# Patient Record
Sex: FEMALE | Race: WHITE | NOT HISPANIC OR LATINO | Employment: FULL TIME | URBAN - METROPOLITAN AREA
[De-identification: names, ages, dates, MRNs, and addresses within clinical notes are randomized per-mention and may not be internally consistent; named-entity substitution may affect disease eponyms.]

---

## 2017-02-27 ENCOUNTER — ALLSCRIPTS OFFICE VISIT (OUTPATIENT)
Dept: OTHER | Facility: OTHER | Age: 47
End: 2017-02-27

## 2017-06-05 ENCOUNTER — ALLSCRIPTS OFFICE VISIT (OUTPATIENT)
Dept: OTHER | Facility: OTHER | Age: 47
End: 2017-06-05

## 2017-06-05 ENCOUNTER — LAB REQUISITION (OUTPATIENT)
Dept: LAB | Facility: HOSPITAL | Age: 47
End: 2017-06-05
Payer: COMMERCIAL

## 2017-06-05 ENCOUNTER — TRANSCRIBE ORDERS (OUTPATIENT)
Dept: ADMINISTRATIVE | Facility: HOSPITAL | Age: 47
End: 2017-06-05

## 2017-06-05 ENCOUNTER — HOSPITAL ENCOUNTER (OUTPATIENT)
Dept: RADIOLOGY | Facility: MEDICAL CENTER | Age: 47
Discharge: HOME | End: 2017-06-05
Payer: COMMERCIAL

## 2017-06-05 DIAGNOSIS — M54.6 PAIN IN THORACIC SPINE: ICD-10-CM

## 2017-06-05 DIAGNOSIS — R42 DIZZINESS AND GIDDINESS: ICD-10-CM

## 2017-06-05 DIAGNOSIS — M54.50 LOW BACK PAIN: ICD-10-CM

## 2017-06-05 DIAGNOSIS — N92.0 EXCESSIVE AND FREQUENT MENSTRUATION WITH REGULAR CYCLE: ICD-10-CM

## 2017-06-05 DIAGNOSIS — R23.2 FLUSHING: ICD-10-CM

## 2017-06-05 DIAGNOSIS — R10.9 ABDOMINAL PAIN: ICD-10-CM

## 2017-06-05 LAB
ALBUMIN SERPL BCP-MCNC: 3.6 G/DL (ref 3.5–5)
ALP SERPL-CCNC: 62 U/L (ref 46–116)
ALT SERPL W P-5'-P-CCNC: 24 U/L (ref 12–78)
ANION GAP SERPL CALCULATED.3IONS-SCNC: 8 MMOL/L (ref 4–13)
AST SERPL W P-5'-P-CCNC: 18 U/L (ref 5–45)
BASOPHILS # BLD AUTO: 0.03 THOUSANDS/ΜL (ref 0–0.1)
BASOPHILS NFR BLD AUTO: 0 % (ref 0–1)
BILIRUB SERPL-MCNC: 0.51 MG/DL (ref 0.2–1)
BILIRUB UR QL STRIP: NEGATIVE
BUN SERPL-MCNC: 15 MG/DL (ref 5–25)
CALCIUM SERPL-MCNC: 10.1 MG/DL (ref 8.3–10.1)
CHLORIDE SERPL-SCNC: 103 MMOL/L (ref 100–108)
CLARITY UR: CLEAR
CO2 SERPL-SCNC: 29 MMOL/L (ref 21–32)
COLOR UR: YELLOW
CREAT SERPL-MCNC: 0.97 MG/DL (ref 0.6–1.3)
EOSINOPHIL # BLD AUTO: 0.2 THOUSAND/ΜL (ref 0–0.61)
EOSINOPHIL NFR BLD AUTO: 2 % (ref 0–6)
ERYTHROCYTE [DISTWIDTH] IN BLOOD BY AUTOMATED COUNT: 14 % (ref 11.6–15.1)
GFR SERPL CREATININE-BSD FRML MDRD: >60 ML/MIN/1.73SQ M
GLUCOSE P FAST SERPL-MCNC: 90 MG/DL (ref 65–99)
GLUCOSE UR STRIP-MCNC: NEGATIVE MG/DL
HCT VFR BLD AUTO: 42 % (ref 34.8–46.1)
HGB BLD-MCNC: 13.5 G/DL (ref 11.5–15.4)
HGB UR QL STRIP.AUTO: NEGATIVE
KETONES UR STRIP-MCNC: NEGATIVE MG/DL
LEUKOCYTE ESTERASE UR QL STRIP: NEGATIVE
LYMPHOCYTES # BLD AUTO: 2.64 THOUSANDS/ΜL (ref 0.6–4.47)
LYMPHOCYTES NFR BLD AUTO: 29 % (ref 14–44)
MCH RBC QN AUTO: 30.7 PG (ref 26.8–34.3)
MCHC RBC AUTO-ENTMCNC: 32.1 G/DL (ref 31.4–37.4)
MCV RBC AUTO: 96 FL (ref 82–98)
MONOCYTES # BLD AUTO: 0.83 THOUSAND/ΜL (ref 0.17–1.22)
MONOCYTES NFR BLD AUTO: 9 % (ref 4–12)
NEUTROPHILS # BLD AUTO: 5.39 THOUSANDS/ΜL (ref 1.85–7.62)
NEUTS SEG NFR BLD AUTO: 60 % (ref 43–75)
NITRITE UR QL STRIP: NEGATIVE
NRBC BLD AUTO-RTO: 0 /100 WBCS
PH UR STRIP.AUTO: 6.5 [PH] (ref 4.5–8)
PLATELET # BLD AUTO: 359 THOUSANDS/UL (ref 149–390)
PMV BLD AUTO: 10 FL (ref 8.9–12.7)
POTASSIUM SERPL-SCNC: 4.1 MMOL/L (ref 3.5–5.3)
PROT SERPL-MCNC: 7.4 G/DL (ref 6.4–8.2)
PROT UR STRIP-MCNC: NEGATIVE MG/DL
RBC # BLD AUTO: 4.4 MILLION/UL (ref 3.81–5.12)
SODIUM SERPL-SCNC: 140 MMOL/L (ref 136–145)
SP GR UR STRIP.AUTO: 1.01 (ref 1–1.03)
T4 FREE SERPL-MCNC: 1.08 NG/DL (ref 0.76–1.46)
TSH SERPL DL<=0.05 MIU/L-ACNC: 2.14 UIU/ML (ref 0.36–3.74)
UROBILINOGEN UR QL STRIP.AUTO: 0.2 E.U./DL
WBC # BLD AUTO: 9.11 THOUSAND/UL (ref 4.31–10.16)

## 2017-06-05 PROCEDURE — 84439 ASSAY OF FREE THYROXINE: CPT | Performed by: PHYSICIAN ASSISTANT

## 2017-06-05 PROCEDURE — 72080 X-RAY EXAM THORACOLMB 2/> VW: CPT

## 2017-06-05 PROCEDURE — 85025 COMPLETE CBC W/AUTO DIFF WBC: CPT | Performed by: PHYSICIAN ASSISTANT

## 2017-06-05 PROCEDURE — 80053 COMPREHEN METABOLIC PANEL: CPT | Performed by: PHYSICIAN ASSISTANT

## 2017-06-05 PROCEDURE — 81003 URINALYSIS AUTO W/O SCOPE: CPT | Performed by: PHYSICIAN ASSISTANT

## 2017-06-05 PROCEDURE — 84443 ASSAY THYROID STIM HORMONE: CPT | Performed by: PHYSICIAN ASSISTANT

## 2017-06-09 ENCOUNTER — GENERIC CONVERSION - ENCOUNTER (OUTPATIENT)
Dept: OTHER | Facility: OTHER | Age: 47
End: 2017-06-09

## 2017-09-25 ENCOUNTER — GENERIC CONVERSION - ENCOUNTER (OUTPATIENT)
Dept: OTHER | Facility: OTHER | Age: 47
End: 2017-09-25

## 2017-09-25 DIAGNOSIS — N85.2 HYPERTROPHY OF UTERUS: ICD-10-CM

## 2017-09-25 DIAGNOSIS — Z12.31 ENCOUNTER FOR SCREENING MAMMOGRAM FOR MALIGNANT NEOPLASM OF BREAST: ICD-10-CM

## 2017-09-29 ENCOUNTER — HOSPITAL ENCOUNTER (OUTPATIENT)
Dept: ULTRASOUND IMAGING | Facility: MEDICAL CENTER | Age: 47
Discharge: HOME | End: 2017-09-29
Payer: COMMERCIAL

## 2017-09-29 DIAGNOSIS — N85.2 HYPERTROPHY OF UTERUS: ICD-10-CM

## 2017-09-29 PROCEDURE — 76830 TRANSVAGINAL US NON-OB: CPT

## 2017-09-29 PROCEDURE — 76856 US EXAM PELVIC COMPLETE: CPT

## 2017-10-03 ENCOUNTER — GENERIC CONVERSION - ENCOUNTER (OUTPATIENT)
Dept: OTHER | Facility: OTHER | Age: 47
End: 2017-10-03

## 2017-10-17 ENCOUNTER — HOSPITAL ENCOUNTER (OUTPATIENT)
Dept: MAMMOGRAPHY | Facility: MEDICAL CENTER | Age: 47
Discharge: HOME | End: 2017-10-17
Payer: COMMERCIAL

## 2017-10-17 DIAGNOSIS — Z12.31 ENCOUNTER FOR SCREENING MAMMOGRAM FOR MALIGNANT NEOPLASM OF BREAST: ICD-10-CM

## 2017-10-17 PROCEDURE — G0202 SCR MAMMO BI INCL CAD: HCPCS

## 2017-10-17 PROCEDURE — 77063 BREAST TOMOSYNTHESIS BI: CPT

## 2017-11-03 ENCOUNTER — ALLSCRIPTS OFFICE VISIT (OUTPATIENT)
Dept: OTHER | Facility: OTHER | Age: 47
End: 2017-11-03

## 2017-11-03 DIAGNOSIS — M54.50 LOW BACK PAIN: ICD-10-CM

## 2017-11-04 NOTE — PROGRESS NOTES
Assessment  1  Lumbar pain with radiation down right leg (724 2) (M54 5)    Plan  Lumbar pain with radiation down right leg    · Cyclobenzaprine HCl - 10 MG Oral Tablet; TAKE 1 TABLET Bedtime   · Meloxicam 15 MG Oral Tablet; Take 1 tablet daily   · *1 - [facility]  *  Status: Active  Requested  for: [id number]  Care Summary provided  : Yes    Discussion/Summary    Patient is a 41year old female with right side back pain radiating into buttock and right leg  She also relates some numbness  This has been going on since June  he did have an xray which showed degenerative changes in her thoracic spine, but lumbar spin is fine  I prescribed an anti-inflammatory and muscle relaxer  I also recommended she go to physical therapy and she was given an RX  I will see her back in three weeks  Possible side effects of new medications were reviewed with the patient/guardian today  The treatment plan was reviewed with the patient/guardian  The patient/guardian understands and agrees with the treatment plan      Chief Complaint  pt complains of lower back pain with her feet tingling for the past week and a half  History of Present Illness  HPI: Patient with right sided back pain radiating down into buttock and right leg  Has numbness  Difficult to stand any length of time  Sits at work and every hour gets up to move around  Review of Systems    Constitutional: No fever, no chills, feels well, no tiredness, no recent weight gain or loss  Cardiovascular: no complaints of slow or fast heart rate, no chest pain, no palpitations, no leg claudication or lower extremity edema  Respiratory: no complaints of shortness of breath, no wheezing, no dyspnea on exertion, no orthopnea or PND  Gastrointestinal: no complaints of abdominal pain, no constipation, no nausea or diarrhea, no vomiting, no bloody stools  Musculoskeletal: limb pain, but-- as noted in HPI     Neurological: numbness, but-- as noted in HPI  Active Problems  1  Acid reflux disease (530 81) (K21 9)   2  Chronic right-sided thoracic back pain (724 1,338 29) (M54 6,G89 29)   3  Enlarged uterus (621 2) (N85 2)   4  Heartburn (787 1) (R12)   5  Heavy periods (626 2) (N92 0)   6  Hot flashes (782 62) (R23 2)   7  Lumbar pain with radiation down right leg (724 2) (M54 5)    Past Medical History  1  History of Candidiasis (112 9) (B37 9)   2  History of Colon cancer screening (V76 51) (Z12 11)   3  History of Encounter for gynecological examination (V72 31) (Z01 419)   4  History of Encounter for routine gynecological examination (V72 31) (Z01 419)   5  History of Encounter for routine laboratory testing (V72 62) (Z00 00)   6  History of Encounter for screening mammogram for malignant neoplasm of breast   (V76 12) (Z12 31)   7  History of Encounter for screening mammogram for malignant neoplasm of breast   (V76 12) (Z12 31)   8  History of acute bacterial sinusitis (V12 69) (Z87 09)   9  History of acute otitis externa (V12 49) (Z86 69)   10  History of acute pharyngitis (V12 69) (Z87 09)   11  History of acute sinusitis (V12 69) (Z87 09)   12  History of Sore throat (462) (J02 9)   13  History of Sore throat (462) (J02 9)   14  History of Verruca (078 10) (B07 9)  Active Problems And Past Medical History Reviewed: The active problems and past medical history were reviewed and updated today  Family History  Mother    1  Family history of Malignant neoplasm of breast, unspecified laterality  Maternal Grandmother    2  Family history of Ovarian cancer (183 0) (C56 9)  Paternal Grandmother    3  Family history of Malignant neoplasm of lung, unspecified laterality  Maternal Grandfather    4  Family history of colon cancer (V16 0) (Z80 0)  Maternal Aunt    5   Family history of Malignant neoplasm of breast, unspecified laterality    Social History   · Being A Social Drinker   · Never A Smoker  The social history was reviewed and updated today  The social history was reviewed and is unchanged  Current Meds   1  No Reported Medications Recorded    The medication list was reviewed and updated today  Allergies  1  No Known Drug Allergies    Vitals   Recorded: [id number] 04:18PM   Temperature 99 5 F, Tympanic   Heart Rate 85   Pulse Quality Normal   Respiration Quality Normal   Respiration 15   Systolic 127, RUE, Sitting   Diastolic 80, RUE, Sitting   Height 5 ft 4 in   Weight 174 lb 7 oz   BMI Calculated 29 94   BSA Calculated 1 85   O2 Saturation 99   LMP [id number]   Pain Scale 6     Physical Exam    Constitutional   General appearance: No acute distress, well appearing and well nourished  Musculoskeletal   Gait and station: Normal     Inspection/palpation of joints, bones, and muscles: Abnormal  -- Straight leg raising positive on right side  Neurologic   Reflexes: 2+ and symmetric  Future Appointments    Date/Time Provider Specialty Site   09/26/2018 09:00 AM JOSE L Durán   Obstetrics/[facility]necology [facility] [address]     Signatures   Electronically signed by : Analisa Jim DO; Nov  3 2017  4:44PM EST                       (Author)

## 2017-11-08 ENCOUNTER — APPOINTMENT (OUTPATIENT)
Dept: PHYSICAL THERAPY | Facility: CLINIC | Age: 47
End: 2017-11-08
Payer: COMMERCIAL

## 2017-11-08 DIAGNOSIS — M54.50 LOW BACK PAIN: ICD-10-CM

## 2017-11-08 PROCEDURE — G8990 OTHER PT/OT CURRENT STATUS: HCPCS

## 2017-11-08 PROCEDURE — G8991 OTHER PT/OT GOAL STATUS: HCPCS

## 2017-11-08 PROCEDURE — 97162 PT EVAL MOD COMPLEX 30 MIN: CPT

## 2017-11-08 PROCEDURE — 97110 THERAPEUTIC EXERCISES: CPT

## 2017-11-09 ENCOUNTER — GENERIC CONVERSION - ENCOUNTER (OUTPATIENT)
Dept: OTHER | Facility: OTHER | Age: 47
End: 2017-11-09

## 2017-11-09 ENCOUNTER — APPOINTMENT (OUTPATIENT)
Dept: PHYSICAL THERAPY | Facility: CLINIC | Age: 47
End: 2017-11-09
Payer: COMMERCIAL

## 2017-11-09 PROCEDURE — 97140 MANUAL THERAPY 1/> REGIONS: CPT

## 2017-11-09 PROCEDURE — 97110 THERAPEUTIC EXERCISES: CPT

## 2017-11-14 ENCOUNTER — APPOINTMENT (OUTPATIENT)
Dept: PHYSICAL THERAPY | Facility: CLINIC | Age: 47
End: 2017-11-14
Payer: COMMERCIAL

## 2017-11-14 PROCEDURE — 97140 MANUAL THERAPY 1/> REGIONS: CPT

## 2017-11-14 PROCEDURE — 97110 THERAPEUTIC EXERCISES: CPT

## 2017-11-16 ENCOUNTER — APPOINTMENT (OUTPATIENT)
Dept: PHYSICAL THERAPY | Facility: CLINIC | Age: 47
End: 2017-11-16
Payer: COMMERCIAL

## 2017-11-16 PROCEDURE — 97140 MANUAL THERAPY 1/> REGIONS: CPT

## 2017-11-16 PROCEDURE — 97035 APP MDLTY 1+ULTRASOUND EA 15: CPT

## 2017-11-20 ENCOUNTER — APPOINTMENT (OUTPATIENT)
Dept: PHYSICAL THERAPY | Facility: CLINIC | Age: 47
End: 2017-11-20
Payer: COMMERCIAL

## 2017-11-21 ENCOUNTER — APPOINTMENT (OUTPATIENT)
Dept: PHYSICAL THERAPY | Facility: CLINIC | Age: 47
End: 2017-11-21
Payer: COMMERCIAL

## 2017-11-21 PROCEDURE — 97140 MANUAL THERAPY 1/> REGIONS: CPT

## 2017-11-21 PROCEDURE — 97035 APP MDLTY 1+ULTRASOUND EA 15: CPT

## 2017-11-24 ENCOUNTER — APPOINTMENT (OUTPATIENT)
Dept: PHYSICAL THERAPY | Facility: CLINIC | Age: 47
End: 2017-11-24
Payer: COMMERCIAL

## 2017-11-27 ENCOUNTER — APPOINTMENT (OUTPATIENT)
Dept: PHYSICAL THERAPY | Facility: CLINIC | Age: 47
End: 2017-11-27
Payer: COMMERCIAL

## 2017-11-27 PROCEDURE — 97140 MANUAL THERAPY 1/> REGIONS: CPT

## 2017-11-27 PROCEDURE — 97035 APP MDLTY 1+ULTRASOUND EA 15: CPT

## 2017-11-29 ENCOUNTER — GENERIC CONVERSION - ENCOUNTER (OUTPATIENT)
Dept: OTHER | Facility: OTHER | Age: 47
End: 2017-11-29

## 2017-11-30 ENCOUNTER — APPOINTMENT (OUTPATIENT)
Dept: PHYSICAL THERAPY | Facility: CLINIC | Age: 47
End: 2017-11-30
Payer: COMMERCIAL

## 2017-11-30 PROCEDURE — 97035 APP MDLTY 1+ULTRASOUND EA 15: CPT

## 2017-11-30 PROCEDURE — 97140 MANUAL THERAPY 1/> REGIONS: CPT

## 2017-12-04 ENCOUNTER — APPOINTMENT (OUTPATIENT)
Dept: PHYSICAL THERAPY | Facility: CLINIC | Age: 47
End: 2017-12-04
Payer: COMMERCIAL

## 2017-12-07 ENCOUNTER — APPOINTMENT (OUTPATIENT)
Dept: PHYSICAL THERAPY | Facility: CLINIC | Age: 47
End: 2017-12-07
Payer: COMMERCIAL

## 2017-12-07 PROCEDURE — 97110 THERAPEUTIC EXERCISES: CPT

## 2017-12-07 PROCEDURE — 97140 MANUAL THERAPY 1/> REGIONS: CPT

## 2017-12-11 ENCOUNTER — APPOINTMENT (OUTPATIENT)
Dept: PHYSICAL THERAPY | Facility: CLINIC | Age: 47
End: 2017-12-11
Payer: COMMERCIAL

## 2017-12-11 PROCEDURE — 97035 APP MDLTY 1+ULTRASOUND EA 15: CPT

## 2017-12-11 PROCEDURE — G8990 OTHER PT/OT CURRENT STATUS: HCPCS

## 2017-12-11 PROCEDURE — G8991 OTHER PT/OT GOAL STATUS: HCPCS

## 2017-12-11 PROCEDURE — 97140 MANUAL THERAPY 1/> REGIONS: CPT

## 2017-12-13 ENCOUNTER — GENERIC CONVERSION - ENCOUNTER (OUTPATIENT)
Dept: FAMILY MEDICINE CLINIC | Facility: CLINIC | Age: 47
End: 2017-12-13

## 2017-12-14 ENCOUNTER — APPOINTMENT (OUTPATIENT)
Dept: PHYSICAL THERAPY | Facility: CLINIC | Age: 47
End: 2017-12-14
Payer: COMMERCIAL

## 2018-01-09 NOTE — RESULT NOTES
Verified Results  * [facility]TE [facility] [id number] 12:47PM Katey Goncalves Order Number: [id number]    - Patient Instructions: To schedule this appointment, please contact Central Scheduling at [phone]  Test Name Result Flag Reference   US PELVIS COMPLETE (TRANSABDOMINAL AND TRANSVAGINAL) (Report)     PELVIC ULTRASOUND, COMPLETE     INDICATION: Possible pelvic mass on examination  Patient is asymptomatic  LMP 8/18/2017      COMPARISON: None  TECHNIQUE:  Transabdominal pelvic ultrasound was performed in sagittal and transverse planes with a curvilinear transducer  Additional transvaginal imaging was performed to better evaluate the endometrium and ovaries  Imaging included volumetric    sweeps as well as traditional still imaging technique  FINDINGS:     UTERUS:   The uterus is retroverted in position, measuring 11 0 x 7 3 x 3 7 cm  Contour and echotexture appear normal  Large hypoechoic fibroid posteriorly measuring 10 1 x 7 5 x 7 1 cm  The cervix shows no suspicious abnormality  ENDOMETRIUM:    Normal caliber of 5 mm  Homogenous and normal in appearance  OVARIES/ADNEXA:   Right ovary: 2 9 x 3 1 x 2 3 cm  No suspicious right ovarian abnormality  Doppler flow within normal limits  Left ovary: 2 5 x 1 4 x 2 2 cm  No suspicious left ovarian abnormality  Doppler flow within normal limits  No suspicious adnexal mass or loculated collections  There is no free fluid  IMPRESSION:      Retroverted uterus with large posterior fibroid measuring 10 1 x 7 5 x 7 1 cm  Workstation performed: [id number]     Signed by:    Juanita Little MD   10/2/17

## 2018-01-13 VITALS
SYSTOLIC BLOOD PRESSURE: 120 MMHG | HEIGHT: 64 IN | TEMPERATURE: 98.3 F | BODY MASS INDEX: 29.71 KG/M2 | OXYGEN SATURATION: 99 % | WEIGHT: 174 LBS | DIASTOLIC BLOOD PRESSURE: 84 MMHG | HEART RATE: 74 BPM

## 2018-01-13 VITALS
SYSTOLIC BLOOD PRESSURE: 118 MMHG | BODY MASS INDEX: 29.23 KG/M2 | DIASTOLIC BLOOD PRESSURE: 70 MMHG | HEART RATE: 75 BPM | HEIGHT: 64 IN | WEIGHT: 171.19 LBS | TEMPERATURE: 99.3 F | OXYGEN SATURATION: 96 % | RESPIRATION RATE: 16 BRPM

## 2018-01-14 VITALS
WEIGHT: 174.44 LBS | BODY MASS INDEX: 29.78 KG/M2 | TEMPERATURE: 99.5 F | HEIGHT: 64 IN | RESPIRATION RATE: 15 BRPM | SYSTOLIC BLOOD PRESSURE: 102 MMHG | DIASTOLIC BLOOD PRESSURE: 80 MMHG | OXYGEN SATURATION: 99 % | HEART RATE: 85 BPM

## 2018-01-14 NOTE — RESULT NOTES
Verified Results  XR SPINE THORACOLUMBAR 2 VIEW [id number] 04:22PM Lila Lee    Order Number: [id number]     Test Name Result Flag Reference   XR SPINE THORACOLUMBAR 2 VW (Report)     THORACOLUMBAR SPINE     INDICATION: Right lower thoracic and right upper to mid lumbar spine pain     COMPARISON: None     VIEWS: AP and lateral     IMAGES: 3     FINDINGS:     The visualized vertebrae demonstrate normal stature and alignment  There is mild spondylosis in the lower thoracic spine with small anterior osteophytes from T7 through T12  There is no significant disc space narrowing  Lumbar spine is unremarkable  There is no fracture or pathologic bone lesion  The intervertebral disc spaces are maintained  The pedicles are intact  IMPRESSION:     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  Unremarkable lumbar spine  Workstation performed: [id number]     Signed by:   Deon Donahue MD   6/7/17     (1) CBC/PLT/DIFF [id number] 03:56PM Rondi Cranker Order Number: [id number]     Test Name Result Flag Reference   WBC COUNT 9 11 Thousand/uL  4 31-10 16   RBC COUNT 4 40 Million/uL  3 81-5 12   HEMOGLOBIN 13 5 g/dL  11 5-15 4   HEMATOCRIT 42 0 %  34 8-46  1   MCV 96 fL  82-98   MCH 30 7 pg  26 8-34 3   MCHC 32 1 g/dL  31 4-37 4   RDW 14 0 %  11 6-15 1   MPV 10 0 fL  8 9-12 7   PLATELET COUNT 063 Thousands/uL  149-390   nRBC AUTOMATED 0 /100 WBCs     NEUTROPHILS RELATIVE PERCENT 60 %  43-75   LYMPHOCYTES RELATIVE PERCENT 29 %  14-44   MONOCYTES RELATIVE PERCENT 9 %  4-12   EOSINOPHILS RELATIVE PERCENT 2 %  0-6   BASOPHILS RELATIVE PERCENT 0 %  0-1   NEUTROPHILS ABSOLUTE COUNT 5 39 Thousands/? ??L  1 85-7 62   LYMPHOCYTES ABSOLUTE COUNT 2 64 Thousands/? ??L  0 60-4 47   MONOCYTES ABSOLUTE COUNT 0 83 Thousand/? ??L  0 17-1 22   EOSINOPHILS ABSOLUTE COUNT 0 20 Thousand/? ??L  0 00-0 61   BASOPHILS ABSOLUTE COUNT 0 03 Thousands/? ??L  0 00-0 10     (1) COMPREHENSIVE METABOLIC PANEL [id number] 94:65TP Diana Ana    Order Number: [id number]     Test Name Result Flag Reference   SODIUM 140 mmol/L  136-145   POTASSIUM 4 1 mmol/L  3 5-5 3   CHLORIDE 103 mmol/L  100-108   CARBON DIOXIDE 29 mmol/L  21-32   ANION GAP (CALC) 8 mmol/L  4-13   BLOOD UREA NITROGEN 15 mg/dL  5-25   CREATININE 0 97 mg/dL  0 60-1 30   Standardized to IDMS reference method   CALCIUM 10 1 mg/dL  8 3-10 1   BILI, TOTAL 0 51 mg/dL  0 20-1 00   ALK PHOSPHATAS 62 U/L     ALT (SGPT) 24 U/L  12-78   AST(SGOT) 18 U/L  5-45   ALBUMIN 3 6 g/dL  3 5-5 0   TOTAL PROTEIN 7 4 g/dL  6 4-8 2   eGFR Non-African American      >60 0 ml/min/1 73sq m   [facility] Disease Education Program recommendations are as follows:  GFR calculation is accurate only with a steady state creatinine  Chronic Kidney disease less than 60 ml/min/1 73 sq  meters  Kidney failure less than 15 ml/min/1 73 sq  meters  GLUCOSE FASTING 90 mg/dL  65-99     (1) TSH [id number] 03:56PM BVG Indiaer    Order Number: [id number]     Test Name Result Flag Reference   TSH 2 140 uIU/mL  0 358-3 740   Patients undergoing fluorescein dye angiography may retain small amounts of fluorescein in the body for 48-72 hours post procedure  Samples containing fluorescein can produce falsely depressed TSH values  If the patient had this procedure,a specimen should be resubmitted post fluorescein clearance            The recommended reference ranges for TSH during pregnancy are as follows:  First trimester 0 1 to 2 5 uIU/mL  Second trimester  0 2 to 3 0 uIU/mL  Third trimester 0 3 to 3 0 uIU/m     (1) T4, FREE 05Jun2017 03:56PM [employer]    Order Number: [id number]     Test Name Result Flag Reference   T4,FREE 1 08 ng/dL  0 76-1 46     (1) URINALYSIS w URINE C/S REFLEX (will reflex a microscopy if leukocytes, occult blood, or nitrites are not within normal limits) [id number] 03:56PM Jerlean Dandy Order Number: [id number]     Test Name Result Flag Reference   COLOR Yellow     CLARITY Clear     PH UA 6 5  4 5-8 0   LEUKOCYTE ESTERASE UA Negative  Negative   NITRITE UA Negative  Negative   PROTEIN UA Negative mg/dl  Negative   GLUCOSE UA Negative mg/dl  Negative   KETONES UA Negative mg/dl  Negative   UROBILINOGEN UA 0 2 E U /dl  0 2, 1 0 E U /dl   BILIRUBIN UA Negative  Negative   BLOOD UA Negative  Negative   SPECIFIC GRAVITY UA 1 008  1 003-1 030

## 2018-01-22 VITALS
HEIGHT: 64 IN | BODY MASS INDEX: 29.73 KG/M2 | WEIGHT: 174.13 LBS | DIASTOLIC BLOOD PRESSURE: 68 MMHG | SYSTOLIC BLOOD PRESSURE: 92 MMHG

## 2018-01-22 VITALS
DIASTOLIC BLOOD PRESSURE: 76 MMHG | OXYGEN SATURATION: 96 % | RESPIRATION RATE: 16 BRPM | BODY MASS INDEX: 29.62 KG/M2 | HEART RATE: 68 BPM | SYSTOLIC BLOOD PRESSURE: 132 MMHG | HEIGHT: 64 IN | WEIGHT: 173.5 LBS | TEMPERATURE: 98.1 F

## 2018-05-08 ENCOUNTER — OFFICE VISIT (OUTPATIENT)
Dept: FAMILY MEDICINE CLINIC | Facility: CLINIC | Age: 48
End: 2018-05-08
Payer: COMMERCIAL

## 2018-05-08 VITALS
HEART RATE: 65 BPM | DIASTOLIC BLOOD PRESSURE: 72 MMHG | OXYGEN SATURATION: 97 % | HEIGHT: 64 IN | WEIGHT: 181.1 LBS | BODY MASS INDEX: 30.92 KG/M2 | RESPIRATION RATE: 15 BRPM | TEMPERATURE: 98 F | SYSTOLIC BLOOD PRESSURE: 112 MMHG

## 2018-05-08 DIAGNOSIS — R68.89 SENSATION OF SWOLLEN THROAT: ICD-10-CM

## 2018-05-08 DIAGNOSIS — R63.1 INCREASED THIRST: ICD-10-CM

## 2018-05-08 DIAGNOSIS — R53.83 FATIGUE, UNSPECIFIED TYPE: Primary | ICD-10-CM

## 2018-05-08 DIAGNOSIS — R60.9 SWELLING: ICD-10-CM

## 2018-05-08 PROBLEM — M54.6 CHRONIC RIGHT-SIDED THORACIC BACK PAIN: Status: ACTIVE | Noted: 2017-06-05

## 2018-05-08 PROBLEM — R23.2 HOT FLASHES: Status: ACTIVE | Noted: 2017-06-05

## 2018-05-08 PROBLEM — G89.29 CHRONIC RIGHT-SIDED THORACIC BACK PAIN: Status: ACTIVE | Noted: 2017-06-05

## 2018-05-08 PROBLEM — K21.9 ACID REFLUX DISEASE: Status: ACTIVE | Noted: 2017-02-27

## 2018-05-08 PROBLEM — N85.2 ENLARGED UTERUS: Status: ACTIVE | Noted: 2017-09-25

## 2018-05-08 PROCEDURE — 99214 OFFICE O/P EST MOD 30 MIN: CPT | Performed by: PHYSICIAN ASSISTANT

## 2018-05-08 NOTE — PROGRESS NOTES
Assessment/Plan:      Diagnoses and all orders for this visit:    Fatigue, unspecified type  -     CBC and differential; Future  -     Comprehensive metabolic panel; Future  -     Hemoglobin A1C W/Refl To Glycomark(R); Future  -     T4, free; Future  -     Sedimentation rate, automated; Future  -     Lyme Antibody Profile with reflex to WB; Future  -     CBC and differential  -     Comprehensive metabolic panel  -     Hemoglobin A1C W/Refl To Glycomark(R)  -     T4, free  -     Sedimentation rate, automated  -     Lyme Antibody Profile with reflex to WB  -     TSH, 3rd generation; Future  -     TSH, 3rd generation    Increased thirst  -     CBC and differential; Future  -     Comprehensive metabolic panel; Future  -     Hemoglobin A1C W/Refl To Glycomark(R); Future  -     T4, free; Future  -     Sedimentation rate, automated; Future  -     Lyme Antibody Profile with reflex to WB; Future  -     CBC and differential  -     Comprehensive metabolic panel  -     Hemoglobin A1C W/Refl To Glycomark(R)  -     T4, free  -     Sedimentation rate, automated  -     Lyme Antibody Profile with reflex to WB  -     TSH, 3rd generation; Future  -     TSH, 3rd generation    Swelling  -     CBC and differential; Future  -     Comprehensive metabolic panel; Future  -     Hemoglobin A1C W/Refl To Glycomark(R); Future  -     T4, free; Future  -     Sedimentation rate, automated; Future  -     Lyme Antibody Profile with reflex to WB; Future  -     CBC and differential  -     Comprehensive metabolic panel  -     Hemoglobin A1C W/Refl To Glycomark(R)  -     T4, free  -     Sedimentation rate, automated  -     Lyme Antibody Profile with reflex to WB  -     TSH, 3rd generation; Future  -     TSH, 3rd generation    Sensation of swollen throat  -     CBC and differential; Future  -     Comprehensive metabolic panel; Future  -     Hemoglobin A1C W/Refl To Glycomark(R); Future  -     T4, free;  Future  -     Sedimentation rate, automated; Future  -     Lyme Antibody Profile with reflex to WB; Future  -     CBC and differential  -     Comprehensive metabolic panel  -     Hemoglobin A1C W/Refl To Glycomark(R)  -     T4, free  -     Sedimentation rate, automated  -     Lyme Antibody Profile with reflex to WB  -     TSH, 3rd generation; Future  -     TSH, 3rd generation       51-year-old female here today with no significant past medical history aside from esophageal reflux presenting today for multiple symptoms of concern, most noted yesterday or earlier today, chronic fatigue noted for a while, she states  No obvious exam abnormalities and her vitals are normal   Unclear etiology  I will have patient start workup with extensive blood work including CBC and CMP, view calcium to rule out endocrine issue as well as TSH and free T4  Sed rate and Lyme testing also recommended as well as an A1c to rule out diabetes  I am uncertain as to what would be causing her throat swelling, she has no palpable discrete mass, lymphadenopathy or enlargement of her thyroid  She does question allergy so I will have her start a 24 hr Claritin  Her throat examination does not reveal any swelling, lesions or enlarged tonsils  I advised limiting alcohol  As well as caffeine and sodium intake  She drinks in moderation but states she had about 6 drinks over the weekend as well as a shot of patron so I am uncertain if this could be secondary to the alcohol  We will call or follow up with her once blood work results are in and determine if further evaluation or treatment is necessary  She is to monitor symptoms closely and should call at any time should symptoms worsen or new concerning symptoms arise  She is stable from a cardiovascular and respiratory standpoint  Chief Complaint   Patient presents with    Edema     Today pt noticed swelling in her fingers, toes and face  OTC advil with litte releif    Insomnia     X1 month, wakes exhausted   OTC med, pt cant verena feels it does just opposite  Subjective:     Patient ID: Tara Rodriguez is a 41 y o  female     48y/o female here today for multiple sxs  States for a while she has felt very exhausted even though sleeping well  She feels very thirsty even though she drinks a lot of water  She feels swollen all over noted today  Does not feel feverish, no muscle or joint pain  No rashes  No new medications  No CP or SOB  She feels slightly lightheaded today  No stomach pain, no changes to bowels  Throat feels like it is tight but not sore  No trouble with swallowing  Cousin with thyroid  Review of Systems   Constitutional: Positive for fatigue  HENT: Negative  Respiratory: Negative  Cardiovascular: Negative  Gastrointestinal: Negative  Genitourinary: Negative  Neurological:        As in HPI   Psychiatric/Behavioral: Negative  The following portions of the patient's history were reviewed and updated as appropriate: allergies, current medications, past family history, past medical history, past social history, past surgical history and problem list       Objective:     Physical Exam   Constitutional: She is oriented to person, place, and time  She appears well-developed and well-nourished  overweight   HENT:   Mouth/Throat: Oropharynx is clear and moist    Neck: Neck supple  Normal carotid pulses present  Carotid bruit is not present  No thyroid mass and no thyromegaly present  Cardiovascular: Normal rate, regular rhythm, normal heart sounds and intact distal pulses  LE's with possible slight, trace swelling B/L at ankles  No asymmetry noted   Pulmonary/Chest: Effort normal and breath sounds normal    Abdominal: Normal appearance and bowel sounds are normal  There is no tenderness  Musculoskeletal:   Gross normal movement of spine and extremities   Lymphadenopathy:     She has no cervical adenopathy  Neurological: She is alert and oriented to person, place, and time   No cranial nerve deficit or sensory deficit  She exhibits normal muscle tone  Coordination and gait normal    Skin:   No obvious swelling or rashes   Psychiatric: She has a normal mood and affect  Vitals reviewed        Vitals:    05/08/18 1313   BP: 112/72   BP Location: Left arm   Patient Position: Sitting   Cuff Size: Adult   Pulse: 65   Resp: 15   Temp: 98 °F (36 7 °C)   TempSrc: Tympanic   SpO2: 97%   Weight: 82 1 kg (181 lb 1 6 oz)   Height: 5' 4" (1 626 m)

## 2018-05-10 LAB
ALBUMIN SERPL-MCNC: 3.5 G/DL (ref 3.6–5.1)
ALBUMIN/GLOB SERPL: 1.4 (CALC) (ref 1–2.5)
ALP SERPL-CCNC: 57 U/L (ref 33–115)
ALT SERPL-CCNC: 14 U/L (ref 6–29)
AST SERPL-CCNC: 16 U/L (ref 10–35)
B BURGDOR AB SER IA-ACNC: <0.9 INDEX
BASOPHILS # BLD AUTO: 32 CELLS/UL (ref 0–200)
BASOPHILS NFR BLD AUTO: 0.4 %
BILIRUB SERPL-MCNC: 0.7 MG/DL (ref 0.2–1.2)
BUN SERPL-MCNC: 11 MG/DL (ref 7–25)
BUN/CREAT SERPL: ABNORMAL (CALC) (ref 6–22)
CALCIUM SERPL-MCNC: 8.7 MG/DL (ref 8.6–10.2)
CHLORIDE SERPL-SCNC: 104 MMOL/L (ref 98–110)
CO2 SERPL-SCNC: 26 MMOL/L (ref 20–31)
CREAT SERPL-MCNC: 0.78 MG/DL (ref 0.5–1.1)
EOSINOPHIL # BLD AUTO: 232 CELLS/UL (ref 15–500)
EOSINOPHIL NFR BLD AUTO: 2.9 %
ERYTHROCYTE [DISTWIDTH] IN BLOOD BY AUTOMATED COUNT: 13.2 % (ref 11–15)
ERYTHROCYTE [SEDIMENTATION RATE] IN BLOOD BY WESTERGREN METHOD: 9 MM/H
GLOBULIN SER CALC-MCNC: 2.5 G/DL (CALC) (ref 1.9–3.7)
GLUCOSE SERPL-MCNC: 90 MG/DL (ref 65–99)
HBA1C MFR BLD: 5.4 % OF TOTAL HGB
HCT VFR BLD AUTO: 37.9 % (ref 35–45)
HGB BLD-MCNC: 12.6 G/DL (ref 11.7–15.5)
LYMPHOCYTES # BLD AUTO: 2264 CELLS/UL (ref 850–3900)
LYMPHOCYTES NFR BLD AUTO: 28.3 %
MCH RBC QN AUTO: 31.3 PG (ref 27–33)
MCHC RBC AUTO-ENTMCNC: 33.2 G/DL (ref 32–36)
MCV RBC AUTO: 94.3 FL (ref 80–100)
MONOCYTES # BLD AUTO: 752 CELLS/UL (ref 200–950)
MONOCYTES NFR BLD AUTO: 9.4 %
NEUTROPHILS # BLD AUTO: 4720 CELLS/UL (ref 1500–7800)
NEUTROPHILS NFR BLD AUTO: 59 %
PLATELET # BLD AUTO: 293 THOUSAND/UL (ref 140–400)
PMV BLD REES-ECKER: 9.8 FL (ref 7.5–12.5)
POTASSIUM SERPL-SCNC: 4 MMOL/L (ref 3.5–5.3)
PROT SERPL-MCNC: 6 G/DL (ref 6.1–8.1)
RBC # BLD AUTO: 4.02 MILLION/UL (ref 3.8–5.1)
SL AMB EGFR AFRICAN AMERICAN: 106 ML/MIN/1.73M2
SL AMB EGFR NON AFRICAN AMERICAN: 92 ML/MIN/1.73M2
SODIUM SERPL-SCNC: 136 MMOL/L (ref 135–146)
T4 FREE SERPL-MCNC: 1.3 NG/DL (ref 0.8–1.8)
TSH SERPL-ACNC: 4.27 MIU/L
WBC # BLD AUTO: 8 THOUSAND/UL (ref 3.8–10.8)

## 2018-06-28 ENCOUNTER — TELEPHONE (OUTPATIENT)
Dept: OBGYN CLINIC | Facility: MEDICAL CENTER | Age: 48
End: 2018-06-28

## 2018-09-26 ENCOUNTER — ANNUAL EXAM (OUTPATIENT)
Dept: OBGYN CLINIC | Facility: MEDICAL CENTER | Age: 48
End: 2018-09-26
Payer: COMMERCIAL

## 2018-09-26 VITALS
WEIGHT: 182.4 LBS | DIASTOLIC BLOOD PRESSURE: 78 MMHG | HEIGHT: 64 IN | SYSTOLIC BLOOD PRESSURE: 110 MMHG | BODY MASS INDEX: 31.14 KG/M2

## 2018-09-26 DIAGNOSIS — D25.9 UTERINE LEIOMYOMA, UNSPECIFIED LOCATION: ICD-10-CM

## 2018-09-26 DIAGNOSIS — Z12.31 ENCOUNTER FOR SCREENING MAMMOGRAM FOR MALIGNANT NEOPLASM OF BREAST: ICD-10-CM

## 2018-09-26 DIAGNOSIS — R10.2 PELVIC PAIN: ICD-10-CM

## 2018-09-26 DIAGNOSIS — Z01.419 ENCOUNTER FOR GYNECOLOGICAL EXAMINATION: Primary | ICD-10-CM

## 2018-09-26 PROCEDURE — S0612 ANNUAL GYNECOLOGICAL EXAMINA: HCPCS | Performed by: OBSTETRICS & GYNECOLOGY

## 2018-09-26 NOTE — PROGRESS NOTES
ASSESSMENT & PLAN: Mel Tolentino is a 57 y o  Alex Toribio with normal gynecologic exam     1   Routine well woman exam done today  2  Pap and HPV:  The patient's last pap and hpv was   It was normal     Pap and cotesting was not done today  Current ASCCP Guidelines reviewed  3   Mammogram ordered  4  The following were reviewed in today's visit: breast self exam, mammography screening ordered, use and side effects of OCPs, exercise and healthy diet  5  Pelvic pain /fibroid uterus     CC:  Annual Gynecologic Examination    HPI: Mel Tolentino is a 57 y o  Alex Toribio who presents for annual gynecologic examination  She has the following concerns:  Pelvic pain and painful intercourse     Health Maintenance:    She wears her seatbelt routinely  She does perform regular monthly self breast exams  She feels safe at home  Patient Active Problem List   Diagnosis    Acid reflux disease    Chronic right-sided thoracic back pain    Enlarged uterus    Hot flashes       Past Medical History:   Diagnosis Date    Right flank pain     [id number] RESOLVED       Past Surgical History:   Procedure Laterality Date    VAGINAL DELIVERY         Past OB/Gyn History:  OB History      Para Term  AB Living    1 1 1     1    SAB TAB Ectopic Multiple Live Births            1           Pt has menstrual issues  - has noticed they have become closer together   History of sexually transmitted infection: No   History of abnormal pap smears: No      Patient is currently sexually active    heterosexual      Family History   Problem Relation Age of Onset    Breast cancer Mother     Ovarian cancer Maternal Grandmother     Colon cancer Maternal Grandfather     Lung cancer Paternal Grandmother     Breast cancer Maternal Aunt        Social History:  Social History     Social History    Marital status:      Spouse name: N/A    Number of children: N/A    Years of education: N/A     Occupational History  Not on file  Social History Main Topics    Smoking status: Never Smoker    Smokeless tobacco: Never Used    Alcohol use Yes      Comment: occa    Drug use: No    Sexual activity: Yes     Partners: Male     Birth control/ protection: None     Other Topics Concern    Not on file     Social History Narrative    ALWAYS USES SEAT BELT    DAILY CAFFEINE CONSUMPTION 1 SERVING A DAY    FEELS SAFE AT HOME       No Known Allergies  No current outpatient prescriptions on file  Review of Systems:    Review of Systems  Constitutional :no fever, feels well, no tiredness, no recent weight gain or loss  ENT: no ear ache, no loss of hearing, no nosebleeds or nasal discharge, no sore throat or hoarseness  Cardiovascular: no complaints of slow or fast heart beat, no chest pain, no palpitations, no leg claudication or lower extremity edema  Respiratory: no complaints of shortness of shortness of breath, no GIBSON  Breasts:no complaints of breast pain, breast lump, or nipple discharge  Gastrointestinal: no complaints of abdominal pain, constipation, nausea, vomiting, or diarrhea or bloody stools  Genitourinary :as noted in HPI  Musculoskeletal: no complaints of arthralgia, no myalgia, no joint swelling or stiffness, no limb pain or swelling  Integumentary: no complaints of skin rash or lesion, itching or dry skin  Neurological: no complaints of headache, no confusion, no numbness or tingling, no dizziness or fainting    Objective      /78   Ht 5' 4" (1 626 m)   Wt 82 7 kg (182 lb 6 4 oz)   LMP 09/16/2018 (Exact Date)   Breastfeeding?  No   BMI 31 31 kg/m²     General:   appears stated age, cooperative, alert normal mood and affect   Heart: regular rate and rhythm, S1, S2 normal, no murmur, click, rub or gallop   Lungs: clear to auscultation bilaterally   Breasts: normal appearance, no masses or tenderness, Normal to palpation without dominant masses   Abdomen: soft, non-tender, without masses or organomegaly Vulva: normal   Vagina: normal vagina, no discharge, exudate, lesion, or erythema   Urethra: normal   Cervix: Normal, no discharge  Nontender     Uterus: normal size, contour, position, consistency, mobility, non-tender   Adnexa: no mass, fullness, tenderness   Psychiatric orientation to person, place, and time: normal  mood and affect: normal

## 2018-09-28 ENCOUNTER — HOSPITAL ENCOUNTER (OUTPATIENT)
Dept: ULTRASOUND IMAGING | Facility: MEDICAL CENTER | Age: 48
Discharge: HOME | End: 2018-09-28
Payer: COMMERCIAL

## 2018-09-28 DIAGNOSIS — R10.2 PELVIC PAIN: ICD-10-CM

## 2018-09-28 DIAGNOSIS — D25.9 UTERINE LEIOMYOMA, UNSPECIFIED LOCATION: ICD-10-CM

## 2018-09-28 PROCEDURE — 76856 US EXAM PELVIC COMPLETE: CPT

## 2018-09-28 PROCEDURE — 76830 TRANSVAGINAL US NON-OB: CPT

## 2018-10-01 NOTE — PROGRESS NOTES
Please inform patient there has been growth of fibroid which certainly can be cause of pain , offer appointment if wants to discuss options of management

## 2018-10-09 ENCOUNTER — OFFICE VISIT (OUTPATIENT)
Dept: OBGYN CLINIC | Facility: MEDICAL CENTER | Age: 48
End: 2018-10-09
Payer: COMMERCIAL

## 2018-10-09 VITALS — BODY MASS INDEX: 31.46 KG/M2 | DIASTOLIC BLOOD PRESSURE: 78 MMHG | WEIGHT: 183.3 LBS | SYSTOLIC BLOOD PRESSURE: 110 MMHG

## 2018-10-09 DIAGNOSIS — D25.9 UTERINE LEIOMYOMA, UNSPECIFIED LOCATION: Primary | ICD-10-CM

## 2018-10-09 PROCEDURE — 99214 OFFICE O/P EST MOD 30 MIN: CPT | Performed by: OBSTETRICS & GYNECOLOGY

## 2018-10-09 NOTE — PROGRESS NOTES
Assessment BRICE was seen today for follow-up  Diagnoses and all orders for this visit:    Uterine leiomyoma, unspecified location         Plan: today we discussed US showing interval enlargement of fibroid uterus  Likely ause of her pain with intercourse and increased pelvic pain  We discussed possible treatment options : Lupron / myomectomy/ uterine artery embolization/ hysterectomy   States she would like to discuss options with  and call back  States leaning more towards hysterectomy , aware if this is what is decided she would need a pre op EMB     Subjective   Dakota Gifford is a 57 y o  female here for a a follow up visit from [address] sent for known fibroid/ increased pelvic pain and painful intercourse   Patient Active Problem List   Diagnosis    Acid reflux disease    Chronic right-sided thoracic back pain    Enlarged uterus    Hot flashes       Gynecologic History  Patient's last menstrual period was 09/16/2018 (exact date)  Past Medical History:   Diagnosis Date    Right flank pain     [id number] RESOLVED     Past Surgical History:   Procedure Laterality Date    VAGINAL DELIVERY       Family History   Problem Relation Age of Onset    Breast cancer Mother     Ovarian cancer Maternal Grandmother     Colon cancer Maternal Grandfather     Lung cancer Paternal Grandmother     Breast cancer Maternal Aunt      Social History     Social History    Marital status:      Spouse name: N/A    Number of children: N/A    Years of education: N/A     Occupational History    Not on file       Social History Main Topics    Smoking status: Never Smoker    Smokeless tobacco: Never Used    Alcohol use Yes      Comment: occa    Drug use: No    Sexual activity: Yes     Partners: Male     Birth control/ protection: None     Other Topics Concern    Not on file     Social History Narrative    ALWAYS USES SEAT BELT    DAILY CAFFEINE CONSUMPTION 1 SERVING A DAY    FEELS SAFE AT HOME     No Known Allergies  No current outpatient prescriptions on file  Review of Systems  Constitutional :no fever, feels well, no tiredness, no recent weight gain or loss  ENT: no ear ache, no loss of hearing, no nosebleeds or nasal discharge, no sore throat or hoarseness  Cardiovascular: no complaints of slow or fast heart beat, no chest pain, no palpitations, no leg claudication or lower extremity edema  Respiratory: no complaints of shortness of shortness of breath, no GIBSON  Breasts:no complaints of breast pain, breast lump, or nipple discharge  Gastrointestinal: no complaints of abdominal pain, constipation, nausea, vomiting, or diarrhea or bloody stools  Genitourinary : as noted in HPI  Musculoskeletal: no complaints of arthralgia, no myalgia, no joint swelling or stiffness, no limb pain or swelling  Integumentary: no complaints of skin rash or lesion, itching or dry skin  Neurological: no complaints of headache, no confusion, no numbness or tingling, no dizziness or fainting     Objective     /78   Wt 83 1 kg (183 lb 4 8 oz)   LMP 09/16/2018 (Exact Date)   Breastfeeding? No   BMI 31 46 kg/m²     General:   appears stated age, cooperative, alert normal mood and affect   Psychiatric orientation to person, place, and time: normal  mood and affect: normal   FINDINGS:     UTERUS:  The uterus is retroverted in position, measuring 11 7 x 10 3 x 8 cm  Heterogeneous hypoechoic posterior intramural leiomyoma approximately 9 3 x 7 8 x 10 4 cm and previously 7 5 x 5 9 x 9 1 cm  These measurements obtained on the transabdominal view and compared to the measurements on the transabdominal views on the prior   study  The cervix shows no suspicious abnormality      ENDOMETRIUM:    Normal caliber of 7 mm  Homogenous and normal in appearance      OVARIES/ADNEXA:  Right ovary:  2 3 x 1 5 x 2 3 cm  No suspicious right ovarian abnormality  Doppler flow within normal limits      Left ovary:  2 7 x 1 4 x 1 5 cm     No suspicious left ovarian abnormality  Doppler flow within normal limits      No suspicious adnexal mass or loculated collections    There is no free fluid      IMPRESSION:     Interval enlargement of large uterine posterior intramural fibroid measuring approximately 9 3 x 7 8 x 10 4 cm and previously 7 5 x 5 9 x 9 1 cm      Remainder of the examination is normal

## 2018-10-20 ENCOUNTER — HOSPITAL ENCOUNTER (OUTPATIENT)
Dept: MAMMOGRAPHY | Facility: MEDICAL CENTER | Age: 48
Discharge: HOME | End: 2018-10-20
Payer: COMMERCIAL

## 2018-10-20 DIAGNOSIS — Z12.31 ENCOUNTER FOR SCREENING MAMMOGRAM FOR MALIGNANT NEOPLASM OF BREAST: ICD-10-CM

## 2018-10-20 PROCEDURE — 77063 BREAST TOMOSYNTHESIS BI: CPT

## 2018-10-20 PROCEDURE — 77067 SCR MAMMO BI INCL CAD: CPT

## 2018-10-24 ENCOUNTER — PROCEDURE VISIT (OUTPATIENT)
Dept: OBGYN CLINIC | Facility: MEDICAL CENTER | Age: 48
End: 2018-10-24
Payer: COMMERCIAL

## 2018-10-24 VITALS — DIASTOLIC BLOOD PRESSURE: 78 MMHG | WEIGHT: 183.2 LBS | BODY MASS INDEX: 31.45 KG/M2 | SYSTOLIC BLOOD PRESSURE: 120 MMHG

## 2018-10-24 DIAGNOSIS — N92.6 IRREGULAR MENSES: ICD-10-CM

## 2018-10-24 DIAGNOSIS — N92.6 IRREGULAR BLEEDING: Primary | ICD-10-CM

## 2018-10-24 DIAGNOSIS — D25.1 FIBROIDS, INTRAMURAL: ICD-10-CM

## 2018-10-24 PROCEDURE — 58100 BIOPSY OF UTERUS LINING: CPT | Performed by: OBSTETRICS & GYNECOLOGY

## 2018-10-24 NOTE — PROGRESS NOTES
Endometrial biopsy  Date/Time: 10/24/2018 10:59 AM  Performed by: Johan Guo  Authorized by: Johan Guo     Consent:     Consent obtained:  Written    Consent given by:  Patient    Procedural risks discussed:  Bleeding, failure rate, infection and repeat procedure    Patient questions answered: yes      Patient agrees, verbalizes understanding, and wants to proceed: yes      Educational handouts given: yes      Instructions and paperwork completed: yes    Indication:     Indications: Other disorder of menstruation and other abnormal bleeding from female genital tract    Pre-procedure:     Premeds:  Ibuprofen  Procedure:     Procedure: endometrial biopsy with Pipelle      A bivalve speculum was placed in the vagina: yes      Cervix cleaned and prepped: yes      The cervix was dilated: no      Uterus sounded: no      Uterus sound depth (cm):  10    Patient tolerated procedure well with no complications: yes      Unable to perform due to: pain    Findings:     Uterus size:  >14 weeks    Cervix: normal      Adnexa: normal    Comments:      Patient reports that she desired to proceed with hysterectomy  Patient advised to contact our surgery scheduler  Also discussed route of hysterectomy  Patient reports she desires a laparoscopic approach if possible  Patient counseled she may need to follow-up with a minimally invasive specialist who may be able to proceed with a laproscopic hysterectomy despite pt having an enlarged uterus

## 2018-10-28 LAB
CLINICAL INFO: NORMAL
PATH REPORT.FINAL DX SPEC: NORMAL
PROCEDURE TYPE: NORMAL
SPECIMEN SOURCE: NORMAL

## 2018-10-30 ENCOUNTER — CONSULT (OUTPATIENT)
Dept: GYNECOLOGY | Facility: CLINIC | Age: 48
End: 2018-10-30
Payer: COMMERCIAL

## 2018-10-30 VITALS
HEIGHT: 64 IN | SYSTOLIC BLOOD PRESSURE: 110 MMHG | BODY MASS INDEX: 31.76 KG/M2 | WEIGHT: 186 LBS | DIASTOLIC BLOOD PRESSURE: 78 MMHG

## 2018-10-30 DIAGNOSIS — R10.2 PELVIC PAIN: ICD-10-CM

## 2018-10-30 DIAGNOSIS — N92.0 MENORRHAGIA WITH REGULAR CYCLE: ICD-10-CM

## 2018-10-30 DIAGNOSIS — D21.9 FIBROIDS: Primary | ICD-10-CM

## 2018-10-30 PROCEDURE — 99245 OFF/OP CONSLTJ NEW/EST HI 55: CPT | Performed by: OBSTETRICS & GYNECOLOGY

## 2018-10-30 NOTE — LETTER
2018     Saeed Brown MD  [address]     Patient: Ana Sam   YOB: 1970   Date of Visit: 10/30/2018       Dear Dr Kevin Diaz: Thank you for referring Bobby Birmingham to me for evaluation  Below are my notes for this consultation  If you have questions, please do not hesitate to call me  I look forward to following your patient along with you  Sincerely,        Nini Norton,         CC: Shira Lee, DO Nini Norton DO  10/30/2018  1:06 PM  Sign at close encounter  Assessment/Plan:    Discussed options with patient including following, medical management, versus surgical management, either myomectomy or hysterectomy  Patient desires a hysterectomy  I discussed with patient supracervical versus total hysterectomy  Also discussed bilateral salpingectomy  Patient is opted to proceed with a laparoscopic supracervical hysterectomy with bilateral salpingectomy  The risks of the surgery were discussed including, but not limited to, infection, hemorrhage, bowel, bladder, vascular, ureteral injury, possible necessity for laparotomy  Also discussed risks of fibroid being malignant at approximately 1 in 400  The plan will be in situ in bag morcellation to possibly decrease risk of for upstaging malignancy if final pathology reveals a leiomyosarcoma     Diagnoses and all orders for this visit:    Fibroids    Pelvic pain    Menorrhagia with regular cycle        Subjective:      Patient ID: Ana Sam is a 57 y o  female  HPI   referred to office by Dr Kevin Diaz for consideration of laparoscopic hysterectomy  Patient states that over the past several months 2 years she has been experiencing increasing pelvic pressure and menstrual cramps  Pelvic pressure is throughout the month but this does become worse at the time of her menses  Her menses have also been becoming heavier over past couple months    Patient also complaining of increased frequency of urination lower back discomfort  She had an ultrasound done recently which revealed a 10 4 cm fibroid  INDICATION:  57years old  R10 2: Pelvic and perineal pain  D25 9: Leiomyoma of uterus, unspecified      COMPARISON: Pelvic sonogram 9/29/2017     TECHNIQUE:   Transabdominal pelvic ultrasound was performed in sagittal and transverse planes with a curvilinear transducer  Additional transvaginal imaging was performed to better evaluate the endometrium and ovaries  Imaging included volumetric   sweeps as well as traditional still imaging technique      FINDINGS:     UTERUS:  The uterus is retroverted in position, measuring 11 7 x 10 3 x 8 cm  Heterogeneous hypoechoic posterior intramural leiomyoma approximately 9 3 x 7 8 x 10 4 cm and previously 7 5 x 5 9 x 9 1 cm  These measurements obtained on the transabdominal view and compared to the measurements on the transabdominal views on the prior   study  The cervix shows no suspicious abnormality      ENDOMETRIUM:    Normal caliber of 7 mm  Homogenous and normal in appearance      OVARIES/ADNEXA:  Right ovary:  2 3 x 1 5 x 2 3 cm  No suspicious right ovarian abnormality  Doppler flow within normal limits      Left ovary:  2 7 x 1 4 x 1 5 cm  No suspicious left ovarian abnormality  Doppler flow within normal limits      No suspicious adnexal mass or loculated collections  There is no free fluid      IMPRESSION:     Interval enlargement of large uterine posterior intramural fibroid measuring approximately 9 3 x 7 8 x 10 4 cm and previously 7 5 x 5 9 x 9 1 cm      Patient states that she denies no further pregnancies  Endometrial biopsy 10/24 benign    The following portions of the patient's history were reviewed and updated as appropriate:   She  has a past medical history of Right flank pain    She   Patient Active Problem List    Diagnosis Date Noted    Fibroids, intramural 10/24/2018    Irregular menses 10/24/2018    Enlarged uterus 2017    Chronic right-sided thoracic back pain 2017    Hot flashes 2017    Acid reflux disease 2017     She  has a past surgical history that includes Vaginal delivery  Her family history includes Breast cancer in her maternal aunt and mother; Colon cancer in her maternal grandfather; Lung cancer in her paternal grandmother; Ovarian cancer in her maternal grandmother  She  reports that she has never smoked  She has never used smokeless tobacco  She reports that she drinks alcohol  She reports that she does not use drugs  No current outpatient prescriptions on file  No current facility-administered medications for this visit  No current outpatient prescriptions on file prior to visit  No current facility-administered medications on file prior to visit  She has No Known Allergies       Review of Systems   Constitutional: Negative  Gastrointestinal: Negative  Genitourinary: Positive for frequency, menstrual problem and pelvic pain  Negative for decreased urine volume, dyspareunia, dysuria, genital sores, hematuria, urgency, vaginal bleeding, vaginal discharge and vaginal pain  Allergic/Immunologic: Negative   referred to office by Dr Robbin Ortiz for consideration of laparoscopic hysterectomy  Patient states that over the past several months 2 years she has been experiencing increasing pelvic pressure and menstrual cramps  Pelvic pressure is throughout the month but this does become worse at the time of her menses  Her menses have also been becoming heavier over past couple months  Patient also complaining of increased frequency of urination lower back discomfort  She had an ultrasound done recently which revealed a 10 4 cm fibroid        Objective:      /78   Ht 5' 4" (1 626 m)   Wt 84 4 kg (186 lb)   LMP 10/16/2018   BMI 31 93 kg/m²           Physical Exam   Constitutional: She appears well-developed and well-nourished  Neck: Normal range of motion  Neck supple  No thyromegaly present  Cardiovascular: Normal rate, regular rhythm and normal heart sounds  Pulmonary/Chest: Effort normal and breath sounds normal  No respiratory distress  Abdominal: Soft  Bowel sounds are normal  She exhibits mass  She exhibits no distension  There is no tenderness  There is no rebound and no guarding  Hernia confirmed negative in the right inguinal area and confirmed negative in the left inguinal area  Genitourinary: There is no rash or lesion on the right labia  There is no rash or lesion on the left labia  Uterus is enlarged  Uterus is not deviated, not fixed and not tender  Cervix exhibits no motion tenderness, no discharge and no friability  No bleeding in the vagina  No vaginal discharge found  Genitourinary Comments: C/w 14-16 wk fibroid uteruus   Lymphadenopathy:        Right: No inguinal adenopathy present  Left: No inguinal adenopathy present

## 2018-10-30 NOTE — PROGRESS NOTES
Assessment/Plan:    Discussed options with patient including following, medical management, versus surgical management, either myomectomy or hysterectomy  Patient desires a hysterectomy  I discussed with patient supracervical versus total hysterectomy  Also discussed bilateral salpingectomy  Patient is opted to proceed with a laparoscopic supracervical hysterectomy with bilateral salpingectomy  The risks of the surgery were discussed including, but not limited to, infection, hemorrhage, bowel, bladder, vascular, ureteral injury, possible necessity for laparotomy  Also discussed risks of fibroid being malignant at approximately 1 in 400  The plan will be in situ in bag morcellation to possibly decrease risk of for upstaging malignancy if final pathology reveals a leiomyosarcoma     Diagnoses and all orders for this visit:    Fibroids    Pelvic pain    Menorrhagia with regular cycle        Subjective:      Patient ID: Lefi Calhoun is a 57 y o  female  HPI   referred to office by Dr Thu Minaya for consideration of laparoscopic hysterectomy  Patient states that over the past several months 2 years she has been experiencing increasing pelvic pressure and menstrual cramps  Pelvic pressure is throughout the month but this does become worse at the time of her menses  Her menses have also been becoming heavier over past couple months  Patient also complaining of increased frequency of urination lower back discomfort  She had an ultrasound done recently which revealed a 10 4 cm fibroid  INDICATION:  57years old  R10 2: Pelvic and perineal pain  D25 9: Leiomyoma of uterus, unspecified      COMPARISON: Pelvic sonogram 2017     TECHNIQUE:   Transabdominal pelvic ultrasound was performed in sagittal and transverse planes with a curvilinear transducer  Additional transvaginal imaging was performed to better evaluate the endometrium and ovaries    Imaging included volumetric   sweeps as well as traditional still imaging technique      FINDINGS:     UTERUS:  The uterus is retroverted in position, measuring 11 7 x 10 3 x 8 cm  Heterogeneous hypoechoic posterior intramural leiomyoma approximately 9 3 x 7 8 x 10 4 cm and previously 7 5 x 5 9 x 9 1 cm  These measurements obtained on the transabdominal view and compared to the measurements on the transabdominal views on the prior   study  The cervix shows no suspicious abnormality      ENDOMETRIUM:    Normal caliber of 7 mm  Homogenous and normal in appearance      OVARIES/ADNEXA:  Right ovary:  2 3 x 1 5 x 2 3 cm  No suspicious right ovarian abnormality  Doppler flow within normal limits      Left ovary:  2 7 x 1 4 x 1 5 cm  No suspicious left ovarian abnormality  Doppler flow within normal limits      No suspicious adnexal mass or loculated collections  There is no free fluid      IMPRESSION:     Interval enlargement of large uterine posterior intramural fibroid measuring approximately 9 3 x 7 8 x 10 4 cm and previously 7 5 x 5 9 x 9 1 cm      Patient states that she denies no further pregnancies  Endometrial biopsy 10/24 benign    The following portions of the patient's history were reviewed and updated as appropriate:   She  has a past medical history of Right flank pain  She   Patient Active Problem List    Diagnosis Date Noted    Fibroids, intramural 10/24/2018    Irregular menses 10/24/2018    Enlarged uterus 09/25/2017    Chronic right-sided thoracic back pain 06/05/2017    Hot flashes 06/05/2017    Acid reflux disease 02/27/2017     She  has a past surgical history that includes Vaginal delivery  Her family history includes Breast cancer in her maternal aunt and mother; Colon cancer in her maternal grandfather; Lung cancer in her paternal grandmother; Ovarian cancer in her maternal grandmother  She  reports that she has never smoked  She has never used smokeless tobacco  She reports that she drinks alcohol   She reports that she does not use drugs  No current outpatient prescriptions on file  No current facility-administered medications for this visit  No current outpatient prescriptions on file prior to visit  No current facility-administered medications on file prior to visit  She has No Known Allergies       Review of Systems   Constitutional: Negative  Gastrointestinal: Negative  Genitourinary: Positive for frequency, menstrual problem and pelvic pain  Negative for decreased urine volume, dyspareunia, dysuria, genital sores, hematuria, urgency, vaginal bleeding, vaginal discharge and vaginal pain  Allergic/Immunologic: Negative   referred to office by Dr Jason Hernandez for consideration of laparoscopic hysterectomy  Patient states that over the past several months 2 years she has been experiencing increasing pelvic pressure and menstrual cramps  Pelvic pressure is throughout the month but this does become worse at the time of her menses  Her menses have also been becoming heavier over past couple months  Patient also complaining of increased frequency of urination lower back discomfort  She had an ultrasound done recently which revealed a 10 4 cm fibroid  Objective:      /78   Ht 5' 4" (1 626 m)   Wt 84 4 kg (186 lb)   LMP 10/16/2018   BMI 31 93 kg/m²          Physical Exam   Constitutional: She appears well-developed and well-nourished  Neck: Normal range of motion  Neck supple  No thyromegaly present  Cardiovascular: Normal rate, regular rhythm and normal heart sounds  Pulmonary/Chest: Effort normal and breath sounds normal  No respiratory distress  Abdominal: Soft  Bowel sounds are normal  She exhibits mass  She exhibits no distension  There is no tenderness  There is no rebound and no guarding  Hernia confirmed negative in the right inguinal area and confirmed negative in the left inguinal area  Genitourinary: There is no rash or lesion on the right labia   There is no rash or lesion on the left labia  Uterus is enlarged  Uterus is not deviated, not fixed and not tender  Cervix exhibits no motion tenderness, no discharge and no friability  No bleeding in the vagina  No vaginal discharge found  Genitourinary Comments: C/w 14-16 wk fibroid uteruus   Lymphadenopathy:        Right: No inguinal adenopathy present  Left: No inguinal adenopathy present

## 2018-11-23 ENCOUNTER — PREP FOR PROCEDURE (OUTPATIENT)
Dept: GYNECOLOGY | Facility: CLINIC | Age: 48
End: 2018-11-23

## 2018-11-23 DIAGNOSIS — D25.9 UTERINE LEIOMYOMA, UNSPECIFIED LOCATION: Primary | ICD-10-CM

## 2018-11-23 RX ORDER — CEFAZOLIN SODIUM 1 G/50ML
1000 SOLUTION INTRAVENOUS ONCE
Status: CANCELLED | OUTPATIENT
Start: 2018-12-04

## 2018-11-23 RX ORDER — CEFAZOLIN SODIUM 1 G/50ML
1000 SOLUTION INTRAVENOUS ONCE
Status: CANCELLED | OUTPATIENT
Start: 2018-11-23

## 2018-11-29 RX ORDER — IBUPROFEN 200 MG
400 TABLET ORAL EVERY 6 HOURS PRN
COMMUNITY

## 2018-11-29 NOTE — H&P
Assessment/Plan:     Discussed options with patient including following, medical management, versus surgical management, either myomectomy or hysterectomy  Patient desires a hysterectomy  I discussed with patient supracervical versus total hysterectomy  Also discussed bilateral salpingectomy  Patient is opted to proceed with a laparoscopic supracervical hysterectomy with bilateral salpingectomy  The risks of the surgery were discussed including, but not limited to, infection, hemorrhage, bowel, bladder, vascular, ureteral injury, possible necessity for laparotomy      Also discussed risks of fibroid being malignant at approximately 1 in 400  The plan will be in situ in bag morcellation to possibly decrease risk of for upstaging malignancy if final pathology reveals a leiomyosarcoma      Diagnoses and all orders for this visit:     Fibroids     Pelvic pain     Menorrhagia with regular cycle         Subjective:       Patient ID: Bard Arreola is a 57 y o  female      HPI   referred to office by Dr Heriberto Denise for consideration of laparoscopic hysterectomy  Patient states that over the past several months 2 years she has been experiencing increasing pelvic pressure and menstrual cramps  Pelvic pressure is throughout the month but this does become worse at the time of her menses  Her menses have also been becoming heavier over past couple months  Patient also complaining of increased frequency of urination lower back discomfort    She had an ultrasound done recently which revealed a 10 4 cm fibroid        INDICATION:  56 years old   R10 2: Pelvic and perineal pain  D25 9: Leiomyoma of uterus, unspecified      COMPARISON: Pelvic sonogram 2017     TECHNIQUE:   Transabdominal pelvic ultrasound was performed in sagittal and transverse planes with a curvilinear transducer   Additional transvaginal imaging was performed to better evaluate the endometrium and ovaries   Imaging included volumetric   sweeps as well as traditional still imaging technique      FINDINGS:     UTERUS:  The uterus is retroverted in position, measuring 11 7 x 10 3 x 8 cm  Heterogeneous hypoechoic posterior intramural leiomyoma approximately 9 3 x 7 8 x 10 4 cm and previously 7 5 x 5 9 x 9 1 cm   These measurements obtained on the transabdominal view and compared to the measurements on the transabdominal views on the prior   study  The cervix shows no suspicious abnormality      ENDOMETRIUM:    Normal caliber of 7 mm  Homogenous and normal in appearance      OVARIES/ADNEXA:  Right ovary:  2 3 x 1 5 x 2 3 cm  No suspicious right ovarian abnormality  Doppler flow within normal limits      Left ovary:  2 7 x 1 4 x 1 5 cm  No suspicious left ovarian abnormality  Doppler flow within normal limits      No suspicious adnexal mass or loculated collections  There is no free fluid      IMPRESSION:     Interval enlargement of large uterine posterior intramural fibroid measuring approximately 9 3 x 7 8 x 10 4 cm and previously 7 5 x 5 9 x 9 1 cm      Patient states that she denies no further pregnancies  Endometrial biopsy 10/24 benign     The following portions of the patient's history were reviewed and updated as appropriate:   She  has a past medical history of Right flank pain  She        Patient Active Problem List     Diagnosis Date Noted    Fibroids, intramural 10/24/2018    Irregular menses 10/24/2018    Enlarged uterus 09/25/2017    Chronic right-sided thoracic back pain 06/05/2017    Hot flashes 06/05/2017    Acid reflux disease 02/27/2017      She  has a past surgical history that includes Vaginal delivery  Her family history includes Breast cancer in her maternal aunt and mother; Colon cancer in her maternal grandfather; Lung cancer in her paternal grandmother; Ovarian cancer in her maternal grandmother  She  reports that she has never smoked  She has never used smokeless tobacco  She reports that she drinks alcohol   She reports that she does not use drugs  No current outpatient prescriptions on file       No current facility-administered medications for this visit        No current outpatient prescriptions on file prior to visit       No current facility-administered medications on file prior to visit        She has No Known Allergies        Review of Systems   Constitutional: Negative  Gastrointestinal: Negative  Genitourinary: Positive for frequency, menstrual problem and pelvic pain  Negative for decreased urine volume, dyspareunia, dysuria, genital sores, hematuria, urgency, vaginal bleeding, vaginal discharge and vaginal pain  Allergic/Immunologic: Negative   referred to office by Dr Heriberto Denise for consideration of laparoscopic hysterectomy  Patient states that over the past several months 2 years she has been experiencing increasing pelvic pressure and menstrual cramps  Pelvic pressure is throughout the month but this does become worse at the time of her menses  Her menses have also been becoming heavier over past couple months  Patient also complaining of increased frequency of urination lower back discomfort  She had an ultrasound done recently which revealed a 10 4 cm fibroid        Objective:        /78   Ht 5' 4" (1 626 m)   Wt 84 4 kg (186 lb)   LMP 10/16/2018   BMI 31 93 kg/m²             Physical Exam   Constitutional: She appears well-developed and well-nourished  Neck: Normal range of motion  Neck supple  No thyromegaly present  Cardiovascular: Normal rate, regular rhythm and normal heart sounds  Pulmonary/Chest: Effort normal and breath sounds normal  No respiratory distress  Abdominal: Soft  Bowel sounds are normal  She exhibits mass  She exhibits no distension  There is no tenderness  There is no rebound and no guarding  Hernia confirmed negative in the right inguinal area and confirmed negative in the left inguinal area  Genitourinary: There is no rash or lesion on the right labia  There is no rash or lesion on the left labia  Uterus is enlarged  Uterus is not deviated, not fixed and not tender  Cervix exhibits no motion tenderness, no discharge and no friability  No bleeding in the vagina  No vaginal discharge found  Genitourinary Comments: C/w 14-16 wk fibroid uteruus   Lymphadenopathy:        Right: No inguinal adenopathy present  Left: No inguinal adenopathy present

## 2018-11-29 NOTE — PRE-PROCEDURE INSTRUCTIONS
Pre-Surgery Instructions:   Medication Instructions    ibuprofen (MOTRIN) 200 mg tablet Patient was instructed by Physician and understands  Patient instructed no medication needed DOS  Patient instructed on use and purchase of chlorhexidine soap per hospital protocol    Patient instructed to stop all ASA, NSAIDS, vitamins and herbal supplements today for surgery 1/03/2018

## 2018-12-03 ENCOUNTER — ANESTHESIA EVENT (OUTPATIENT)
Dept: PERIOP | Facility: HOSPITAL | Age: 48
End: 2018-12-03
Payer: COMMERCIAL

## 2018-12-04 ENCOUNTER — ANESTHESIA (OUTPATIENT)
Dept: PERIOP | Facility: HOSPITAL | Age: 48
End: 2018-12-04
Payer: COMMERCIAL

## 2018-12-04 ENCOUNTER — HOSPITAL ENCOUNTER (OUTPATIENT)
Facility: HOSPITAL | Age: 48
Setting detail: OUTPATIENT SURGERY
Discharge: HOME | End: 2018-12-04
Attending: OBSTETRICS & GYNECOLOGY | Admitting: OBSTETRICS & GYNECOLOGY
Payer: COMMERCIAL

## 2018-12-04 VITALS
HEIGHT: 64 IN | OXYGEN SATURATION: 96 % | BODY MASS INDEX: 29.88 KG/M2 | WEIGHT: 175 LBS | TEMPERATURE: 98.1 F | RESPIRATION RATE: 18 BRPM | HEART RATE: 68 BPM | DIASTOLIC BLOOD PRESSURE: 63 MMHG | SYSTOLIC BLOOD PRESSURE: 102 MMHG

## 2018-12-04 DIAGNOSIS — G89.18 POST-OPERATIVE PAIN: Primary | ICD-10-CM

## 2018-12-04 DIAGNOSIS — D25.9 UTERINE LEIOMYOMA, UNSPECIFIED LOCATION: ICD-10-CM

## 2018-12-04 LAB
ABO GROUP BLD: NORMAL
BLD GP AB SCN SERPL QL: NEGATIVE
EXT PREGNANCY TEST URINE: NEGATIVE
GLUCOSE SERPL-MCNC: 98 MG/DL (ref 65–140)
HCT VFR BLD AUTO: 43.1 % (ref 34.8–46.1)
RH BLD: POSITIVE
SPECIMEN EXPIRATION DATE: NORMAL

## 2018-12-04 PROCEDURE — 88307 TISSUE EXAM BY PATHOLOGIST: CPT | Performed by: PATHOLOGY

## 2018-12-04 PROCEDURE — 86850 RBC ANTIBODY SCREEN: CPT | Performed by: OBSTETRICS & GYNECOLOGY

## 2018-12-04 PROCEDURE — 58544 LSH W/T/O UTERUS ABOVE 250 G: CPT | Performed by: OBSTETRICS & GYNECOLOGY

## 2018-12-04 PROCEDURE — 82948 REAGENT STRIP/BLOOD GLUCOSE: CPT

## 2018-12-04 PROCEDURE — 85014 HEMATOCRIT: CPT | Performed by: OBSTETRICS & GYNECOLOGY

## 2018-12-04 PROCEDURE — 81025 URINE PREGNANCY TEST: CPT | Performed by: OBSTETRICS & GYNECOLOGY

## 2018-12-04 PROCEDURE — 86901 BLOOD TYPING SEROLOGIC RH(D): CPT | Performed by: OBSTETRICS & GYNECOLOGY

## 2018-12-04 PROCEDURE — 86900 BLOOD TYPING SEROLOGIC ABO: CPT | Performed by: OBSTETRICS & GYNECOLOGY

## 2018-12-04 RX ORDER — NEOSTIGMINE METHYLSULFATE 1 MG/ML
INJECTION INTRAVENOUS AS NEEDED
Status: DISCONTINUED | OUTPATIENT
Start: 2018-12-04 | End: 2018-12-04 | Stop reason: SURG

## 2018-12-04 RX ORDER — ROCURONIUM BROMIDE 10 MG/ML
INJECTION, SOLUTION INTRAVENOUS AS NEEDED
Status: DISCONTINUED | OUTPATIENT
Start: 2018-12-04 | End: 2018-12-04 | Stop reason: SURG

## 2018-12-04 RX ORDER — FENTANYL CITRATE/PF 50 MCG/ML
50 SYRINGE (ML) INJECTION
Status: DISCONTINUED | OUTPATIENT
Start: 2018-12-04 | End: 2018-12-04 | Stop reason: HOSPADM

## 2018-12-04 RX ORDER — OXYCODONE HYDROCHLORIDE AND ACETAMINOPHEN 5; 325 MG/1; MG/1
1 TABLET ORAL EVERY 4 HOURS PRN
Qty: 10 TABLET | Refills: 0 | Status: SHIPPED | OUTPATIENT
Start: 2018-12-04 | End: 2018-12-14

## 2018-12-04 RX ORDER — HYDROMORPHONE HYDROCHLORIDE 2 MG/ML
INJECTION, SOLUTION INTRAMUSCULAR; INTRAVENOUS; SUBCUTANEOUS AS NEEDED
Status: DISCONTINUED | OUTPATIENT
Start: 2018-12-04 | End: 2018-12-04 | Stop reason: SURG

## 2018-12-04 RX ORDER — CEFAZOLIN SODIUM 1 G/50ML
1000 SOLUTION INTRAVENOUS EVERY 8 HOURS
Status: DISCONTINUED | OUTPATIENT
Start: 2018-12-04 | End: 2018-12-04

## 2018-12-04 RX ORDER — SODIUM CHLORIDE 9 MG/ML
125 INJECTION, SOLUTION INTRAVENOUS CONTINUOUS
Status: DISCONTINUED | OUTPATIENT
Start: 2018-12-04 | End: 2018-12-04

## 2018-12-04 RX ORDER — ONDANSETRON 2 MG/ML
INJECTION INTRAMUSCULAR; INTRAVENOUS AS NEEDED
Status: DISCONTINUED | OUTPATIENT
Start: 2018-12-04 | End: 2018-12-04 | Stop reason: SURG

## 2018-12-04 RX ORDER — ACETAMINOPHEN 325 MG/1
650 TABLET ORAL EVERY 4 HOURS PRN
Status: DISCONTINUED | OUTPATIENT
Start: 2018-12-04 | End: 2018-12-04 | Stop reason: HOSPADM

## 2018-12-04 RX ORDER — MIDAZOLAM HYDROCHLORIDE 1 MG/ML
INJECTION INTRAMUSCULAR; INTRAVENOUS AS NEEDED
Status: DISCONTINUED | OUTPATIENT
Start: 2018-12-04 | End: 2018-12-04 | Stop reason: SURG

## 2018-12-04 RX ORDER — MAGNESIUM HYDROXIDE 1200 MG/15ML
LIQUID ORAL AS NEEDED
Status: DISCONTINUED | OUTPATIENT
Start: 2018-12-04 | End: 2018-12-04 | Stop reason: HOSPADM

## 2018-12-04 RX ORDER — OXYCODONE HYDROCHLORIDE 5 MG/1
10 TABLET ORAL EVERY 4 HOURS PRN
Status: DISCONTINUED | OUTPATIENT
Start: 2018-12-04 | End: 2018-12-04 | Stop reason: HOSPADM

## 2018-12-04 RX ORDER — KETOROLAC TROMETHAMINE 30 MG/ML
30 INJECTION, SOLUTION INTRAMUSCULAR; INTRAVENOUS ONCE
Status: DISCONTINUED | OUTPATIENT
Start: 2018-12-04 | End: 2018-12-04 | Stop reason: HOSPADM

## 2018-12-04 RX ORDER — OXYCODONE HYDROCHLORIDE AND ACETAMINOPHEN 5; 325 MG/1; MG/1
1 TABLET ORAL EVERY 4 HOURS PRN
Status: DISCONTINUED | OUTPATIENT
Start: 2018-12-04 | End: 2018-12-04 | Stop reason: HOSPADM

## 2018-12-04 RX ORDER — PROPOFOL 10 MG/ML
INJECTION, EMULSION INTRAVENOUS AS NEEDED
Status: DISCONTINUED | OUTPATIENT
Start: 2018-12-04 | End: 2018-12-04 | Stop reason: SURG

## 2018-12-04 RX ORDER — MEPERIDINE HYDROCHLORIDE 50 MG/ML
12.5 INJECTION INTRAMUSCULAR; INTRAVENOUS; SUBCUTANEOUS AS NEEDED
Status: DISCONTINUED | OUTPATIENT
Start: 2018-12-04 | End: 2018-12-04 | Stop reason: HOSPADM

## 2018-12-04 RX ORDER — ALBUTEROL SULFATE 2.5 MG/3ML
2.5 SOLUTION RESPIRATORY (INHALATION) ONCE AS NEEDED
Status: DISCONTINUED | OUTPATIENT
Start: 2018-12-04 | End: 2018-12-04 | Stop reason: HOSPADM

## 2018-12-04 RX ORDER — HYDROMORPHONE HCL/PF 1 MG/ML
0.5 SYRINGE (ML) INJECTION
Status: DISCONTINUED | OUTPATIENT
Start: 2018-12-04 | End: 2018-12-04 | Stop reason: HOSPADM

## 2018-12-04 RX ORDER — GLYCOPYRROLATE 0.2 MG/ML
INJECTION INTRAMUSCULAR; INTRAVENOUS AS NEEDED
Status: DISCONTINUED | OUTPATIENT
Start: 2018-12-04 | End: 2018-12-04 | Stop reason: SURG

## 2018-12-04 RX ORDER — ONDANSETRON 2 MG/ML
4 INJECTION INTRAMUSCULAR; INTRAVENOUS EVERY 4 HOURS PRN
Status: DISCONTINUED | OUTPATIENT
Start: 2018-12-04 | End: 2018-12-04 | Stop reason: HOSPADM

## 2018-12-04 RX ORDER — FENTANYL CITRATE 50 UG/ML
INJECTION, SOLUTION INTRAMUSCULAR; INTRAVENOUS AS NEEDED
Status: DISCONTINUED | OUTPATIENT
Start: 2018-12-04 | End: 2018-12-04 | Stop reason: SURG

## 2018-12-04 RX ORDER — ONDANSETRON 2 MG/ML
4 INJECTION INTRAMUSCULAR; INTRAVENOUS EVERY 6 HOURS PRN
Status: DISCONTINUED | OUTPATIENT
Start: 2018-12-04 | End: 2018-12-04 | Stop reason: HOSPADM

## 2018-12-04 RX ADMIN — SODIUM CHLORIDE 125 ML/HR: 0.9 INJECTION, SOLUTION INTRAVENOUS at 12:02

## 2018-12-04 RX ADMIN — SODIUM CHLORIDE 125 ML/HR: 0.9 INJECTION, SOLUTION INTRAVENOUS at 16:24

## 2018-12-04 RX ADMIN — ROCURONIUM BROMIDE 5 MG: 10 INJECTION INTRAVENOUS at 14:22

## 2018-12-04 RX ADMIN — HYDROMORPHONE HYDROCHLORIDE 0.5 MG: 2 INJECTION, SOLUTION INTRAMUSCULAR; INTRAVENOUS; SUBCUTANEOUS at 14:15

## 2018-12-04 RX ADMIN — FENTANYL CITRATE 50 MCG: 50 INJECTION, SOLUTION INTRAMUSCULAR; INTRAVENOUS at 13:36

## 2018-12-04 RX ADMIN — HYDROMORPHONE HYDROCHLORIDE 0.5 MG: 2 INJECTION, SOLUTION INTRAMUSCULAR; INTRAVENOUS; SUBCUTANEOUS at 15:12

## 2018-12-04 RX ADMIN — DEXAMETHASONE SODIUM PHOSPHATE 8 MG: 10 INJECTION INTRAMUSCULAR; INTRAVENOUS at 13:40

## 2018-12-04 RX ADMIN — FENTANYL CITRATE 100 MCG: 50 INJECTION, SOLUTION INTRAMUSCULAR; INTRAVENOUS at 13:33

## 2018-12-04 RX ADMIN — HYDROMORPHONE HYDROCHLORIDE 0.5 MG: 2 INJECTION, SOLUTION INTRAMUSCULAR; INTRAVENOUS; SUBCUTANEOUS at 15:30

## 2018-12-04 RX ADMIN — PROPOFOL 200 MG: 10 INJECTION, EMULSION INTRAVENOUS at 13:40

## 2018-12-04 RX ADMIN — FENTANYL CITRATE 50 MCG: 50 INJECTION, SOLUTION INTRAMUSCULAR; INTRAVENOUS at 13:40

## 2018-12-04 RX ADMIN — ROCURONIUM BROMIDE 50 MG: 10 INJECTION INTRAVENOUS at 13:40

## 2018-12-04 RX ADMIN — HYDROMORPHONE HYDROCHLORIDE 0.5 MG: 2 INJECTION, SOLUTION INTRAMUSCULAR; INTRAVENOUS; SUBCUTANEOUS at 14:53

## 2018-12-04 RX ADMIN — GLYCOPYRROLATE 0.4 MG: 0.2 INJECTION, SOLUTION INTRAMUSCULAR; INTRAVENOUS at 15:29

## 2018-12-04 RX ADMIN — SODIUM CHLORIDE: 0.9 INJECTION, SOLUTION INTRAVENOUS at 14:36

## 2018-12-04 RX ADMIN — MIDAZOLAM 2 MG: 1 INJECTION INTRAMUSCULAR; INTRAVENOUS at 13:27

## 2018-12-04 RX ADMIN — CEFAZOLIN SODIUM 1000 MG: 1 SOLUTION INTRAVENOUS at 13:27

## 2018-12-04 RX ADMIN — NEOSTIGMINE METHYLSULFATE 3 MG: 1 INJECTION INTRAVENOUS at 15:29

## 2018-12-04 RX ADMIN — ONDANSETRON HYDROCHLORIDE 4 MG: 2 INJECTION, SOLUTION INTRAVENOUS at 14:53

## 2018-12-04 NOTE — ANESTHESIA POSTPROCEDURE EVALUATION
Post-Op Assessment Note      CV Status:  Stable    Mental Status:  Alert and awake    Hydration Status:  Euvolemic    PONV Controlled:  Controlled    Airway Patency:  Patent    Post Op Vitals Reviewed:  Yes              /50 (12/04/18 1547)    Temp 98 3 °F (36 8 °C) (12/04/18 1547)    Pulse 66 (12/04/18 1547)   Resp 16 (12/04/18 1547)    SpO2 95 % (12/04/18 1547)

## 2018-12-04 NOTE — ANESTHESIA PREPROCEDURE EVALUATION
Review of Systems/Medical History  Patient summary reviewed  Chart reviewed      Cardiovascular  Negative cardio ROS    Pulmonary  Negative pulmonary ROS        GI/Hepatic    GERD well controlled,        Negative  ROS        Endo/Other  Negative endo/other ROS      GYN  Negative gynecology ROS          Hematology  Negative hematology ROS      Musculoskeletal  Negative musculoskeletal ROS        Neurology  Negative neurology ROS      Psychology   Negative psychology ROS              Physical Exam    Airway    Mallampati score: I  TM Distance: <3 FB  Neck ROM: full     Dental   No notable dental hx     Cardiovascular  Comment: Negative ROS, Rhythm: regular, Rate: normal, Cardiovascular exam normal    Pulmonary  Pulmonary exam normal     Other Findings        Anesthesia Plan  ASA Score- 2     Anesthesia Type- general with ASA Monitors  Additional Monitors:   Airway Plan: ETT  Plan Factors- Patient instructed to abstain from smoking on day of procedure  Patient did not smoke on day of surgery  Induction- intravenous  Postoperative Plan- Plan for postoperative opioid use  Planned trial extubation    Informed Consent- Anesthetic plan and risks discussed with patient

## 2018-12-04 NOTE — DISCHARGE INSTRUCTIONS
Laparoscopic Hysterectomy   WHAT YOU NEED TO KNOW:   A hysterectomy is surgery to remove your uterus  Your ovaries, fallopian tubes, cervix, or part of your vagina may also need to be removed  The organs and tissue that will be removed depends on your medical condition  DISCHARGE INSTRUCTIONS:   Call 911 for any of the following:   · You feel lightheaded, short of breath, and have chest pain  · You cough up blood  Seek care immediately:   · Your arm or leg feels warm, tender, and painful  It may look swollen and red  · You have increasing abdominal or pelvic pain  · You have heavy vaginal bleeding that fills 1 or more sanitary pads in 1 hour  Contact your healthcare provider or gynecologist if:   · You have a fever  · You have nausea or are vomiting  · You feel pain or burning when you urinate, or you have trouble urinating  · You have pus or a foul-smelling odor coming from your vagina  · Your wound is red, swollen, or draining pus  · You feel pressure in your rectum  · You have questions or concerns about your condition or care  Medicines: You may  need any of the following:  · Prescription pain medicine  may be given  Ask your healthcare provider how to take this medicine safely  · NSAIDs , such as ibuprofen, help decrease swelling, pain, and fever  NSAIDs can cause stomach bleeding or kidney problems in certain people  If you take blood thinner medicine, always ask your healthcare provider if NSAIDs are safe for you  Always read the medicine label and follow directions  · Stool softeners  help treat or prevent constipation  · Take your medicine as directed  Contact your healthcare provider if you think your medicine is not helping or if you have side effects  Tell him or her if you are allergic to any medicine  Keep a list of the medicines, vitamins, and herbs you take  Include the amounts, and when and why you take them   Bring the list or the pill bottles to follow-up visits  Carry your medicine list with you in case of an emergency  Activity:   · Wear an abdominal binder as directed  An abdominal binder will decrease pain when you move or cough  · Rest as needed  Get up and move around as directed to help prevent blood clots  Start with short walks and slowly increase the distance every day  Limit the number of times you climb stairs to 2 times each day  Plan most of your daily activities on one level of your home  · Do not lift objects heavier than 10 pounds for 6 weeks  Avoid strenuous activity for 2 weeks  · Do not strain during bowel movements  High-fiber foods and extra liquids can help you prevent constipation  Examples of high-fiber foods are fruit and bran  Prune juice and water are good liquids to drink  · Do not have sex, use tampons, or douche for up to 8 weeks  Ask your healthcare provider if it is okay to take a tub bath  · Do not go into pools or hot tubs for 6 weeks or as directed  · Ask when it is safe for you to drive, return to work, and return to other regular activities  Wound care:  Care for your abdominal incisions as directed  Carefully wash around the wound with soap and water  It is okay to let the soap and water run over your incision  Do not  scrub your incision  Dry the area and put on new, clean bandages as directed  Change your bandages when they get wet or dirty  If you have strips of medical tape, let them fall off on their own  It may take 7 to 14 days for them to fall off  Check your incision every day for redness, swelling, or pus  Deep breathing:  Take deep breaths and cough 10 times each hour  This will decrease your risk for a lung infection  Take a deep breath and hold it for as long as you can  Let the air out and then cough strongly  Deep breaths help open your airway  You may be given an incentive spirometer to help you take deep breaths   Put the plastic piece in your mouth and take a slow, deep breath, then let the air out and cough  Repeat these steps 10 times every hour  Get support: This surgery may be life-changing for you and your family  You will no longer be able to get pregnant  Sudden changes in the levels of your hormones may occur and cause mood swings and depression  You may feel angry, sad, or frightened, or cry frequently and unexpectedly  These feelings are normal  Talk to your healthcare provider about where you can get support  You can also ask if hormone replacement medicine is right for you  Follow up with your healthcare provider or gynecologist as directed: You may need to return to have stitches removed, and for other tests  Write down your questions so you remember to ask them during your visits  © 2017 [facility] Information is for End User's use only and may not be sold, redistributed or otherwise used for commercial purposes  All illustrations and images included in CareNotes® are the copyrighted property of [employer]  or Abimael Gutierrez  The above information is an  only  It is not intended as medical advice for individual conditions or treatments  Talk to your doctor, nurse or pharmacist before following any medical regimen to see if it is safe and effective for you  Salpingectomy   WHAT YOU NEED TO KNOW:   A salpingectomy is surgery to remove one or both of your fallopian tubes  The fallopian tubes carry eggs from the ovaries to the uterus  They are part of a woman's reproductive system  A salpingectomy may be done to treat an ectopic pregnancy, cancer, endometriosis, or an infection  It may also be done to prevent pregnancy or some types of cancer  DISCHARGE INSTRUCTIONS:   Call 911 for any of the following:   · You feel lightheaded, short of breath, and have chest pain  · You cough up blood  · You have trouble breathing  Seek care immediately if:   · Your arm or leg feels warm, tender, and painful  It may look swollen and red  · Blood soaks through your bandage  · Your stitches come apart  · You soak through 1 sanitary pad in 1 hour  · You have trouble urinating or cannot urinate at all  Contact your healthcare provider if:   · You have a fever or chills  · Your wound is red, swollen, or draining pus  · You have pus or a foul-smelling odor coming from your vagina  · Your pain does not get better after you take your medicine  · You have nausea or are vomiting  · Your skin is itchy, swollen, or you have a rash  · You have questions or concerns about your condition or care  Medicines: You may need any of the following:  · Prescription pain medicine  may be given  Ask your healthcare provider how to take this medicine safely  · NSAIDs , such as ibuprofen, help decrease swelling, pain, and fever  NSAIDs can cause stomach bleeding or kidney problems in certain people  If you take blood thinner medicine, always ask your healthcare provider if NSAIDs are safe for you  Always read the medicine label and follow directions  · Take your medicine as directed  Contact your healthcare provider if you think your medicine is not helping or if you have side effects  Tell him or her if you are allergic to any medicine  Keep a list of the medicines, vitamins, and herbs you take  Include the amounts, and when and why you take them  Bring the list or the pill bottles to follow-up visits  Carry your medicine list with you in case of an emergency  Care for your wound as directed:  Ask your healthcare provider when your wound can get wet  Do not take a bath until your healthcare provider says it is okay  Take a shower only  Carefully wash around the wound with soap and water  Let the soap and water gently run over your incision  Do not  scrub your incision  Dry the area and put on new, clean bandages as directed  Change your bandages when they get wet or dirty   If you have strips of medical tape, let them fall off on their own  Activity:  Ask your healthcare provider when you can return to your normal activities  Do not douche, use tampons, or have sex until your healthcare provider says it is okay  These activities may cause infection  Do not exercise or lift anything heavy until your healthcare provider says it is okay  This may put too much stress on your incision  Follow up with your healthcare provider as directed:  Write down your questions so you remember to ask them during your visits  © 2017 [address] [employer] Information is for End User's use only and may not be sold, redistributed or otherwise used for commercial purposes  All illustrations and images included in CareNotes® are the copyrighted property of [employer] M [employer]  or Abimael Brenda  The above information is an  only  It is not intended as medical advice for individual conditions or treatments  Talk to your doctor, nurse or pharmacist before following any medical regimen to see if it is safe and effective for you  Cystoscopy   WHAT YOU NEED TO KNOW:   A cystoscopy is a procedure to look inside of your urethra and bladder using a cystoscope  A cystoscope is a small tube with a light and magnifying camera on the end  The procedure is used to diagnose and treat conditions of the bladder, urethra, and prostate  The procedure is also done to remove stones or blood clots from the urethra or bladder  Your healthcare provider may do other tests, such as ureteroscopy, during a cystoscopy  DISCHARGE INSTRUCTIONS:   Call 911 if:   · You suddenly have chest pain or trouble breathing  Seek care immediately if:   · Your urine turns from pink to red, or you have clots in your urine  · You cannot urinate and your bladder feels full  · Your pain or burning becomes worse or lasts longer than 2 days    Contact your healthcare provider or urologist if:   · Your urine stays pink for longer than 3 days     · You urinate less than normal, or still feel like you have to urinate after you use the bathroom  · Your skin is itchy, swollen, or has a new rash  · You have a fever and chills  · You have questions or concerns about your condition or care  Medicines: You may  be given any of the following:  · Antibiotics  help treat or prevent a bacterial infection  · Acetaminophen  decreases pain and fever  It is available without a doctor's order  Ask how much to take and how often to take it  Follow directions  Read the labels of all other medicines you are using to see if they also contain acetaminophen, or ask your doctor or pharmacist  Acetaminophen can cause liver damage if not taken correctly  Do not use more than 4 grams (4,000 milligrams) total of acetaminophen in one day  · Take your medicine as directed  Contact your healthcare provider if you think your medicine is not helping or if you have side effects  Tell him or her if you are allergic to any medicine  Keep a list of the medicines, vitamins, and herbs you take  Include the amounts, and when and why you take them  Bring the list or the pill bottles to follow-up visits  Carry your medicine list with you in case of an emergency  Follow up with your healthcare provider as directed: You may need to have another cystoscopy  Write down your questions so you remember to ask them during your visits  Self-care:   · Drink at least 3 to 4 glasses of water daily for 2 days after your procedure  Do not drink acidic juices such as orange juice and lemonade  Drink water to help prevent blood clots from forming  It can also help decrease the amount of acid in your urine  Acid in your urine may increase the burning feeling when you urinate  · Sit in a warm tub of water  Warm water may relieve pain and bladder spasms  · Do not have sex  until your healthcare provider tells you it is okay   Sex may increase your risk for a urinary tract infection  © 2017 [facility] Information is for End User's use only and may not be sold, redistributed or otherwise used for commercial purposes  All illustrations and images included in CareNotes® are the copyrighted property of [employer] M [employer] Inc  or Abimael Gutierrez  The above information is an  only  It is not intended as medical advice for individual conditions or treatments  Talk to your doctor, nurse or pharmacist before following any medical regimen to see if it is safe and effective for you  Oxycodone/Acetaminophen (By mouth)   Acetaminophen (b-eksx-v-MIN-oh-fen), Oxycodone Hydrochloride (rz-w-DRX-done lea-droe-KLOR-mary jo)  Treats moderate to moderately severe pain  This medicine is a narcotic pain reliever  Brand Name(s): Endocet, Percocet, Primlev, Xartemis XR   There may be other brand names for this medicine  When This Medicine Should Not Be Used: This medicine is not right for everyone  Do not use it if you had an allergic reaction to acetaminophen or oxycodone, or if you have serious breathing problems or paralytic ileus  How to Use This Medicine:   Capsule, Liquid, Tablet, Long Acting Tablet  · Your doctor will tell you how much medicine to use  Do not use more than directed  · An overdose can be dangerous  Follow directions carefully so you do not get too much medicine at one time  · Oral liquid: Measure the oral liquid medicine with a marked measuring spoon, oral syringe, or medicine cup  · Swallow the extended-release tablet whole  Do not crush, break, or chew it  Do not lick or wet the tablet before placing it in your mouth  Do not give this medicine through a feeding tube  · This medicine should come with a Medication Guide  Ask your pharmacist for a copy if you do not have one  · Missed dose: If you miss a dose of this medicine, skip the missed dose and go back to your regular dosing schedule  Do not double doses    · Store the medicine in a closed container at room temperature, away from heat, moisture, and direct light  Ask your pharmacist about the best way to dispose of medicine you do not use  Drugs and Foods to Avoid:   Ask your doctor or pharmacist before using any other medicine, including over-the-counter medicines, vitamins, and herbal products  · Do not use Xartemis XR if you are using or have used an MAO inhibitor in the past 14 days  · Some medicines can affect how this medicine works  Tell your doctor if you are using any of the following:   ¨ Carbamazepine, erythromycin, ketoconazole, lamotrigine, mirtazapine, naltrexone, phenytoin, propranolol, rifampin, ritonavir, tramadol, trazodone, or zidovudine  ¨ Birth control pills  ¨ Diuretic (water pill)  ¨ Medicine to treat depression  ¨ Phenothiazine medicine  ¨ Triptan medicine to treat migraine headaches  · Do not drink alcohol while you are using this medicine  Acetaminophen can damage your liver, and alcohol can increase this risk  Do not take acetaminophen without asking your doctor if you have 3 or more drinks of alcohol every day  · Tell your doctor if you use anything else that makes you sleepy  Some examples are allergy medicine, narcotic pain medicine, and alcohol  Tell your doctor if you are using buprenorphine, butorphanol, nalbuphine, pentazocine, a benzodiazepine, or a muscle relaxer  Warnings While Using This Medicine:   · Tell your doctor if you are pregnant or breastfeeding, or if you have kidney disease, liver disease, heart disease, low blood pressure, breathing problems or lung disease (such as asthma, COPD), thyroid problems, Cache disease, pancreas or gallbladder problems, prostate problems, trouble urinating, or a stomach problems, or a history of head injury or brain damage, seizures, or alcohol or drug abuse  Tell your doctor if you are allergic to codeine    · This medicine may cause the following problems:  ¨ High risk of overdose, which can lead to death  ¨ Respiratory depression (serious breathing problem that can be life-threatening)  ¨ Liver problems  ¨ Serious skin reactions  ¨ Serotonin syndrome (when used with certain medicines)  · This medicine may make you dizzy or drowsy  Do not drive or do anything that could be dangerous until you know how this medicine affects you  Sit or lie down if you feel dizzy  Stand up carefully  · This medicine contains acetaminophen  Read the labels of all other medicines you are using to see if they also contain acetaminophen, or ask your doctor or pharmacist  Steve Johnson not use more than 4 grams (4,000 milligrams) total of acetaminophen in one day  · This medicine can be habit-forming  Do not use more than your prescribed dose  Call your doctor if you think your medicine is not working  · Do not stop using this medicine suddenly  Your doctor will need to slowly decrease your dose before you stop it completely  · This medicine could cause infertility  Talk with your doctor before using this medicine if you plan to have children  · This medicine may cause constipation, especially with long-term use  Ask your doctor if you should use a laxative to prevent and treat constipation  · Keep all medicine out of the reach of children  Never share your medicine with anyone    Possible Side Effects While Using This Medicine:   Call your doctor right away if you notice any of these side effects:  · Allergic reaction: Itching or hives, swelling in your face or hands, swelling or tingling in your mouth or throat, chest tightness, trouble breathing  · Anxiety, restlessness, fast heartbeat, fever, muscle spasms, twitching, diarrhea, seeing or hearing things that are not there  · Blistering, peeling, red skin rash  · Blue lips, fingernails, or skin  · Dark urine or pale stools, loss of appetite, stomach pain, yellow skin or eyes  · Extreme weakness, shallow breathing, uneven heartbeat, seizures, sweating, or cold or clammy skin  · Severe confusion, lightheadedness, dizziness, or fainting  · Severe constipation, nausea, or vomiting  · Trouble breathing or slow breathing  If you notice these less serious side effects, talk with your doctor:   · Headache  · Mild constipation, nausea, or vomiting  · Mild sleepiness or drowsiness  If you notice other side effects that you think are caused by this medicine, tell your doctor  Call your doctor for medical advice about side effects  You may report side effects to FDA at 2-775-FDA-0715  © 2017 [facility] [facility] Information is for End User's use only and may not be sold, redistributed or otherwise used for commercial purposes  The above information is an  only  It is not intended as medical advice for individual conditions or treatments  Talk to your doctor, nurse or pharmacist before following any medical regimen to see if it is safe and effective for you

## 2018-12-04 NOTE — OP NOTE
OPERATIVE REPORT  PATIENT NAME: Carolyn Putnam    :  1970  MRN: [id number]  Pt Location: [facility] 05    SURGERY DATE: 2018    Surgeon(s) and Role:     May Duran, DO - Primary     * Chrissy Caldwell MD - Fellow, Marisol Still MD - Resident, Assisting    Preop Diagnosis:  Uterine leiomyoma, unspecified location [D25 9]    Post-Op Diagnosis Codes:     * Uterine leiomyoma, unspecified location [D25 9]    Procedure(s) (LRB):  HYSTERECTOMY LAPAROSCOPIC SUPRACERVICAL ([address] [facility] [address]) AND REMOVAL OF BOTH TUBES (Bilateral)  CYSTOSCOPY (N/A)    Specimen(s):  ID Type Source Tests Collected by Time Destination   1 : Uterus with bilateral fallopian tubes Tissue Uterus TISSUE Zina HannonDO 2018 1501        Estimated Blood Loss:   100 mL    Drains:  [REMOVED] Urethral Catheter Double-lumen;Non-latex 16 Fr  (Removed)   Number of days: 0       Anesthesia Type:   General    Operative Indications:  Uterine leiomyoma, unspecified location [D25 9]    Operative Findings:  Globular fibroid uterus, dominant 9cm posterior fundal fibroid, weight 530gm  Normal-appearing tubes and ovaries bilaterally  Cystoscopy: efflux noted from bilateral ureteral orifices  No mesh, suture material, or injury noted to bladder lumen  Complications:   None    Procedure and Technique:  Appropriate preoperative antibiotics chosen per ACOG guidelines were given  Bilateral SCDs were placed in the lower extremities for DVT prevention prior to the institution of anesthesia  No bladder, ureteral, viscus, or solid organ injury were noted at the end of the procedure  The patient was identified in the holding area by the operating room staff and attending physician  She was taken to the operating room where anesthesia was instituted without complications  She was placed in the dorsal lithotomy position with the legs in [address] [place] [address] with care taken to avoid excessive flexion or extension of her lower extremities  The patient was prepped and draped in the usual sterile fashion  A Roman catheter was inserted  A 10cm Jenny tip was inserted into the uterus without the colpotomy cup for manipulation  Next, the deepest part of the umbilicus was grasped and everted with an Allis clamp  The edges of the umbilicus were grasped with 2 towel clamps to elevate the abdominal wall away from the abdominal organs and vessels  A Veress needle was gently inserted into the umbilicus at a 45 degree angle  Once entry into the abdominal cavity was confirmed, the abdomen was insufflated with CO2 gas to 15 mmHg  Next, a 10 mm diagnostic laparoscope was inserted via direct entry into the umbilicus  The patient was then placed in Trendelenburg for better visualization of the pelvis  Next, a 5 mm trocar was inserted into the left lateral quadrant under direct visualization  Then a 5 mm trocar was inserted into the right lateral quadrant under direct visualization  First, a survey of the cavity was performed, and we confirmed the above-mentioned findings  A salpingectomy was performed with the Ligasure device by transecting the mesosalpinx of the right fallopian tube to the mesosalpinx to the level of the cornua  Next, the utero-ovarian ligament was coagulated and transected with the Ligasure device  Next, the left round ligament was entered and dissected toward the level of the internal cervical os to develop the bladder flap  Left uterine arteries were skeletonized and coagulated  Next, the right uteroovarian ligament was coagulated and transected with the Ligasure device  The right round ligament was transected and the anterior leaf of the broad ligament was entered  The bladder flap was completed from the right side  The posterior leaf of the broad ligament was divided to lateralize the ureter  The right uterine arteries were identified, coagulated and transected with the Ligasure device   The right cardinal ligament was coagulated and divided to lateralize the uterine artery pedicle  Attention was then turned to the left side  The left uterine artery was re-identified, coagulated and transected  The cardinal ligament was also identified and transected  The uterus was blanching at this time, confirming transection of the complete blood supply to the level of the internal os  Next, the Jenny tip was removed from the uterus  The bipolar Kindra loop was introduced and the loop tightened at the level of the cervical canal  Once the loop was cleared of bowel and other major structures, the loop was activated and the corpus of the uterus was amputated without difficulty  Next, a 2 5-cm incision was created at the level of the suprapubic area in the midline in a horizontal fashion  To introduce the Sumit retractor  A 15mm specimen bag was placed into the abdomen through this minilaparotomy incision  Next, the Gelpoint cover was placed over the retractor to maintain pneumoperitoneum  Next, the specimen was placed into the bag and the tail of the bag pulled through the suprapubic incision  The edges were rolled down and it was confirmed that the specimen was safely in the bag  The specimen was morcellated manually without difficulty with the scalpel  The bag was then removed once the specimen was removed  The Sumit retractor was removed  The fascia was closed with 0 Vicryl suture in a running stitch  The subcutaneous layer was closed with 2-0 plain interrupted sutures  The skin was closed with 4-0 Monocryl in a subcuticular fashion       A final laparoscopic survey of the pelvic cavity and we confirmed good hemostasis  The ureters were seen vermiculating normally bilaterally  The trocars were removed  The gas was allowed to escape  The skin incisions were closed with plain interrupted sutures  The Roman was removed  A cystoscopy was done confirming no injury to the bladder and urinary efflux noted from bilateral ureters   The bladder was drained with the Roman via straight catheterization  The patient tolerated the procedure well  The sponge, needle and instrument count were correct x 2  The patient tolerated the procedure well  She was awakened from anesthesia and transferred to the recovery room in stable condition  Dr Johny Seth was present for the entire procedure      Patient Disposition:  PACU     SIGNATURE: Darian Rascon MD  DATE: December 4, 2018  TIME: 3:42 PM

## 2018-12-19 ENCOUNTER — DOCUMENTATION (OUTPATIENT)
Dept: GYNECOLOGY | Facility: CLINIC | Age: 48
End: 2018-12-19

## 2018-12-19 ENCOUNTER — OFFICE VISIT (OUTPATIENT)
Dept: GYNECOLOGY | Facility: CLINIC | Age: 48
End: 2018-12-19

## 2018-12-19 VITALS
BODY MASS INDEX: 30.77 KG/M2 | WEIGHT: 180.2 LBS | HEIGHT: 64 IN | SYSTOLIC BLOOD PRESSURE: 102 MMHG | DIASTOLIC BLOOD PRESSURE: 64 MMHG

## 2018-12-19 DIAGNOSIS — Z48.89 POSTOPERATIVE VISIT: Primary | ICD-10-CM

## 2018-12-19 PROCEDURE — 99024 POSTOP FOLLOW-UP VISIT: CPT | Performed by: OBSTETRICS & GYNECOLOGY

## 2018-12-19 NOTE — PROGRESS NOTES
PO check   S/P [facility] BS   Path: leiomyoma 530 gms/adenomyosis    PE: incisions healing well    Referred back to Dr Gloria Cantrell for ongoing gyn care

## 2018-12-19 NOTE — LETTER
December 19, 2018     Yasmine Prado MD  [address]    Patient: Carolyn Putnam   YOB: 1970   Date of Visit: 12/19/2018       Dear Dr Tanya Head: Thank you for referring Anthony Pastor to me for evaluation  Below are my notes for this consultation  If you have questions, please do not hesitate to call me  I look forward to following your patient along with you  Sincerely,        Blanca Deluna DO        CC: No Recipients  Blanca Deluna DO  12/19/2018  9:47 AM  Sign at close encounter  PO check   S/P [facility] BS   Path: leiomyoma 530 gms/adenomyosis    PE: incisions healing well    Referred back to Dr Tanya Head for ongoing gyn care

## 2019-08-08 ENCOUNTER — OFFICE VISIT (OUTPATIENT)
Dept: FAMILY MEDICINE CLINIC | Facility: CLINIC | Age: 49
End: 2019-08-08
Payer: COMMERCIAL

## 2019-08-08 VITALS
SYSTOLIC BLOOD PRESSURE: 110 MMHG | OXYGEN SATURATION: 97 % | DIASTOLIC BLOOD PRESSURE: 74 MMHG | TEMPERATURE: 99.2 F | HEIGHT: 64 IN | BODY MASS INDEX: 32.44 KG/M2 | HEART RATE: 75 BPM | WEIGHT: 190 LBS

## 2019-08-08 DIAGNOSIS — R30.0 DYSURIA: Primary | ICD-10-CM

## 2019-08-08 LAB
SL AMB  POCT GLUCOSE, UA: ABNORMAL
SL AMB LEUKOCYTE ESTERASE,UA: ABNORMAL
SL AMB POCT BILIRUBIN,UA: ABNORMAL
SL AMB POCT BLOOD,UA: ABNORMAL
SL AMB POCT CLARITY,UA: ABNORMAL
SL AMB POCT COLOR,UA: YELLOW
SL AMB POCT KETONES,UA: ABNORMAL
SL AMB POCT NITRITE,UA: ABNORMAL
SL AMB POCT PH,UA: 6.5
SL AMB POCT SPECIFIC GRAVITY,UA: 1.01
SL AMB POCT URINE PROTEIN: ABNORMAL
SL AMB POCT UROBILINOGEN: 0.2

## 2019-08-08 PROCEDURE — 99213 OFFICE O/P EST LOW 20 MIN: CPT | Performed by: FAMILY MEDICINE

## 2019-08-08 PROCEDURE — 3008F BODY MASS INDEX DOCD: CPT | Performed by: FAMILY MEDICINE

## 2019-08-08 PROCEDURE — 81003 URINALYSIS AUTO W/O SCOPE: CPT | Performed by: FAMILY MEDICINE

## 2019-08-08 PROCEDURE — 1036F TOBACCO NON-USER: CPT | Performed by: FAMILY MEDICINE

## 2019-08-08 NOTE — PROGRESS NOTES
Assessment/Plan:  Patient is a 61-year-old female seen with symptoms urine infection  Urine dip showed only trace, everything else negative  I will hold on treating for infection  If urine culture is negative, then will get a CT  No problem-specific Assessment & Plan notes found for this encounter  Diagnoses and all orders for this visit:    Dysuria  -     POCT urine dip auto non-scope  -     Urine culture    Other orders  -     Result          Subjective:   Chief Complaint   Patient presents with    Difficulty Urinating     Pt c/o pain and pressure while urinatiing  Pt alos has hesitantency and odor  Patient ID: Britany Conn is a 57 y o  female  Patient with pain on urination for a week or so  Urine also with order  Urine is dark in AM and as day goes on, it looks cloudy  Had hysterectomy in in November  Also when has to have a BM, has pressure in left lower quadrant  Hasn't seen blood in stools  The following portions of the patient's history were reviewed and updated as appropriate: allergies, current medications, past family history, past medical history, past social history, past surgical history and problem list     Review of Systems   Constitutional: Positive for chills and fever  HENT: Negative for congestion and sore throat  Respiratory: Negative for chest tightness  Cardiovascular: Negative for chest pain and palpitations  Gastrointestinal: Positive for abdominal pain  Negative for constipation, diarrhea and nausea  Genitourinary: Positive for dysuria and frequency  Negative for difficulty urinating  Skin: Negative  Neurological: Negative for dizziness and headaches  Psychiatric/Behavioral: Negative            Objective:      /74 (BP Location: Left arm, Patient Position: Sitting, Cuff Size: Standard)   Pulse 75   Temp 99 2 °F (37 3 °C) (Tympanic)   Ht 5' 4 17" (1 63 m)   Wt 86 2 kg (190 lb)   LMP 11/14/2018 (Approximate) Comment: Pt states it may have been the first two weeks in november  SpO2 97%   BMI 32 44 kg/m²          Physical Exam   Constitutional: She is oriented to person, place, and time  She appears well-developed  No distress  Neck: Carotid bruit is not present  No thyromegaly present  Cardiovascular: Normal rate, regular rhythm and normal heart sounds  Pulmonary/Chest: Effort normal and breath sounds normal    Abdominal: Soft  There is tenderness  Musculoskeletal: She exhibits no edema  Lymphadenopathy:     She has no cervical adenopathy  Neurological: She is alert and oriented to person, place, and time  Skin: Skin is warm and dry  Psychiatric: She has a normal mood and affect  Nursing note and vitals reviewed

## 2019-08-10 LAB
BACTERIA UR CULT: NORMAL
Lab: NORMAL

## 2019-08-12 ENCOUNTER — DOCUMENTATION (OUTPATIENT)
Dept: FAMILY MEDICINE CLINIC | Facility: CLINIC | Age: 49
End: 2019-08-12

## 2019-08-12 DIAGNOSIS — R31.29 MICROSCOPIC HEMATURIA: Primary | ICD-10-CM

## 2019-08-26 ENCOUNTER — HOSPITAL ENCOUNTER (OUTPATIENT)
Dept: CT IMAGING | Facility: HOSPITAL | Age: 49
Discharge: HOME | End: 2019-08-26
Payer: COMMERCIAL

## 2019-08-26 DIAGNOSIS — R31.29 MICROSCOPIC HEMATURIA: ICD-10-CM

## 2019-08-26 PROCEDURE — 74178 CT ABD&PLV WO CNTR FLWD CNTR: CPT

## 2019-08-26 RX ADMIN — IOHEXOL 120 ML: 350 INJECTION, SOLUTION INTRAVENOUS at 17:36

## 2019-09-04 DIAGNOSIS — R63.1 INCREASED THIRST: ICD-10-CM

## 2019-09-04 DIAGNOSIS — R31.29 MICROSCOPIC HEMATURIA: Primary | ICD-10-CM

## 2019-09-04 DIAGNOSIS — R30.0 DYSURIA: ICD-10-CM

## 2019-09-05 ENCOUNTER — TELEPHONE (OUTPATIENT)
Dept: FAMILY MEDICINE CLINIC | Facility: CLINIC | Age: 49
End: 2019-09-05

## 2019-09-05 DIAGNOSIS — N83.202 LEFT OVARIAN CYST: Primary | ICD-10-CM

## 2019-09-05 NOTE — TELEPHONE ENCOUNTER
Call patient - Lan Guillen replied to my message that she would like to see patient, but would appreciate if we got an [address] first  I will put an order in for that and then after she schedules the US, she can call Dr Wilder Ax office for an appt  Thanks     ----- Message from Melissa Edge MD sent at 9/5/2019  8:06 AM EDT -----  Regarding: FW: ovarian cyst  Hello Dr Luis Alberto Garcia ,  I am more than happy to see her  It would be most helpful to have an US ordered   ----- Message -----  From: Courtney Hamilton DO  Sent: 9/4/2019   7:00 PM EDT  To: Melissa Edge MD  Subject: ovarian cyst                                     Dr Lan Matamoros,   I had ordered a CT of the abdomen and pelvis on Kathy because she was having symptoms of  urinary pressure, left-sided pain and microscopic hematuria  The CT did not show any abnormality of her kidneys, ureter or bladder but did show a left ovarian cyst   I was not sure if I should just have patient make an appointment or if he would want an ultrasound?

## 2019-09-17 ENCOUNTER — HOSPITAL ENCOUNTER (OUTPATIENT)
Dept: ULTRASOUND IMAGING | Facility: MEDICAL CENTER | Age: 49
Discharge: HOME | End: 2019-09-17
Payer: COMMERCIAL

## 2019-09-17 DIAGNOSIS — N83.202 LEFT OVARIAN CYST: ICD-10-CM

## 2019-09-17 PROCEDURE — 76830 TRANSVAGINAL US NON-OB: CPT

## 2019-09-17 PROCEDURE — 76856 US EXAM PELVIC COMPLETE: CPT

## 2019-10-03 ENCOUNTER — ANNUAL EXAM (OUTPATIENT)
Dept: OBGYN CLINIC | Facility: MEDICAL CENTER | Age: 49
End: 2019-10-03
Payer: COMMERCIAL

## 2019-10-03 VITALS
SYSTOLIC BLOOD PRESSURE: 116 MMHG | HEIGHT: 64 IN | DIASTOLIC BLOOD PRESSURE: 82 MMHG | BODY MASS INDEX: 32.47 KG/M2 | WEIGHT: 190.2 LBS

## 2019-10-03 DIAGNOSIS — Z12.31 ENCOUNTER FOR SCREENING MAMMOGRAM FOR MALIGNANT NEOPLASM OF BREAST: Primary | ICD-10-CM

## 2019-10-03 DIAGNOSIS — Z01.419 ENCOUNTER FOR GYNECOLOGICAL EXAMINATION: ICD-10-CM

## 2019-10-03 PROCEDURE — 99396 PREV VISIT EST AGE 40-64: CPT | Performed by: OBSTETRICS & GYNECOLOGY

## 2019-10-03 NOTE — PROGRESS NOTES
ASSESSMENT & PLAN: Sueellen Mcardle is a 54 y o  Zakiya Dyer with normal gynecologic exam     1   Routine well woman exam done today  2  Pap and HPV:  The patient's last pap and hpv was   It was normal     Pap and cotesting was not done today  Current ASCCP Guidelines reviewed  - next year needs one   3  Mammogram ordered  4  The following were reviewed in today's visit: breast self exam, mammography screening ordered, menopause, exercise and healthy diet      CC:  Annual Gynecologic Examination    HPI: Sueellen Mcardle is a 54 y o  Zakiya Dyer who presents for annual gynecologic examination  She has the following concerns:  S/p supracervical hysterectomy     Health Maintenance:    She wears her seatbelt routinely  She does perform regular monthly self breast exams  She feels safe at home       Patient Active Problem List   Diagnosis    Acid reflux disease    Chronic right-sided thoracic back pain    Enlarged uterus    Hot flashes    Fibroids, intramural    Irregular menses    Uterine leiomyoma       Past Medical History:   Diagnosis Date    Fibroid     uterine    GERD (gastroesophageal reflux disease)     resolved on way    Hot flashes     at hs    Irregular menses     heavy bleeding, painful    Right flank pain     [id number] RESOLVED    Wears contact lenses     and glasses       Past Surgical History:   Procedure Laterality Date    COLONOSCOPY      CYSTOSCOPY N/A 2018    Procedure: CYSTOSCOPY;  Surgeon: Nela Galarza DO;  [facility];  Service: Gynecology    OR LAP, SUPRACERVIAL HYSTERECTOMY W/ TUBE&OV, <250G Bilateral 2018    Procedure: HYSTERECTOMY LAPAROSCOPIC SUPRACERVICAL ([address]) AND REMOVAL OF BOTH TUBES;  Surgeon: Nela Galarza DO;  [facility];  Service: Gynecology    VAGINAL DELIVERY      WISDOM TOOTH EXTRACTION         Past OB/Gyn History:  OB History        1    Para   1    Term   1            AB        Living   1       SAB TAB        Ectopic        Multiple        Live Births   1                  Pt does not have menstrual issues  =   History of sexually transmitted infection: No   History of abnormal pap smears: No      Patient is currently sexually active  heterosexual  The current method of family planning is status post hysterectomy      Family History   Problem Relation Age of Onset    Breast cancer Mother     Ovarian cancer Maternal Grandmother     Colon cancer Maternal Grandfather     Lung cancer Paternal Grandmother     Breast cancer Maternal Aunt        Social History:  Social History     Socioeconomic History    Marital status:      Spouse name: Not on file    Number of children: Not on file    Years of education: Not on file    Highest education level: Not on file   Occupational History    Not on file   Social Needs    Financial resource strain: Not on file    Food insecurity:     Worry: Not on file     Inability: Not on file    Transportation needs:     Medical: Not on file     Non-medical: Not on file   Tobacco Use    Smoking status: Never Smoker    Smokeless tobacco: Never Used   Substance and Sexual Activity    Alcohol use: Yes     Comment: occa, liquor drink 4 a week    Drug use: No    Sexual activity: Yes     Partners: Male     Birth control/protection: Female Sterilization   Lifestyle    Physical activity:     Days per week: Not on file     Minutes per session: Not on file    Stress: Not on file   Relationships    Social connections:     Talks on phone: Not on file     Gets together: Not on file     Attends Anabaptist service: Not on file     Active member of club or organization: Not on file     Attends meetings of clubs or organizations: Not on file     Relationship status: Not on file    Intimate partner violence:     Fear of current or ex partner: Not on file     Emotionally abused: Not on file     Physically abused: Not on file     Forced sexual activity: Not on file   Other Topics Concern    Not on file   Social History Narrative    ALWAYS USES SEAT BELT    DAILY CAFFEINE CONSUMPTION 1 SERVING A DAY    FEELS SAFE AT HOME       No Known Allergies    Current Outpatient Medications:     ibuprofen (MOTRIN) 200 mg tablet, Take 400 mg by mouth every 6 (six) hours as needed for mild pain, Disp: , Rfl:     Review of Systems:    Review of Systems  Constitutional :no fever, feels well, no tiredness, no recent weight gain or loss  ENT: no ear ache, no loss of hearing, no nosebleeds or nasal discharge, no sore throat or hoarseness  Cardiovascular: no complaints of slow or fast heart beat, no chest pain, no palpitations, no leg claudication or lower extremity edema  Respiratory: no complaints of shortness of shortness of breath, no GIBSON  Breasts:no complaints of breast pain, breast lump, or nipple discharge  Gastrointestinal: no complaints of abdominal pain, constipation, nausea, vomiting, or diarrhea or bloody stools  Genitourinary : no complaints of dysuria, incontinence, pelvic pain, no dysmenorrhea, vaginal discharge or abnormal vaginal bleeding and as noted in HPI  Musculoskeletal: no complaints of arthralgia, no myalgia, no joint swelling or stiffness, no limb pain or swelling  Integumentary: no complaints of skin rash or lesion, itching or dry skin  Neurological: no complaints of headache, no confusion, no numbness or tingling, no dizziness or fainting    Objective      /82   Ht 5' 4" (1 626 m)   Wt 86 3 kg (190 lb 3 2 oz)   LMP 11/14/2018 (Approximate) Comment: Pt states it may have been the first two weeks in november  BMI 32 65 kg/m²     General:   appears stated age, cooperative, alert normal mood and affect   Breasts: normal appearance, no masses or tenderness   Abdomen: soft, non-tender, without masses or organomegaly   Vulva: normal   Vagina: normal vagina, no discharge, exudate, lesion, or erythema   Urethra: normal   Cervix: Normal, no discharge  Nontender  Uterus: uterus absent   Adnexa: no mass, fullness, tenderness   Skin normal skin turgor and no rashes     Psychiatric orientation to person, place, and time: normal  mood and affect: normal

## 2019-12-10 ENCOUNTER — HOSPITAL ENCOUNTER (OUTPATIENT)
Dept: MAMMOGRAPHY | Facility: MEDICAL CENTER | Age: 49
Discharge: HOME | End: 2019-12-10
Payer: COMMERCIAL

## 2019-12-10 VITALS — BODY MASS INDEX: 32.44 KG/M2 | HEIGHT: 64 IN | WEIGHT: 190 LBS

## 2019-12-10 DIAGNOSIS — Z12.31 ENCOUNTER FOR SCREENING MAMMOGRAM FOR MALIGNANT NEOPLASM OF BREAST: ICD-10-CM

## 2019-12-10 PROCEDURE — 77063 BREAST TOMOSYNTHESIS BI: CPT

## 2019-12-10 PROCEDURE — 77067 SCR MAMMO BI INCL CAD: CPT

## 2019-12-12 NOTE — RESULT ENCOUNTER NOTE
Please inform patient of stable area of assymetry , recommendation is repeat mammo in one year thanks

## 2020-10-13 ENCOUNTER — ANNUAL EXAM (OUTPATIENT)
Dept: OBGYN CLINIC | Facility: MEDICAL CENTER | Age: 50
End: 2020-10-13
Payer: COMMERCIAL

## 2020-10-13 VITALS — BODY MASS INDEX: 33.18 KG/M2 | DIASTOLIC BLOOD PRESSURE: 80 MMHG | SYSTOLIC BLOOD PRESSURE: 115 MMHG | WEIGHT: 193.3 LBS

## 2020-10-13 DIAGNOSIS — Z12.31 ENCOUNTER FOR SCREENING MAMMOGRAM FOR MALIGNANT NEOPLASM OF BREAST: ICD-10-CM

## 2020-10-13 DIAGNOSIS — Z01.419 ENCOUNTER FOR GYNECOLOGICAL EXAMINATION WITH PAPANICOLAOU SMEAR OF CERVIX: Primary | ICD-10-CM

## 2020-10-13 PROCEDURE — 99396 PREV VISIT EST AGE 40-64: CPT | Performed by: OBSTETRICS & GYNECOLOGY

## 2020-10-13 PROCEDURE — 1036F TOBACCO NON-USER: CPT | Performed by: OBSTETRICS & GYNECOLOGY

## 2020-10-15 LAB
CLINICAL INFO: ABNORMAL
CYTO CVX: ABNORMAL
CYTOLOGY CMNT CVX/VAG CYTO-IMP: ABNORMAL
DATE PREVIOUS BX: ABNORMAL
GEN CATEG CVX/VAG CYTO-IMP: ABNORMAL
HPV E6+E7 MRNA CVX QL NAA+PROBE: NOT DETECTED
LMP START DATE: ABNORMAL
SL AMB PREV. PAP:: ABNORMAL
SPECIMEN SOURCE CVX/VAG CYTO: ABNORMAL

## 2020-11-02 ENCOUNTER — TELEPHONE (OUTPATIENT)
Dept: OBGYN CLINIC | Facility: MEDICAL CENTER | Age: 50
End: 2020-11-02

## 2021-01-21 ENCOUNTER — HOSPITAL ENCOUNTER (OUTPATIENT)
Dept: MAMMOGRAPHY | Facility: MEDICAL CENTER | Age: 51
Discharge: HOME | End: 2021-01-21
Payer: COMMERCIAL

## 2021-01-21 VITALS — HEIGHT: 64 IN | BODY MASS INDEX: 32.95 KG/M2 | WEIGHT: 193 LBS

## 2021-01-21 DIAGNOSIS — Z12.31 ENCOUNTER FOR SCREENING MAMMOGRAM FOR MALIGNANT NEOPLASM OF BREAST: ICD-10-CM

## 2021-01-21 PROCEDURE — 77067 SCR MAMMO BI INCL CAD: CPT

## 2021-01-21 PROCEDURE — 77063 BREAST TOMOSYNTHESIS BI: CPT

## 2021-10-14 ENCOUNTER — ANNUAL EXAM (OUTPATIENT)
Dept: OBGYN CLINIC | Facility: MEDICAL CENTER | Age: 51
End: 2021-10-14
Payer: COMMERCIAL

## 2021-10-14 VITALS
HEIGHT: 64 IN | DIASTOLIC BLOOD PRESSURE: 80 MMHG | BODY MASS INDEX: 33.63 KG/M2 | WEIGHT: 197 LBS | SYSTOLIC BLOOD PRESSURE: 120 MMHG

## 2021-10-14 DIAGNOSIS — Z12.31 ENCOUNTER FOR SCREENING MAMMOGRAM FOR MALIGNANT NEOPLASM OF BREAST: ICD-10-CM

## 2021-10-14 DIAGNOSIS — Z01.419 WOMEN'S ANNUAL ROUTINE GYNECOLOGICAL EXAMINATION: Primary | ICD-10-CM

## 2021-10-14 PROCEDURE — G0145 SCR C/V CYTO,THINLAYER,RESCR: HCPCS | Performed by: STUDENT IN AN ORGANIZED HEALTH CARE EDUCATION/TRAINING PROGRAM

## 2021-10-14 PROCEDURE — 99396 PREV VISIT EST AGE 40-64: CPT | Performed by: STUDENT IN AN ORGANIZED HEALTH CARE EDUCATION/TRAINING PROGRAM

## 2021-10-14 PROCEDURE — G0476 HPV COMBO ASSAY CA SCREEN: HCPCS | Performed by: STUDENT IN AN ORGANIZED HEALTH CARE EDUCATION/TRAINING PROGRAM

## 2021-10-15 LAB
HPV HR 12 DNA CVX QL NAA+PROBE: NEGATIVE
HPV16 DNA CVX QL NAA+PROBE: NEGATIVE
HPV18 DNA CVX QL NAA+PROBE: NEGATIVE

## 2021-10-21 LAB
LAB AP GYN PRIMARY INTERPRETATION: NORMAL
Lab: NORMAL

## 2022-01-22 ENCOUNTER — HOSPITAL ENCOUNTER (OUTPATIENT)
Dept: MAMMOGRAPHY | Facility: MEDICAL CENTER | Age: 52
Discharge: HOME | End: 2022-01-22
Payer: COMMERCIAL

## 2022-01-22 VITALS — HEIGHT: 64 IN | BODY MASS INDEX: 33.63 KG/M2 | WEIGHT: 197 LBS

## 2022-01-22 DIAGNOSIS — Z12.31 ENCOUNTER FOR SCREENING MAMMOGRAM FOR MALIGNANT NEOPLASM OF BREAST: ICD-10-CM

## 2022-01-22 PROCEDURE — 77067 SCR MAMMO BI INCL CAD: CPT

## 2022-01-22 PROCEDURE — 77063 BREAST TOMOSYNTHESIS BI: CPT

## 2022-03-31 ENCOUNTER — OFFICE VISIT (OUTPATIENT)
Dept: FAMILY MEDICINE CLINIC | Facility: CLINIC | Age: 52
End: 2022-03-31
Payer: COMMERCIAL

## 2022-03-31 VITALS
WEIGHT: 192.8 LBS | HEART RATE: 77 BPM | BODY MASS INDEX: 34.16 KG/M2 | SYSTOLIC BLOOD PRESSURE: 128 MMHG | DIASTOLIC BLOOD PRESSURE: 78 MMHG | HEIGHT: 63 IN | TEMPERATURE: 98.1 F

## 2022-03-31 DIAGNOSIS — Z13.220 ENCOUNTER FOR LIPID SCREENING FOR CARDIOVASCULAR DISEASE: ICD-10-CM

## 2022-03-31 DIAGNOSIS — Z13.6 ENCOUNTER FOR LIPID SCREENING FOR CARDIOVASCULAR DISEASE: ICD-10-CM

## 2022-03-31 DIAGNOSIS — E55.9 VITAMIN D DEFICIENCY: ICD-10-CM

## 2022-03-31 DIAGNOSIS — Z13.1 DIABETES MELLITUS SCREENING: ICD-10-CM

## 2022-03-31 DIAGNOSIS — L65.9 HAIR LOSS: ICD-10-CM

## 2022-03-31 DIAGNOSIS — Z00.00 ANNUAL PHYSICAL EXAM: Primary | ICD-10-CM

## 2022-03-31 PROBLEM — N92.6 IRREGULAR MENSES: Status: RESOLVED | Noted: 2018-10-24 | Resolved: 2022-03-31

## 2022-03-31 PROBLEM — N85.2 ENLARGED UTERUS: Status: RESOLVED | Noted: 2017-09-25 | Resolved: 2022-03-31

## 2022-03-31 PROCEDURE — 99396 PREV VISIT EST AGE 40-64: CPT | Performed by: FAMILY MEDICINE

## 2022-03-31 NOTE — PATIENT INSTRUCTIONS

## 2022-03-31 NOTE — PROGRESS NOTES
ADULT ANNUAL [address] [facility]    NAME: Kristina Richey  AGE: 54 y o  SEX: female  : 1970     DATE: 2022     Assessment and Plan:     Problem List Items Addressed This Visit     None      Visit Diagnoses     Annual physical exam    -  Primary    Relevant Orders    Lipid Panel with Direct LDL reflex (Completed)    Comprehensive metabolic panel (Completed)    TSH, 3rd generation with Free T4 reflex (Completed)    HEMOGLOBIN A1C W/ EAG ESTIMATION (Completed)    CBC and differential (Completed)    Hair loss        Relevant Orders    TSH, 3rd generation with Free T4 reflex (Completed)    CBC and differential (Completed)    Iron (Completed)    Vitamin D 25 hydroxy (Completed)    Encounter for lipid screening for cardiovascular disease        Relevant Orders    Lipid Panel with Direct LDL reflex (Completed)    HEMOGLOBIN A1C W/ EAG ESTIMATION (Completed)    Diabetes mellitus screening        Relevant Orders    Comprehensive metabolic panel (Completed)          Immunizations and preventive care screenings were discussed with patient today  Appropriate education was printed on patient's after visit summary  Counseling:  Alcohol/drug use: discussed moderation in alcohol intake, the recommendations for healthy alcohol use, and avoidance of illicit drug use  Dental Health: discussed importance of regular tooth brushing, flossing, and dental visits  · Exercise: the importance of regular exercise/physical activity was discussed  Recommend exercise 3-5 times per week for at least 30 minutes  No follow-ups on file       Chief Complaint:     Chief Complaint   Patient presents with    Physical Exam     Physical exam    Alopecia     Patient notices hair loss in past 3 weeks, excessive amounts      History of Present Illness:     Adult Annual Physical   Patient here for a comprehensive physical exam  The patient reports problems - hair loss Had COVID in December - had high fever  Diet and Physical Activity  · Diet/Nutrition: well balanced diet  · Exercise: no formal exercise  Depression Screening  PHQ-2/9 Depression Screening    Little interest or pleasure in doing things: 0 - not at all  Feeling down, depressed, or hopeless: 0 - not at all  PHQ-2 Score: 0  PHQ-2 Interpretation: Negative depression screen       General Health  · Sleep: sleeps well  · Hearing: normal - bilateral   · Vision: no vision problems  · Dental: regular dental visits  /GYN Health  · Patient is: postmenopausal  · Last menstrual period: 2018 supra cervical hysterectomy and tubes removed  Review of Systems:     Review of Systems   Constitutional: Negative for chills and fever  HENT: Negative for congestion and sore throat  Respiratory: Negative for chest tightness  Cardiovascular: Negative for chest pain and palpitations  Gastrointestinal: Negative for abdominal pain, constipation, diarrhea and nausea  Genitourinary: Negative for difficulty urinating  Skin: Negative  Neurological: Negative for dizziness and headaches  Psychiatric/Behavioral: Negative         Past Medical History:     Past Medical History:   Diagnosis Date    Enlarged uterus 9/25/2017    Fibroid     uterine    GERD (gastroesophageal reflux disease)     resolved on way    Hot flashes     at hs    Irregular menses     heavy bleeding, painful    Right flank pain     [id number] RESOLVED    Uterine leiomyoma 11/23/2018    Added automatically from request for surgery [id number]    Wears contact lenses     and glasses      Past Surgical History:     Past Surgical History:   Procedure Laterality Date    COLONOSCOPY      CYSTOSCOPY N/A 12/4/2018    Procedure: CYSTOSCOPY;  Surgeon: Jose Wheeler DO;  [facility];  Service: Gynecology    HYSTERECTOMY  2017    [place] LAP, SUPRACERVIAL HYSTERECTOMY W/ TUBE&OV, <250G Bilateral 12/4/2018    Procedure: HYSTERECTOMY LAPAROSCOPIC SUPRACERVICAL [facility]) AND REMOVAL OF BOTH TUBES;  Surgeon: Eric Davis DO;  [facility];  Service: Gynecology    VAGINAL DELIVERY      WISDOM TOOTH EXTRACTION     Lasik surgery two years ago  Social History:     Social History     Socioeconomic History    Marital status:      Spouse name: None    Number of children: None    Years of education: None    Highest education level: None   Occupational History    None   Tobacco Use    Smoking status: Never Smoker    Smokeless tobacco: Never Used   Substance and Sexual Activity    Alcohol use: Yes     Comment: occa, liquor drink 4 a week    Drug use: No    Sexual activity: Yes     Partners: Male     Birth control/protection: Female Sterilization   Other Topics Concern    None   Social History Narrative    ALWAYS USES SEAT BELT    DAILY CAFFEINE CONSUMPTION 1 SERVING A DAY    FEELS SAFE AT HOME     Social Determinants of Health     Financial Resource Strain: Not on file   Food Insecurity: Not on file   Transportation Needs: Not on file   Physical Activity: Not on file   Stress: Not on file   Social Connections: Not on file   Intimate Partner Violence: Not on file   Housing Stability: Not on file      Family History:     Family History   Problem Relation Age of Onset    Breast cancer Mother 76    No Known Problems Father     Ovarian cancer Maternal Grandmother 80    Colon cancer Maternal Grandfather 80    Lung cancer Paternal Grandmother 80    No Known Problems Maternal Aunt     No Known Problems Daughter     Breast cancer Maternal Aunt 36    No Known Problems Paternal Aunt     No Known Problems Paternal Grandfather       Current Medications:     Current Outpatient Medications   Medication Sig Dispense Refill    ibuprofen (MOTRIN) 200 mg tablet Take 400 mg by mouth every 6 (six) hours as needed for mild pain       No current facility-administered medications for this visit        Allergies:     No Known Allergies Physical Exam:     /78 (BP Location: Left arm, Patient Position: Sitting, Cuff Size: Adult)   Pulse 77   Temp 98 1 °F (36 7 °C)   Ht 5' 3 25" (1 607 m)   Wt 87 5 kg (192 lb 12 8 oz)   LMP 11/14/2018 (Approximate) Comment: Pt states it may have been the first two weeks in november  PF 97 L/min   BMI 33 88 kg/m²     Physical Exam  Vitals and nursing note reviewed  Constitutional:       General: She is not in acute distress  Appearance: She is well-developed  HENT:      Head: Normocephalic and atraumatic  Eyes:      Conjunctiva/sclera: Conjunctivae normal    Cardiovascular:      Rate and Rhythm: Normal rate and regular rhythm  Heart sounds: No murmur heard  Pulmonary:      Effort: Pulmonary effort is normal  No respiratory distress  Breath sounds: Normal breath sounds  Abdominal:      Palpations: Abdomen is soft  Tenderness: There is no abdominal tenderness  Musculoskeletal:      Cervical back: Neck supple  Skin:     General: Skin is warm and dry  Neurological:      Mental Status: She is alert            Phong Av, DO  [facility] [address]

## 2022-04-12 ENCOUNTER — APPOINTMENT (OUTPATIENT)
Dept: LAB | Facility: CLINIC | Age: 52
End: 2022-04-12
Payer: COMMERCIAL

## 2022-04-12 DIAGNOSIS — L65.9 HAIR LOSS: ICD-10-CM

## 2022-04-12 DIAGNOSIS — Z00.00 ANNUAL PHYSICAL EXAM: ICD-10-CM

## 2022-04-12 DIAGNOSIS — Z13.220 ENCOUNTER FOR LIPID SCREENING FOR CARDIOVASCULAR DISEASE: ICD-10-CM

## 2022-04-12 DIAGNOSIS — Z13.1 DIABETES MELLITUS SCREENING: ICD-10-CM

## 2022-04-12 DIAGNOSIS — Z13.6 ENCOUNTER FOR LIPID SCREENING FOR CARDIOVASCULAR DISEASE: ICD-10-CM

## 2022-04-12 LAB
25(OH)D3 SERPL-MCNC: 27.3 NG/ML (ref 30–100)
ALBUMIN SERPL BCP-MCNC: 3.3 G/DL (ref 3.5–5)
ALP SERPL-CCNC: 73 U/L (ref 46–116)
ALT SERPL W P-5'-P-CCNC: 24 U/L (ref 12–78)
ANION GAP SERPL CALCULATED.3IONS-SCNC: 1 MMOL/L (ref 4–13)
AST SERPL W P-5'-P-CCNC: 16 U/L (ref 5–45)
BASOPHILS # BLD AUTO: 0.03 THOUSANDS/ΜL (ref 0–0.1)
BASOPHILS NFR BLD AUTO: 0 % (ref 0–1)
BILIRUB SERPL-MCNC: 0.4 MG/DL (ref 0.2–1)
BUN SERPL-MCNC: 13 MG/DL (ref 5–25)
CALCIUM ALBUM COR SERPL-MCNC: 10.1 MG/DL (ref 8.3–10.1)
CALCIUM SERPL-MCNC: 9.5 MG/DL (ref 8.3–10.1)
CHLORIDE SERPL-SCNC: 107 MMOL/L (ref 100–108)
CHOLEST SERPL-MCNC: 207 MG/DL
CO2 SERPL-SCNC: 30 MMOL/L (ref 21–32)
CREAT SERPL-MCNC: 0.86 MG/DL (ref 0.6–1.3)
EOSINOPHIL # BLD AUTO: 0.26 THOUSAND/ΜL (ref 0–0.61)
EOSINOPHIL NFR BLD AUTO: 3 % (ref 0–6)
ERYTHROCYTE [DISTWIDTH] IN BLOOD BY AUTOMATED COUNT: 14.4 % (ref 11.6–15.1)
GFR SERPL CREATININE-BSD FRML MDRD: 79 ML/MIN/1.73SQ M
GLUCOSE P FAST SERPL-MCNC: 99 MG/DL (ref 65–99)
HCT VFR BLD AUTO: 40.9 % (ref 34.8–46.1)
HDLC SERPL-MCNC: 60 MG/DL
HGB BLD-MCNC: 13.2 G/DL (ref 11.5–15.4)
IMM GRANULOCYTES # BLD AUTO: 0.03 THOUSAND/UL (ref 0–0.2)
IMM GRANULOCYTES NFR BLD AUTO: 0 % (ref 0–2)
IRON SERPL-MCNC: 59 UG/DL (ref 50–170)
LDLC SERPL CALC-MCNC: 124 MG/DL (ref 0–100)
LYMPHOCYTES # BLD AUTO: 2.63 THOUSANDS/ΜL (ref 0.6–4.47)
LYMPHOCYTES NFR BLD AUTO: 32 % (ref 14–44)
MCH RBC QN AUTO: 31 PG (ref 26.8–34.3)
MCHC RBC AUTO-ENTMCNC: 32.3 G/DL (ref 31.4–37.4)
MCV RBC AUTO: 96 FL (ref 82–98)
MONOCYTES # BLD AUTO: 0.81 THOUSAND/ΜL (ref 0.17–1.22)
MONOCYTES NFR BLD AUTO: 10 % (ref 4–12)
NEUTROPHILS # BLD AUTO: 4.55 THOUSANDS/ΜL (ref 1.85–7.62)
NEUTS SEG NFR BLD AUTO: 55 % (ref 43–75)
NRBC BLD AUTO-RTO: 0 /100 WBCS
PLATELET # BLD AUTO: 263 THOUSANDS/UL (ref 149–390)
PMV BLD AUTO: 10.4 FL (ref 8.9–12.7)
POTASSIUM SERPL-SCNC: 4.1 MMOL/L (ref 3.5–5.3)
PROT SERPL-MCNC: 6.9 G/DL (ref 6.4–8.2)
RBC # BLD AUTO: 4.26 MILLION/UL (ref 3.81–5.12)
SODIUM SERPL-SCNC: 138 MMOL/L (ref 136–145)
T4 FREE SERPL-MCNC: 0.99 NG/DL (ref 0.76–1.46)
TRIGL SERPL-MCNC: 115 MG/DL
TSH SERPL DL<=0.05 MIU/L-ACNC: 5.92 UIU/ML (ref 0.45–4.5)
WBC # BLD AUTO: 8.31 THOUSAND/UL (ref 4.31–10.16)

## 2022-04-12 PROCEDURE — 36415 COLL VENOUS BLD VENIPUNCTURE: CPT

## 2022-04-12 PROCEDURE — 84439 ASSAY OF FREE THYROXINE: CPT

## 2022-04-12 PROCEDURE — 84443 ASSAY THYROID STIM HORMONE: CPT

## 2022-04-12 PROCEDURE — 82306 VITAMIN D 25 HYDROXY: CPT

## 2022-04-12 PROCEDURE — 83540 ASSAY OF IRON: CPT

## 2022-04-12 PROCEDURE — 80053 COMPREHEN METABOLIC PANEL: CPT

## 2022-04-12 PROCEDURE — 83036 HEMOGLOBIN GLYCOSYLATED A1C: CPT

## 2022-04-12 PROCEDURE — 85025 COMPLETE CBC W/AUTO DIFF WBC: CPT

## 2022-04-12 PROCEDURE — 80061 LIPID PANEL: CPT

## 2022-04-13 LAB
EST. AVERAGE GLUCOSE BLD GHB EST-MCNC: 114 MG/DL
HBA1C MFR BLD: 5.6 %

## 2022-04-26 ENCOUNTER — TELEPHONE (OUTPATIENT)
Dept: FAMILY MEDICINE CLINIC | Facility: CLINIC | Age: 52
End: 2022-04-26

## 2022-04-29 NOTE — TELEPHONE ENCOUNTER
I tried to call patient because I wanted to discuss her thyroid blood work  I will try to reach her on Monday  I will also send her an e-mail

## 2022-06-09 ENCOUNTER — TELEPHONE (OUTPATIENT)
Dept: FAMILY MEDICINE CLINIC | Facility: CLINIC | Age: 52
End: 2022-06-09

## 2022-06-09 NOTE — TELEPHONE ENCOUNTER
Dr Finch Every, We received a request from the billing department  You ordered a Vit D blood test and used L65 9 Nonscarring hair loss, unspecified  Patients insurance will not cover with this diagnosis  I am putting a list on your desk  As this is for [facility] you will need to go back to your visit of 3/31/22 and addend the order for the blood work  If you need any help, please let me know   Thanks, Zhane

## 2022-06-14 PROBLEM — E55.9 VITAMIN D DEFICIENCY: Status: ACTIVE | Noted: 2022-06-14

## 2022-06-14 NOTE — TELEPHONE ENCOUNTER
Ros Lai,  She does have Vit D deficiency  I put that in her problem list - must I go back to when I placed the order and change the diagnosis code also

## 2022-08-01 ENCOUNTER — APPOINTMENT (OUTPATIENT)
Dept: LAB | Facility: CLINIC | Age: 52
End: 2022-08-01
Payer: COMMERCIAL

## 2022-08-01 DIAGNOSIS — E03.9 ACQUIRED HYPOTHYROIDISM: ICD-10-CM

## 2022-08-01 LAB — TSH SERPL DL<=0.05 MIU/L-ACNC: 1.8 UIU/ML (ref 0.45–4.5)

## 2022-08-01 PROCEDURE — 36415 COLL VENOUS BLD VENIPUNCTURE: CPT

## 2022-08-01 PROCEDURE — 84443 ASSAY THYROID STIM HORMONE: CPT

## 2022-08-02 DIAGNOSIS — E03.9 ACQUIRED HYPOTHYROIDISM: ICD-10-CM

## 2022-08-02 RX ORDER — LEVOTHYROXINE SODIUM 0.03 MG/1
25 TABLET ORAL DAILY
Qty: 90 TABLET | Refills: 1 | Status: SHIPPED | OUTPATIENT
Start: 2022-08-02 | End: 2023-01-24 | Stop reason: SDUPTHER

## 2022-09-21 ENCOUNTER — TELEPHONE (OUTPATIENT)
Dept: OBGYN CLINIC | Facility: MEDICAL CENTER | Age: 52
End: 2022-09-21

## 2022-10-20 ENCOUNTER — ANNUAL EXAM (OUTPATIENT)
Dept: OBGYN CLINIC | Facility: CLINIC | Age: 52
End: 2022-10-20
Payer: COMMERCIAL

## 2022-10-20 VITALS
SYSTOLIC BLOOD PRESSURE: 118 MMHG | BODY MASS INDEX: 34.52 KG/M2 | HEIGHT: 63 IN | WEIGHT: 194.8 LBS | DIASTOLIC BLOOD PRESSURE: 66 MMHG

## 2022-10-20 DIAGNOSIS — Z01.419 ENCOUNTER FOR GYNECOLOGICAL EXAMINATION: Primary | ICD-10-CM

## 2022-10-20 DIAGNOSIS — Z12.31 ENCOUNTER FOR SCREENING MAMMOGRAM FOR MALIGNANT NEOPLASM OF BREAST: ICD-10-CM

## 2022-10-20 PROCEDURE — 99396 PREV VISIT EST AGE 40-64: CPT | Performed by: OBSTETRICS & GYNECOLOGY

## 2022-10-20 NOTE — PROGRESS NOTES
ASSESSMENT & PLAN: Juan Brenner is a 50 y o  Donzetta Hams with normal gynecologic exam     1   Routine well woman exam done today  2  Pap and HPV:  The patient's last pap and hpv was   It was normal     Pap with cotesting was not done today  Current ASCCP Guidelines reviewed  - 2020 LSIL , 202 normal - per asccp next cotesting     3  Mammogram ordered  4  Colorectal cancer screening was not ordered  5  The following were reviewed in today's visit: breast self exam, mammography screening ordered, menopause, exercise and healthy diet      CC:  Annual Gynecologic Examination    HPI: Juan Brenner is a 50 y o  Donzetta Hams who presents for annual gynecologic examination  She has the following concerns:  None      Health Maintenance:    She wears her seatbelt routinely  She does perform regular monthly self breast exams  She feels safe at home       Past Medical History:   Diagnosis Date   • Enlarged uterus 2017   • Fibroid     uterine   • GERD (gastroesophageal reflux disease)     resolved on way   • Hot flashes     at hs   • Irregular menses     heavy bleeding, painful   • Right flank pain     2017 RESOLVED   • Uterine leiomyoma 2018    Added automatically from request for surgery [id number]   • Wears contact lenses     and glasses       Past Surgical History:   Procedure Laterality Date   • COLONOSCOPY     • CYSTOSCOPY N/A 2018    Procedure: CYSTOSCOPY;  Surgeon: Pauline Casiano DO;  Location: [facility];  Service: Gynecology   • HYSTERECTOMY     • AR LAP, SUPRACERVIAL HYSTERECTOMY W/ TUBE&OV, <250G Bilateral 2018    Procedure: HYSTERECTOMY LAPAROSCOPIC SUPRACERVICAL ([address]) AND REMOVAL OF BOTH TUBES;  Surgeon: Pauline Casiano DO;  Location: [facility];  Service: Gynecology   • VAGINAL DELIVERY     • WISDOM TOOTH EXTRACTION         Past OB/Gyn History:  OB History        1    Para   1    Term   1            AB        Living   1       SAB        IAB Ectopic        Multiple        Live Births   1                 Family History   Problem Relation Age of Onset   • Breast cancer Mother 76   • No Known Problems Father    • Ovarian cancer Maternal Grandmother 80   • Colon cancer Maternal Grandfather 80   • Lung cancer Paternal Grandmother 80   • No Known Problems Maternal Aunt    • No Known Problems Daughter    • Breast cancer Maternal Aunt 40   • No Known Problems Paternal Aunt    • No Known Problems Paternal Grandfather        Social History:  Social History     Socioeconomic History   • Marital status:      Spouse name: Not on file   • Number of children: Not on file   • Years of education: Not on file   • Highest education level: Not on file   Occupational History   • Not on file   Tobacco Use   • Smoking status: Never Smoker   • Smokeless tobacco: Never Used   Vaping Use   • Vaping Use: Never used   Substance and Sexual Activity   • Alcohol use: Yes     Comment: occa, liquor drink 4 a week   • Drug use: No   • Sexual activity: Yes     Partners: Male     Birth control/protection: Female Sterilization, Rhythm   Other Topics Concern   • Not on file   Social History Narrative    ALWAYS USES SEAT BELT    DAILY CAFFEINE CONSUMPTION 1 SERVING A DAY    FEELS SAFE AT HOME     Social Determinants of Health     Financial Resource Strain: Not on file   Food Insecurity: Not on file   Transportation Needs: Not on file   Physical Activity: Not on file   Stress: Not on file   Social Connections: Not on file   Intimate Partner Violence: Not on file   Housing Stability: Not on file       No Known Allergies    Current Outpatient Medications:   •  ibuprofen (MOTRIN) 200 mg tablet, Take 400 mg by mouth every 6 (six) hours as needed for mild pain, Disp: , Rfl:   •  levothyroxine (Synthroid) 25 mcg tablet, Take 1 tablet (25 mcg total) by mouth daily, Disp: 90 tablet, Rfl: 1      Review of Systems  Constitutional :no fever, feels well, no tiredness, no recent weight gain or loss  ENT: no ear ache, no loss of hearing, no nosebleeds or nasal discharge, no sore throat or hoarseness  Cardiovascular: no complaints of slow or fast heart beat, no chest pain, no palpitations, no leg claudication or lower extremity edema  Respiratory: no complaints of shortness of shortness of breath, no GIBSON  Breasts:no complaints of breast pain, breast lump, or nipple discharge  Gastrointestinal: no complaints of abdominal pain, constipation, nausea, vomiting, or diarrhea or bloody stools  Genitourinary : no complaints of dysuria, incontinence, pelvic pain, no dysmenorrhea, vaginal discharge or abnormal vaginal bleeding and as noted in HPI  Musculoskeletal: no complaints of arthralgia, no myalgia, no joint swelling or stiffness, no limb pain or swelling  Integumentary: no complaints of skin rash or lesion, itching or dry skin  Neurological: no complaints of headache, no confusion, no numbness or tingling, no dizziness or fainting    Objective      /66   Ht 5' 3 25" (1 607 m)   Wt 88 4 kg (194 lb 12 8 oz)   LMP 11/14/2018 (Approximate) Comment: Pt states it may have been the first two weeks in november  BMI 34 24 kg/m²     General:   appears stated age, cooperative, alert normal mood and affect   Lungs: Unlabored breathing     Abdomen: soft, non-tender, without masses or organomegaly   Vulva: normal   Vagina: normal vagina, no discharge, exudate, lesion, or erythema   Urethra: normal   Cervix: Normal, no discharge     Uterus: uterus absent   Adnexa: no mass, fullness, tenderness   Psychiatric orientation to person, place, and time: normal  mood and affect: normal

## 2023-01-23 ENCOUNTER — HOSPITAL ENCOUNTER (OUTPATIENT)
Dept: MAMMOGRAPHY | Facility: MEDICAL CENTER | Age: 53
Discharge: HOME | End: 2023-01-23

## 2023-01-23 VITALS — WEIGHT: 194 LBS | HEIGHT: 63 IN | BODY MASS INDEX: 34.38 KG/M2

## 2023-01-23 DIAGNOSIS — Z12.31 ENCOUNTER FOR SCREENING MAMMOGRAM FOR MALIGNANT NEOPLASM OF BREAST: ICD-10-CM

## 2023-01-24 DIAGNOSIS — E03.9 ACQUIRED HYPOTHYROIDISM: ICD-10-CM

## 2023-01-24 RX ORDER — LEVOTHYROXINE SODIUM 0.03 MG/1
25 TABLET ORAL DAILY
Qty: 90 TABLET | Refills: 1 | Status: SHIPPED | OUTPATIENT
Start: 2023-01-24

## 2023-07-17 DIAGNOSIS — E03.9 ACQUIRED HYPOTHYROIDISM: ICD-10-CM

## 2023-07-17 DIAGNOSIS — E55.9 VITAMIN D DEFICIENCY: Primary | ICD-10-CM

## 2023-07-17 DIAGNOSIS — Z13.6 ENCOUNTER FOR LIPID SCREENING FOR CARDIOVASCULAR DISEASE: ICD-10-CM

## 2023-07-17 DIAGNOSIS — Z13.220 ENCOUNTER FOR LIPID SCREENING FOR CARDIOVASCULAR DISEASE: ICD-10-CM

## 2023-07-17 DIAGNOSIS — Z13.1 DIABETES MELLITUS SCREENING: ICD-10-CM

## 2023-07-17 RX ORDER — LEVOTHYROXINE SODIUM 0.03 MG/1
25 TABLET ORAL DAILY
Qty: 90 TABLET | Refills: 0 | Status: SHIPPED | OUTPATIENT
Start: 2023-07-17

## 2023-08-03 ENCOUNTER — RA CDI HCC (OUTPATIENT)
Dept: OTHER | Facility: HOSPITAL | Age: 53
End: 2023-08-03

## 2023-08-03 NOTE — PROGRESS NOTES
[address] [facility] coding opportunities       Chart reviewed, no opportunity found: CHART REVIEWED, NO OPPORTUNITY FOUND        Patients Insurance        Commercial Insurance: Valerio Lira

## 2023-08-08 ENCOUNTER — APPOINTMENT (OUTPATIENT)
Dept: LAB | Facility: CLINIC | Age: 53
End: 2023-08-08
Payer: COMMERCIAL

## 2023-08-08 DIAGNOSIS — E55.9 VITAMIN D DEFICIENCY: ICD-10-CM

## 2023-08-08 DIAGNOSIS — Z13.1 DIABETES MELLITUS SCREENING: ICD-10-CM

## 2023-08-08 DIAGNOSIS — Z13.220 ENCOUNTER FOR LIPID SCREENING FOR CARDIOVASCULAR DISEASE: ICD-10-CM

## 2023-08-08 DIAGNOSIS — Z13.6 ENCOUNTER FOR LIPID SCREENING FOR CARDIOVASCULAR DISEASE: ICD-10-CM

## 2023-08-08 DIAGNOSIS — E03.9 ACQUIRED HYPOTHYROIDISM: ICD-10-CM

## 2023-08-08 LAB
25(OH)D3 SERPL-MCNC: 23.1 NG/ML (ref 30–100)
ALBUMIN SERPL BCP-MCNC: 3.3 G/DL (ref 3.5–5)
ALP SERPL-CCNC: 82 U/L (ref 46–116)
ALT SERPL W P-5'-P-CCNC: 31 U/L (ref 12–78)
ANION GAP SERPL CALCULATED.3IONS-SCNC: 5 MMOL/L
AST SERPL W P-5'-P-CCNC: 18 U/L (ref 5–45)
BASOPHILS # BLD AUTO: 0.03 THOUSANDS/ÂΜL (ref 0–0.1)
BASOPHILS NFR BLD AUTO: 0 % (ref 0–1)
BILIRUB SERPL-MCNC: 0.43 MG/DL (ref 0.2–1)
BUN SERPL-MCNC: 18 MG/DL (ref 5–25)
CALCIUM ALBUM COR SERPL-MCNC: 10.4 MG/DL (ref 8.3–10.1)
CALCIUM SERPL-MCNC: 9.8 MG/DL (ref 8.3–10.1)
CHLORIDE SERPL-SCNC: 109 MMOL/L (ref 96–108)
CHOLEST SERPL-MCNC: 123 MG/DL
CO2 SERPL-SCNC: 27 MMOL/L (ref 21–32)
CREAT SERPL-MCNC: 0.96 MG/DL (ref 0.6–1.3)
EOSINOPHIL # BLD AUTO: 0.22 THOUSAND/ÂΜL (ref 0–0.61)
EOSINOPHIL NFR BLD AUTO: 3 % (ref 0–6)
ERYTHROCYTE [DISTWIDTH] IN BLOOD BY AUTOMATED COUNT: 13.4 % (ref 11.6–15.1)
GFR SERPL CREATININE-BSD FRML MDRD: 69 ML/MIN/1.73SQ M
GLUCOSE P FAST SERPL-MCNC: 111 MG/DL (ref 65–99)
HCT VFR BLD AUTO: 40.4 % (ref 34.8–46.1)
HDLC SERPL-MCNC: 55 MG/DL
HGB BLD-MCNC: 13.4 G/DL (ref 11.5–15.4)
IMM GRANULOCYTES # BLD AUTO: 0.01 THOUSAND/UL (ref 0–0.2)
IMM GRANULOCYTES NFR BLD AUTO: 0 % (ref 0–2)
LDLC SERPL CALC-MCNC: 58 MG/DL (ref 0–100)
LYMPHOCYTES # BLD AUTO: 2.58 THOUSANDS/ÂΜL (ref 0.6–4.47)
LYMPHOCYTES NFR BLD AUTO: 34 % (ref 14–44)
MCH RBC QN AUTO: 32.1 PG (ref 26.8–34.3)
MCHC RBC AUTO-ENTMCNC: 33.2 G/DL (ref 31.4–37.4)
MCV RBC AUTO: 97 FL (ref 82–98)
MONOCYTES # BLD AUTO: 0.79 THOUSAND/ÂΜL (ref 0.17–1.22)
MONOCYTES NFR BLD AUTO: 11 % (ref 4–12)
NEUTROPHILS # BLD AUTO: 3.88 THOUSANDS/ÂΜL (ref 1.85–7.62)
NEUTS SEG NFR BLD AUTO: 52 % (ref 43–75)
NRBC BLD AUTO-RTO: 0 /100 WBCS
PLATELET # BLD AUTO: 278 THOUSANDS/UL (ref 149–390)
PMV BLD AUTO: 10.2 FL (ref 8.9–12.7)
POTASSIUM SERPL-SCNC: 4.6 MMOL/L (ref 3.5–5.3)
PROT SERPL-MCNC: 6.9 G/DL (ref 6.4–8.4)
RBC # BLD AUTO: 4.17 MILLION/UL (ref 3.81–5.12)
SODIUM SERPL-SCNC: 141 MMOL/L (ref 135–147)
T4 FREE SERPL-MCNC: 0.92 NG/DL (ref 0.61–1.12)
TRIGL SERPL-MCNC: 49 MG/DL
TSH SERPL DL<=0.05 MIU/L-ACNC: 5.25 UIU/ML (ref 0.45–4.5)
WBC # BLD AUTO: 7.51 THOUSAND/UL (ref 4.31–10.16)

## 2023-08-08 PROCEDURE — 85025 COMPLETE CBC W/AUTO DIFF WBC: CPT

## 2023-08-08 PROCEDURE — 80061 LIPID PANEL: CPT

## 2023-08-08 PROCEDURE — 36415 COLL VENOUS BLD VENIPUNCTURE: CPT

## 2023-08-08 PROCEDURE — 84443 ASSAY THYROID STIM HORMONE: CPT

## 2023-08-08 PROCEDURE — 82306 VITAMIN D 25 HYDROXY: CPT

## 2023-08-08 PROCEDURE — 80053 COMPREHEN METABOLIC PANEL: CPT

## 2023-08-08 PROCEDURE — 84439 ASSAY OF FREE THYROXINE: CPT

## 2023-08-10 ENCOUNTER — OFFICE VISIT (OUTPATIENT)
Dept: FAMILY MEDICINE CLINIC | Facility: CLINIC | Age: 53
End: 2023-08-10
Payer: COMMERCIAL

## 2023-08-10 VITALS
HEIGHT: 64 IN | SYSTOLIC BLOOD PRESSURE: 114 MMHG | BODY MASS INDEX: 33.19 KG/M2 | OXYGEN SATURATION: 98 % | WEIGHT: 194.4 LBS | TEMPERATURE: 98.5 F | DIASTOLIC BLOOD PRESSURE: 82 MMHG | HEART RATE: 80 BPM

## 2023-08-10 DIAGNOSIS — R73.9 ELEVATED BLOOD SUGAR: ICD-10-CM

## 2023-08-10 DIAGNOSIS — R23.2 HOT FLASHES: Primary | ICD-10-CM

## 2023-08-10 DIAGNOSIS — E55.9 VITAMIN D DEFICIENCY: ICD-10-CM

## 2023-08-10 DIAGNOSIS — F51.01 PRIMARY INSOMNIA: ICD-10-CM

## 2023-08-10 DIAGNOSIS — N62 LARGE BREASTS: ICD-10-CM

## 2023-08-10 DIAGNOSIS — E03.9 ACQUIRED HYPOTHYROIDISM: ICD-10-CM

## 2023-08-10 PROCEDURE — 99214 OFFICE O/P EST MOD 30 MIN: CPT | Performed by: FAMILY MEDICINE

## 2023-08-10 RX ORDER — ERGOCALCIFEROL 1.25 MG/1
50000 CAPSULE ORAL WEEKLY
Qty: 12 CAPSULE | Refills: 0 | Status: SHIPPED | OUTPATIENT
Start: 2023-08-10

## 2023-08-10 RX ORDER — LEVOTHYROXINE SODIUM 0.05 MG/1
50 TABLET ORAL DAILY
Qty: 90 TABLET | Refills: 1 | Status: SHIPPED | OUTPATIENT
Start: 2023-08-10

## 2023-08-10 NOTE — PATIENT INSTRUCTIONS
I am increasing Levothyroxine - 50 ug daily (can take two 25 ug until they are gone)  Take prescription Vit D 34032 units once a week for three months and then take 5000 IU of Vit D3 daily  Send me a message in about 3 to 4 weeks about how you are doing with Sertraline.

## 2023-08-10 NOTE — PROGRESS NOTES
Name: Logan Colbert      : 1970      MRN: [id number]  Encounter Provider: Debra Alonzo DO  Encounter Date: 8/10/2023   Encounter department: [address] [place] [address]   Patient is 70-year-old female seen for follow-up of hypothyroidism. She did have blood work and I am increasing her levothyroxine. We also discussed her vitamin D and blood sugar. Patient complains of hot flashes and I have prescribed sertraline. She can discuss hormone replacement with her GYN. I believe her  chronic neck and back pain are related to her large breasts pulling on the spine and also making it difficult for her to exercise. 1. Hot flashes  -     sertraline (Zoloft) 50 mg tablet; Take one half tablet daily x 1 week, then one daily    2. Vitamin D deficiency  -     ergocalciferol (VITAMIN D2) 50,000 units; Take 1 capsule (50,000 Units total) by mouth once a week    3. Acquired hypothyroidism  Assessment & Plan:  TSH is elevated - will increase levothyroixne    Orders:  -     levothyroxine (Synthroid) 50 mcg tablet; Take 1 tablet (50 mcg total) by mouth daily  -     TSH, 3rd generation with Free T4 reflex; Future; Expected date: 10/09/2023    4. Large breasts  -     Ambulatory Referral to Plastic Surgery; Future    5. Primary insomnia  -     sertraline (Zoloft) 50 mg tablet; Take one half tablet daily x 1 week, then one daily    6. Elevated blood sugar  -     HEMOGLOBIN A1C W/ EAG ESTIMATION; Future; Expected date: 10/09/2023         She will send me a message regarding how she is doing on the sertraline. And we can decide from there upon dosing. Subjective      Chief Complaint   Patient presents with   • Follow-up     Review recent labs from 23   • Nevus     Mole on back she would like looked at- no changes that she is aware of       Patient is here for follow up to labs and also having night sweats, not sleeping. Had hysterectomy about 4 years ago, but still has ovaries.   Feeling very lethargic. Has tried Melatonin. Patient with chronic back pain for years. Has been to physical therapy. Can't even stand at the sink for 10 minutes to do the dishes without getting back pain secondary to large breasts pulling on back. Also difficult to exercise. Review of Systems   Constitutional: Negative for chills and fever. HENT: Negative for congestion and sore throat. Respiratory: Negative for chest tightness. Cardiovascular: Negative for chest pain and palpitations. Gastrointestinal: Negative for abdominal pain, constipation, diarrhea and nausea. Endocrine: Positive for heat intolerance. Genitourinary: Negative for difficulty urinating. Musculoskeletal: Positive for back pain and neck pain. Skin: Negative. Neurological: Negative for dizziness and headaches. Psychiatric/Behavioral: Positive for sleep disturbance. The patient is nervous/anxious. Current Outpatient Medications on File Prior to Visit   Medication Sig   • ibuprofen (MOTRIN) 200 mg tablet Take 400 mg by mouth every 6 (six) hours as needed for mild pain       Objective     /82 (BP Location: Left arm, Patient Position: Sitting, Cuff Size: Large)   Pulse 80   Temp 98.5 °F (36.9 °C)   Ht 5' 4.25" (1.632 m)   Wt 88.2 kg (194 lb 6.4 oz)   LMP 11/14/2018 (Approximate) Comment: Pt states it may have been the first two weeks in november. SpO2 98%   BMI 33.11 kg/m²     Physical Exam  Vitals and nursing note reviewed. Constitutional:       General: She is not in acute distress. HENT:      Head: Normocephalic. Neck:      Thyroid: No thyromegaly. Cardiovascular:      Rate and Rhythm: Normal rate and regular rhythm. Heart sounds: Normal heart sounds. Pulmonary:      Effort: Pulmonary effort is normal.      Breath sounds: Normal breath sounds. Musculoskeletal:      Right lower leg: No edema. Left lower leg: No edema.       Comments: Muscle tension in upper thoracic and cervical spine muscles Lymphadenopathy:      Cervical: No cervical adenopathy. Skin:     General: Skin is warm and dry. Findings: Lesion (dark lesion in bra area on left back) present. Neurological:      Mental Status: She is alert and oriented to person, place, and time.    Psychiatric:         Mood and Affect: Mood normal.       Jeanne Goodson, DO

## 2023-08-11 ENCOUNTER — TELEPHONE (OUTPATIENT)
Dept: ADMINISTRATIVE | Facility: OTHER | Age: 53
End: 2023-08-11

## 2023-08-11 NOTE — TELEPHONE ENCOUNTER
Upon review of the In Basket request we were able to locate, review, and update the patient chart as requested for CRC: Colonoscopy. Any additional questions or concerns should be emailed to the Practice Liaisons via the appropriate education email address, please do not reply via In Basket.     Thank you  Concha Christiansen MA

## 2023-08-11 NOTE — TELEPHONE ENCOUNTER
----- Message from Suzi Zambrano MA sent at 8/10/2023  1:03 PM EDT -----  Regarding: Care Gap Request  08/10/23 1:03 PM    Hello, our patient Laxmi Castro has had CRC: Colonoscopy completed/performed. Please assist in updating the patient chart by pulling the Care Everywhere (CE) document. The date of service is 4/5/16.      Thank you,  Suzi Zambrano [facility]

## 2023-08-17 ENCOUNTER — TELEPHONE (OUTPATIENT)
Dept: PLASTIC SURGERY | Facility: CLINIC | Age: 53
End: 2023-08-17

## 2023-08-17 NOTE — TELEPHONE ENCOUNTER
Spoke to patient and reviewed criteria for breast reduction consultation. Emailed criteria and patient will let me know when 3 months of pt/chiro complete.

## 2023-08-18 DIAGNOSIS — M54.2 NECK PAIN: Primary | ICD-10-CM

## 2023-08-18 DIAGNOSIS — G89.29 CHRONIC MIDLINE THORACIC BACK PAIN: ICD-10-CM

## 2023-08-18 DIAGNOSIS — M54.6 CHRONIC MIDLINE THORACIC BACK PAIN: ICD-10-CM

## 2023-08-23 ENCOUNTER — EVALUATION (OUTPATIENT)
Dept: PHYSICAL THERAPY | Facility: CLINIC | Age: 53
End: 2023-08-23
Payer: COMMERCIAL

## 2023-08-23 DIAGNOSIS — G89.29 CHRONIC MIDLINE THORACIC BACK PAIN: ICD-10-CM

## 2023-08-23 DIAGNOSIS — M54.2 NECK PAIN: Primary | ICD-10-CM

## 2023-08-23 DIAGNOSIS — M54.6 CHRONIC MIDLINE THORACIC BACK PAIN: ICD-10-CM

## 2023-08-23 PROCEDURE — 97161 PT EVAL LOW COMPLEX 20 MIN: CPT | Performed by: PHYSICAL THERAPIST

## 2023-08-23 NOTE — PROGRESS NOTES
PT Evaluation     Today's date: 2023  Patient name: Alber Parnell  : 1970  MRN: [id number]  Referring provider: Humera Kong DO  Dx:   Encounter Diagnosis     ICD-10-CM    1. Neck pain  M54.2 Ambulatory Referral to Physical Therapy      2. Chronic midline thoracic back pain  M54.6 Ambulatory Referral to Physical Therapy    G89.29                      Assessment/Plan  Ms. Ziyad Aggarwal is a 50 y.o. female presenting to outpatient physical therapy with neck and upper back pain which restricts their ability to participate in ADLs, work, and recreational activities without pain. Functional limitations are result of the following impairments: decreased cervical ROM, decreased cervicothoracic mobility, decrease postural strength, decreased DNF endurance, headaches, and pain with function. Symptoms are consistent with pathoanatomical diagnosis is neck pain from postural deficit and decreased neuromuscular control. PMH is sig for hypothyroidism. No further referral appears necessary at this time based upon examination results    Patient was given the opportunity to ask questions, and all questions were answered to the patient's satisfaction. The patients's greatest concerns are the pain not getting better, fear of not being able to care for self or others, getting back to recreational activity or sport and fear of pain getting worse. Patient appears motivated, agrees with the POC, and is a good candidate for skilled physical therapy at this time. Patient was provided with handout of HEP consisting of cervical mobility, postural strengthening.      Symptom irritability: Low   Understanding of Dx/Px/POC: good  Prognosis: good  Negative prognostic indicators include: [place]    Goals  STG (3 weeks)  Pain: Patient will subjectively report maximum pain decreased by 2/10  Strength: Patient's will demonstrate scapular strength improved by 1/2 grade  ROM: Patient will increase thoracic mobility by 25%  Function: Patient increase score on FOTO by 10 points    LTG (6 weeks)  Pain: Patient will subjectively report less than 2/10 pain with functional activities. Strength: Patient's will demonstrate scapular strength improved to WNL  ROM: Patient will increase thoracic mobility by 50%. Function: Patient will increase score on FOTO to predicted value or better  Patient will be ind with HEP by [place] details: Prognosis above is given PT services 1-2x/week over the next 6 weeks and home program adherence. Patient would benefit from: skilled physical therapy, home exercise program  Referral necessary: no  Planned modality interventions: Hydrocollator packs, Cryotherapy, TENS and Low level laser  Planned therapy interventions: joint mobilization, manual therapy, massage, neuromuscular re-education, patient education, stretching, strengthening, therapeutic activities, therapeutic exercise, home exercise program, functional ROM exercises, gait training, flexibility, balance, abdominal trunk stabilization, motor coordination training, coordination and behavior modification  Frequency: 1-2x/week for 6 weeks  Duration in visits: 6-12  Plan of Care beginning date: 8/23/2023   Plan of Care expiration date: 10/3/2023  Treatment plan discussed with: patient    Subjective  IE (8/23/2023): Patient is a 50 y.o. female who presents for an initial outpatient physical therapy consultation regarding their neck and upper back pain. Patient reports that her pain has been on for years. She has tried PT in the past, as well as the chiropractor. Didn't help her neck pain. It is affecting her sleep, unable to get comfortable. She is considering a breast reduction to help with her symptoms. Aggravating factors: standing, walking for awhile, working.    Alleviating factors: heat, advil, neck stretches  Functional limitations: ADLs, work      Pain  Best: 1/10  Worst: 8/10  Irritability: minutes to hours  Location: occipital region, base of neck, shoulders  Quality: dull and achy  Progression: not changing    Social Support  Employment status: Sitting at a computer all day. Hobbies/Recreational Activities: paddleboarding, boating, motorcycle, wake boarding    Diagnostic Tests  Various x-rays throughout the years    Patient Goals for Therapy  "reduce the pain, reduce headache frequency"    Objective     Concurrent Complaints  Positive for headaches. Additional Special Questions  Occipital headaches    Postural Observations  Seated posture: fair  Standing posture: fair        Palpation   Left   Tenderness of the levator scapulae, scalenes, suboccipitals and masseter. Trigger point to suboccipitals. Right   Tenderness of the levator scapulae, scalenes, suboccipitals and masseter. Trigger point to suboccipitals. Additional Palpation Details  Palpation of the suboccipitals recreates headache    Neurological Testing     Sensation   Cervical/Thoracic   Left   Intact: light touch    Right   Intact: light touch    Additional Neurological Details  Patient reports N&T in B/L arms when waking up or sitting and resting arm on the couch. Unable to replicate during testing.      Active Range of Motion   Cervical/Thoracic Spine       Cervical    Flexion:  with pain Restriction level: minimal  Extension:  with pain Restriction level: moderate  Left lateral flexion:  Restriction level: minimal  Right lateral flexion:  Restriction level minimal  Left rotation:  WFL  Right rotation:  [facility]    Thoracic    Flexion:  Restriction level: minimal  Extension:  Restriction level: moderate  Left rotation:  Restriction level: minimal  Right rotation:  Restriction level: minimal    Joint Play     Hypomobile: C4, C5, C6, C7, T1, T2, T3, T4, T5 and T6     Strength/Myotome Testing     Left Shoulder     Planes of Motion   Abduction: 5     Isolated Muscles   Lower trapezius: 4-   Middle trapezius: 4-   Serratus anterior: 4-     Right Shoulder     Planes of Motion   Abduction: 4+ Isolated Muscles   Lower trapezius: 4-   Middle trapezius: 4-   Serratus anterior: 4-     Left Elbow   Flexion: 5  Extension: 5    Right Elbow   Flexion: 4+  Extension: 4+    Left Wrist/Hand   Wrist extension: 5  Wrist flexion: 5    Right Wrist/Hand   Wrist extension: 4+  Wrist flexion: 4+    Additional Strength Details  Mildly diminished myotomes on R    Tests   Cervical   Positive neck flexor muscle endurance test.  Negative vertical compression. Left Shoulder   Positive ULTT1. Lumbar   Negative vertical compression. Neuro Exam:     Headaches   Patient reports headaches: Yes.               Precautions: n/a      Manuals 8/23            Cervical PROM             Cervical STM                                       Neuro Re-Ed             scap retraction iso HEP            Rows             Low rows             W's                                       Ther Ex             UBE             UT stretch HEP            LS stretch HEP            SNAG HEP                                                                Ther Activity                                       Gait Training                                       Modalities                          IE/HEP JF

## 2023-08-28 ENCOUNTER — OFFICE VISIT (OUTPATIENT)
Dept: PHYSICAL THERAPY | Facility: CLINIC | Age: 53
End: 2023-08-28
Payer: COMMERCIAL

## 2023-08-28 DIAGNOSIS — M54.2 NECK PAIN: Primary | ICD-10-CM

## 2023-08-28 DIAGNOSIS — G89.29 CHRONIC MIDLINE THORACIC BACK PAIN: ICD-10-CM

## 2023-08-28 DIAGNOSIS — M54.6 CHRONIC MIDLINE THORACIC BACK PAIN: ICD-10-CM

## 2023-08-28 PROCEDURE — 97110 THERAPEUTIC EXERCISES: CPT | Performed by: PHYSICAL THERAPIST

## 2023-08-28 PROCEDURE — 97112 NEUROMUSCULAR REEDUCATION: CPT | Performed by: PHYSICAL THERAPIST

## 2023-08-28 NOTE — PROGRESS NOTES
Daily Note     Today's date: 2023  Patient name: Samir Rodas  : 1970  MRN: [id number]  Referring provider: Kathrine Khan DO  Dx:   Encounter Diagnosis     ICD-10-CM    1. Neck pain  M54.2       2. Chronic midline thoracic back pain  M54.6     G89.29                      Subjective: Patient reports that her pain is about the same, but does note that sleeping was a little better this weekend. Objective: See treatment diary below. Treatment as indicated below. Assessment: Reviewed HEP to ensure proper form and to answer any questions regarding prescribed exercises. Patient demonstrates good recall and form with minimal cuing. Patient demonstrating improving tolerance for exercises. Continue to progress as tolerated. Patient will continue to benefit from skilled PT in order to address impairments and improve function. Plan: Continue per plan of care. Progress treatment as tolerated.        Precautions: n/a      Manuals            Cervical PROM             Cervical STM                                       Neuro Re-Ed             scap retraction iso HEP            Supine chin tuck  5" hold  x10           Supine chin tuck and lift  5" hold  x10           Supine chin tuck and band pull apart  5" hold  2x10  Pink           Rows  GTB  3x10           Low rows  GTB  3x10           W's  Pink  3x10                                     Ther Ex             UBE  3/3           UT stretch HEP 5x15"           LS stretch HEP 5x15"           SNAG HEP 10x10"                                                               Ther Activity                                       Gait Training                                       Modalities                          IE/HEP JF

## 2023-08-30 ENCOUNTER — OFFICE VISIT (OUTPATIENT)
Dept: PHYSICAL THERAPY | Facility: CLINIC | Age: 53
End: 2023-08-30
Payer: COMMERCIAL

## 2023-08-30 DIAGNOSIS — G89.29 CHRONIC MIDLINE THORACIC BACK PAIN: ICD-10-CM

## 2023-08-30 DIAGNOSIS — M54.6 CHRONIC MIDLINE THORACIC BACK PAIN: ICD-10-CM

## 2023-08-30 DIAGNOSIS — M54.2 NECK PAIN: Primary | ICD-10-CM

## 2023-08-30 PROCEDURE — 97110 THERAPEUTIC EXERCISES: CPT | Performed by: PHYSICAL THERAPIST

## 2023-08-30 PROCEDURE — 97112 NEUROMUSCULAR REEDUCATION: CPT | Performed by: PHYSICAL THERAPIST

## 2023-08-30 PROCEDURE — 97140 MANUAL THERAPY 1/> REGIONS: CPT | Performed by: PHYSICAL THERAPIST

## 2023-08-30 NOTE — PROGRESS NOTES
Daily Note     Today's date: 2023  Patient name: Dominique Shepard  : 1970  MRN: [id number]  Referring provider: Tom Carrera DO  Dx:   Encounter Diagnosis     ICD-10-CM    1. Neck pain  M54.2       2. Chronic midline thoracic back pain  M54.6     G89.29                      Subjective: Patient reports that her neck was sore following Monday's session, continues to be sore today, but not as bad. Objective: See treatment diary below. Treatment as indicated below. Assessment: Trialed cupping, which improved patient's symptoms. Patient able to tolerate additional scapular and cervical strengthening. Continue to progress as tolerated. Patient will continue to benefit from skilled PT in order to address impairments and improve function. Plan: Continue per plan of care. Progress treatment as tolerated.        Precautions: n/a      Manuals           Cervical PROM             Cervical STM   UT, LS  5 min    JF                                    Neuro Re-Ed             scap retraction iso HEP            Supine chin tuck  5" hold  x10 5" hold  x10          Supine chin tuck and lift  5" hold  x10 5" hold  x10          Supine chin tuck and band pull apart  5" hold  2x10  Pink 5" hold  2x10  Pink          Rows  GTB  3x10 GTB  3x10          Low rows  GTB  3x10 GTB  3x10          W's  Pink  3x10 Pink  3x10          Wall angels   2x10          SA foam roll   2x10          Ther Ex             UBE  3/3 3/3          UT stretch HEP 5x15" 5x15"  With cupping          LS stretch HEP 5x15" 5x15"  With cupping          SNAG HEP 10x10" 10x10"                                                              Ther Activity                                       Gait Training                                       Modalities             Cupping    Large cup    UT/LS    8 min          IE/HEP JF

## 2023-09-06 ENCOUNTER — OFFICE VISIT (OUTPATIENT)
Dept: PHYSICAL THERAPY | Facility: CLINIC | Age: 53
End: 2023-09-06
Payer: COMMERCIAL

## 2023-09-06 DIAGNOSIS — M54.6 CHRONIC MIDLINE THORACIC BACK PAIN: ICD-10-CM

## 2023-09-06 DIAGNOSIS — M54.2 NECK PAIN: Primary | ICD-10-CM

## 2023-09-06 DIAGNOSIS — G89.29 CHRONIC MIDLINE THORACIC BACK PAIN: ICD-10-CM

## 2023-09-06 PROCEDURE — 97110 THERAPEUTIC EXERCISES: CPT | Performed by: PHYSICAL THERAPIST

## 2023-09-06 PROCEDURE — 97112 NEUROMUSCULAR REEDUCATION: CPT | Performed by: PHYSICAL THERAPIST

## 2023-09-06 NOTE — PROGRESS NOTES
Daily Note     Today's date: 2023  Patient name: Raymond Bergman  : 1970  MRN: [id number]  Referring provider: Brenda Roman DO  Dx:   Encounter Diagnosis     ICD-10-CM    1. Neck pain  M54.2       2. Chronic midline thoracic back pain  M54.6     G89.29                      Subjective: Patient reports that there was a definite difference in her neck pain and headaches this past week following last treatment. N&T in the hands remains. Objective: See treatment diary below. Treatment as indicated below. Assessment: Patient again reported improvement of symptoms following cupping. Patient was able to tolerate progression of resistance exercises without an increase in pain. They demonstrated muscular fatigue as expected with progression. DNF endurance improving. Continue to progress as tolerated. Patient will continue to benefit from skilled PT in order to address impairments and improve function. Plan: Continue per plan of care. Progress treatment as tolerated.        Precautions: n/a      Manuals          Cervical PROM             Cervical STM   UT, LS  5 min    JF                                    Neuro Re-Ed             scap retraction iso HEP            Supine chin tuck  5" hold  x10 5" hold  x10 5" hold  x10         Supine chin tuck and lift  5" hold  x10 5" hold  x10 5" hold  x10         Supine chin tuck and band pull apart  5" hold  2x10  Pink 5" hold  2x10  Pink 5" hold  2x10  pink         Rows  GTB  3x10 GTB  3x10 BTB  3x10         Low rows  GTB  3x10 GTB  3x10 BTB  3x10         W's  Pink  3x10 Pink  3x10 Pink  3x10         Wall angels   2x10 2x10         SA foam roll   2x10 2x10         Ther Ex             UBE  3/3 3/3 3/3         UT stretch HEP 5x15" 5x15"  With cupping 5x15" with cupping         LS stretch HEP 5x15" 5x15"  With cupping 5x15"  With cupping           SNAG HEP 10x10" 10x10" 10x10"                                                             Ther Activity                                       Gait Training                                       Modalities             Cupping    Large cup    UT/LS    8 min Large cup    UT/LS    8 min         IE/HEP JF

## 2023-09-08 ENCOUNTER — OFFICE VISIT (OUTPATIENT)
Dept: PHYSICAL THERAPY | Facility: CLINIC | Age: 53
End: 2023-09-08
Payer: COMMERCIAL

## 2023-09-08 DIAGNOSIS — G89.29 CHRONIC MIDLINE THORACIC BACK PAIN: ICD-10-CM

## 2023-09-08 DIAGNOSIS — M54.6 CHRONIC MIDLINE THORACIC BACK PAIN: ICD-10-CM

## 2023-09-08 DIAGNOSIS — M54.2 NECK PAIN: Primary | ICD-10-CM

## 2023-09-08 PROCEDURE — 97110 THERAPEUTIC EXERCISES: CPT

## 2023-09-08 PROCEDURE — 97140 MANUAL THERAPY 1/> REGIONS: CPT

## 2023-09-08 PROCEDURE — 97112 NEUROMUSCULAR REEDUCATION: CPT

## 2023-09-08 NOTE — PROGRESS NOTES
Daily Note     Today's date: 2023  Patient name: Vinod Aguayo  : 1970  MRN: [id number]  Referring provider: Ted Valdez DO  Dx:   Encounter Diagnosis     ICD-10-CM    1. Neck pain  M54.2       2. Chronic midline thoracic back pain  M54.6     G89.29                      Subjective: Patient reported a mild headache yesterday that progressed throughout the day. N/T getting better. Objective: See treatment diary below. Assessment: Did well with cupping, administering bilaterally today due to report of tightness. Improving DNF and postural endurance with good form throughout. Skilled PT remains appropriate to further address cervical soft tissue restrictions, muscular tightness, and DNF/posutral weakness. Plan: Continue per plan of care. Progress treatment as tolerated. Will be on vacation next week and return to PT the following week.          Precautions: n/a    Manuals         Cervical PROM             Cervical STM   UT, LS  5 min    JF                                    Neuro Re-Ed             scap retraction iso HEP            Supine chin tuck  5" hold  x10 5" hold  x10 5" hold  x10 5"x10        Supine chin tuck and lift  5" hold  x10 5" hold  x10 5" hold  x10 5"x10        Supine chin tuck and band pull apart  5" hold  2x10  Pink 5" hold  2x10  Pink 5" hold  2x10  pink Pink  5" hold,  2x10        Rows  GTB  3x10 GTB  3x10 BTB  3x10 BTB  3x10        Low rows  GTB  3x10 GTB  3x10 BTB  3x10 BTB  3x10        W's  Pink  3x10 Pink  3x10 Pink  3x10 Pink  3x10        Wall angels   2x10 2x10 2x10        SA foam roll   2x10 2x10 2x10                     Ther Ex             UBE  3/3 3/3 3/3 3 min  ea        UT stretch HEP 5x15" 5x15"  With cupping 5x15" with cupping With cupping  15"x5        LS stretch HEP 5x15" 5x15"  With cupping 5x15"  With cupping   With cupping  15"x5        SNAG HEP 10x10" 10x10" 10x10" 10"x  10 Ther Activity                                       Gait Training                                       Modalities             Cupping    Large cup    UT/LS    8 min Large cup    UT/LS    8 min Large cup  UT/LS          IE/HEP JF

## 2023-09-18 ENCOUNTER — APPOINTMENT (OUTPATIENT)
Dept: PHYSICAL THERAPY | Facility: CLINIC | Age: 53
End: 2023-09-18
Payer: COMMERCIAL

## 2023-09-20 ENCOUNTER — OFFICE VISIT (OUTPATIENT)
Dept: PHYSICAL THERAPY | Facility: CLINIC | Age: 53
End: 2023-09-20
Payer: COMMERCIAL

## 2023-09-20 DIAGNOSIS — G89.29 CHRONIC MIDLINE THORACIC BACK PAIN: ICD-10-CM

## 2023-09-20 DIAGNOSIS — M54.6 CHRONIC MIDLINE THORACIC BACK PAIN: ICD-10-CM

## 2023-09-20 DIAGNOSIS — M54.2 NECK PAIN: Primary | ICD-10-CM

## 2023-09-20 PROCEDURE — 97112 NEUROMUSCULAR REEDUCATION: CPT | Performed by: PHYSICAL THERAPIST

## 2023-09-20 PROCEDURE — 97110 THERAPEUTIC EXERCISES: CPT | Performed by: PHYSICAL THERAPIST

## 2023-09-20 NOTE — PROGRESS NOTES
Daily Note     Today's date: 2023  Patient name: Raymond Bergman  : 1970  MRN: [id number]  Referring provider: Brenda Roman DO  Dx:   Encounter Diagnosis     ICD-10-CM    1. Neck pain  M54.2       2. Chronic midline thoracic back pain  M54.6     G89.29                      Subjective: Patient reports that her headaches have been better. Still getting mild tingling with sleeping. Neck feel sore today after bike week. Not sure if it was the hotel pillows or riding on the back of the bike that bothered her neck. Objective: See treatment diary below. Assessment: Patient reported soreness following exercise. Tolerated cupping well. Functional DNF endurance improving. Continue to progress as tolerated. Patient will continue to benefit from skilled PT in order to address impairments and improve function. Plan: Continue per plan of care. Progress treatment as tolerated.             Precautions: n/a    Manuals        Cervical PROM             Cervical STM   UT, LS  5 min    JF                                    Neuro Re-Ed             scap retraction iso HEP            Supine chin tuck  5" hold  x10 5" hold  x10 5" hold  x10 5"x10 5" x10       Supine chin tuck and lift  5" hold  x10 5" hold  x10 5" hold  x10 5"x10 5"x10       Supine chin tuck and band pull apart  5" hold  2x10  Pink 5" hold  2x10  Pink 5" hold  2x10  pink Pink  5" hold,  2x10 Pink  5" hold  2x10       Rows  GTB  3x10 GTB  3x10 BTB  3x10 BTB  3x10 BTB  3x10       Low rows  GTB  3x10 GTB  3x10 BTB  3x10 BTB  3x10 BTB  3x10       W's  Pink  3x10 Pink  3x10 Pink  3x10 Pink  3x10 Pink  3x10       Wall angels   2x10 2x10 2x10 2x10       SA foam roll   2x10 2x10 2x10 2x10                    Ther Ex             UBE  3/3 3/3 3/3 3 min  ea 3 min ea        UT stretch HEP 5x15" 5x15"  With cupping 5x15" with cupping With cupping  15"x5 With cupping  15"x5       LS stretch HEP 5x15" 5x15"  With cupping 5x15"  With cupping   With cupping  15"x5 With cupping  15"x5       SNAG HEP 10x10" 10x10" 10x10" 10"x  10 10"x10                                                           Ther Activity                                       Gait Training                                       Modalities             Cupping    Large cup    UT/LS    8 min Large cup    UT/LS    8 min Large cup  UT/LS   Large  Cup  UT/LS    8 min       IE/HEP JF

## 2023-09-25 ENCOUNTER — APPOINTMENT (OUTPATIENT)
Dept: PHYSICAL THERAPY | Facility: CLINIC | Age: 53
End: 2023-09-25
Payer: COMMERCIAL

## 2023-09-27 ENCOUNTER — OFFICE VISIT (OUTPATIENT)
Dept: PHYSICAL THERAPY | Facility: CLINIC | Age: 53
End: 2023-09-27
Payer: COMMERCIAL

## 2023-09-27 DIAGNOSIS — M54.6 CHRONIC MIDLINE THORACIC BACK PAIN: ICD-10-CM

## 2023-09-27 DIAGNOSIS — G89.29 CHRONIC MIDLINE THORACIC BACK PAIN: ICD-10-CM

## 2023-09-27 DIAGNOSIS — M54.2 NECK PAIN: Primary | ICD-10-CM

## 2023-09-27 PROCEDURE — 97112 NEUROMUSCULAR REEDUCATION: CPT | Performed by: PHYSICAL THERAPIST

## 2023-09-27 PROCEDURE — 97110 THERAPEUTIC EXERCISES: CPT | Performed by: PHYSICAL THERAPIST

## 2023-09-27 NOTE — PROGRESS NOTES
Daily Note     Today's date: 2023  Patient name: Dani Mandujano  : 1970  MRN: [id number]  Referring provider: Arnaldo Larkin DO  Dx:   Encounter Diagnosis     ICD-10-CM    1. Neck pain  M54.2       2. Chronic midline thoracic back pain  M54.6     G89.29                      Subjective: Patient reports that her neck is feeling better than last week. Didn't sleep well last night due to her 's recent shoulder surgery. Objective: See treatment diary below. Assessment: Patient reported soreness following exercise, especially the wall angels at the end of the session. Tolerated cupping well. Functional DNF endurance improving. Continue to progress as tolerated. Patient will continue to benefit from skilled PT in order to address impairments and improve function. Plan: Continue per plan of care. Progress treatment as tolerated.             Precautions: n/a    Manuals       Cervical PROM             Cervical STM   UT, LS  5 min    JF                                    Neuro Re-Ed             scap retraction iso HEP            Supine chin tuck  5" hold  x10 5" hold  x10 5" hold  x10 5"x10 5" x10 5"  x10      Supine chin tuck and lift  5" hold  x10 5" hold  x10 5" hold  x10 5"x10 5"x10 5"x10      Supine chin tuck and band pull apart  5" hold  2x10  Pink 5" hold  2x10  Pink 5" hold  2x10  pink Pink  5" hold,  2x10 Pink  5" hold  2x10 Pink  5" hold  2x10      Rows  GTB  3x10 GTB  3x10 BTB  3x10 BTB  3x10 BTB  3x10 Purple  3x10      Low rows  GTB  3x10 GTB  3x10 BTB  3x10 BTB  3x10 BTB  3x10 Purple  3x10      W's  Pink  3x10 Pink  3x10 Pink  3x10 Pink  3x10 Pink  3x10 Pink  3x10      Wall angels   2x10 2x10 2x10 2x10 2x10      SA foam roll   2x10 2x10 2x10 2x10 2x10                   Ther Ex             UBE  3/3 3/3 3/3 3 min  ea 3 min ea  3 min      UT stretch HEP 5x15" 5x15"  With cupping 5x15" with cupping With cupping  15"x5 With cupping  15"x5 With cupping  15"x5      LS stretch HEP 5x15" 5x15"  With cupping 5x15"  With cupping   With cupping  15"x5 With cupping  15"x5 With cupping  15"x5      SNAG HEP 10x10" 10x10" 10x10" 10"x  10 10"x10 10"x10                                                          Ther Activity                                       Gait Training                                       Modalities             Cupping    Large cup    UT/LS    8 min Large cup    UT/LS    8 min Large cup  UT/LS   Large  Cup  UT/LS    8 min Large  Cup  UT/LS    8 min      IE/HEP JF

## 2023-09-29 ENCOUNTER — OFFICE VISIT (OUTPATIENT)
Dept: PHYSICAL THERAPY | Facility: CLINIC | Age: 53
End: 2023-09-29
Payer: COMMERCIAL

## 2023-09-29 DIAGNOSIS — G89.29 CHRONIC MIDLINE THORACIC BACK PAIN: ICD-10-CM

## 2023-09-29 DIAGNOSIS — M54.2 NECK PAIN: Primary | ICD-10-CM

## 2023-09-29 DIAGNOSIS — M54.6 CHRONIC MIDLINE THORACIC BACK PAIN: ICD-10-CM

## 2023-09-29 PROCEDURE — 97110 THERAPEUTIC EXERCISES: CPT

## 2023-09-29 PROCEDURE — 97112 NEUROMUSCULAR REEDUCATION: CPT

## 2023-09-29 NOTE — PROGRESS NOTES
Daily Note     Today's date: 2023  Patient name: Lorna Ayala  : 1970  MRN: [id number]  Referring provider: Elpidio Vasquez DO  Dx:   Encounter Diagnosis     ICD-10-CM    1. Neck pain  M54.2       2. Chronic midline thoracic back pain  M54.6     G89.29                      Subjective: Patient reported her neck has been feeling okay. Has been helping her  out more with his recent surgery. Objective: See treatment diary below. Assessment: We continued focus on postural and DNF strengthening with good challenge, muscle fatigue as expected. Responds well to cupping technique with moderate erythema response following use indicating presence of tissue restrictions in bilateral UTs. Skilled PT remains appropriate at this time to further improve subjective and objective reports through strength and mobility gains. Plan: Continue per plan of care. Progress treatment as tolerated.              Precautions: n/a    Manuals      Cervical PROM             Cervical STM   UT, LS  5 min    JF                                    Neuro Re-Ed             scap retraction iso HEP            Supine chin tuck  5" hold  x10 5" hold  x10 5" hold  x10 5"x10 5" x10 5"  x10 5"x10     Supine chin tuck and lift  5" hold  x10 5" hold  x10 5" hold  x10 5"x10 5"x10 5"x10 5"x10     Supine chin tuck and band pull apart  5" hold  2x10  Pink 5" hold  2x10  Pink 5" hold  2x10  pink Pink  5" hold,  2x10 Pink  5" hold  2x10 Pink  5" hold  2x10 Pink  5" hold,  2x10     Rows  GTB  3x10 GTB  3x10 BTB  3x10 BTB  3x10 BTB  3x10 Purple  3x10 Purple  3x10     Low rows  GTB  3x10 GTB  3x10 BTB  3x10 BTB  3x10 BTB  3x10 Purple  3x10 Purple  3x10     W's  Pink  3x10 Pink  3x10 Pink  3x10 Pink  3x10 Pink  3x10 Pink  3x10 Pink  3x10     Wall angels   2x10 2x10 2x10 2x10 2x10 2x10     SA foam roll   2x10 2x10 2x10 2x10 2x10 2x10                  Ther Ex             UBE  3/3 3/3 3/3 3 min  ea 3 min ea  3 min 3 min     UT stretch HEP 5x15" 5x15"  With cupping 5x15" with cupping With cupping  15"x5 With cupping  15"x5 With cupping  15"x5 With cupping  15"x5     LS stretch HEP 5x15" 5x15"  With cupping 5x15"  With cupping   With cupping  15"x5 With cupping  15"x5 With cupping  15"x5 With cupping  15"x5     SNAG HEP 10x10" 10x10" 10x10" 10"x  10 10"x10 10"x10 10"x  10                                                         Ther Activity                                       Gait Training                                       Modalities             Cupping    Large cup    UT/LS    8 min Large cup    UT/LS    8 min Large cup  UT/LS   Large  Cup  UT/LS    8 min Large  Cup  UT/LS    8 min Large cup UT/LS bilat  3 min each static and with stretches     IE/HEP JF

## 2023-10-02 ENCOUNTER — OFFICE VISIT (OUTPATIENT)
Dept: PHYSICAL THERAPY | Facility: CLINIC | Age: 53
End: 2023-10-02
Payer: COMMERCIAL

## 2023-10-02 DIAGNOSIS — M54.2 NECK PAIN: Primary | ICD-10-CM

## 2023-10-02 DIAGNOSIS — M54.6 CHRONIC MIDLINE THORACIC BACK PAIN: ICD-10-CM

## 2023-10-02 DIAGNOSIS — G89.29 CHRONIC MIDLINE THORACIC BACK PAIN: ICD-10-CM

## 2023-10-02 PROCEDURE — 97110 THERAPEUTIC EXERCISES: CPT

## 2023-10-02 PROCEDURE — 97112 NEUROMUSCULAR REEDUCATION: CPT

## 2023-10-02 NOTE — PROGRESS NOTES
Daily Note     Today's date: 10/2/2023  Patient name: Samir Rodas  : 1970  MRN: [id number]  Referring provider: Kathrine Khan DO  Dx:   Encounter Diagnosis     ICD-10-CM    1. Neck pain  M54.2       2. Chronic midline thoracic back pain  M54.6     G89.29                      Subjective: Patient reported having continued discomfort throughout the day, especially with sitting and having to wear sports bra which places increased stress on her neck. Objective: See treatment diary below. Assessment: Scapular strengthening progressed to further work on postural endurance to reduce symptoms with prolonged seated positions with work as fatigue sets in. Responds well to cupping technique with moderate erythema response following use indicating presence of tissue restrictions in bilateral UTs. Skilled PT remains appropriate at this time to further improve DNF and postural endurance to reduce pain and limitations with day to day activities. Plan: Continue per plan of care. Progress treatment as tolerated.              Precautions: n/a    Manuals 8/23 8/28 8/30 9/6 9/8 9/20 9/27 9/29 10/2    Cervical PROM             Cervical STM   UT, LS  5 min    JF                                    Neuro Re-Ed             scap retraction iso HEP            Supine chin tuck  5" hold  x10 5" hold  x10 5" hold  x10 5"x10 5" x10 5"  x10 5"x10 5"x15    Supine chin tuck and lift  5" hold  x10 5" hold  x10 5" hold  x10 5"x10 5"x10 5"x10 5"x10 5"x15    Supine chin tuck and band pull apart  5" hold  2x10  Pink 5" hold  2x10  Pink 5" hold  2x10  pink Pink  5" hold,  2x10 Pink  5" hold  2x10 Pink  5" hold  2x10 Pink  5" hold,  2x10 Pink  5" hold,  2x10    Rows  GTB  3x10 GTB  3x10 BTB  3x10 BTB  3x10 BTB  3x10 Purple  3x10 Purple  3x10 Purple  3x10    Low rows  GTB  3x10 GTB  3x10 BTB  3x10 BTB  3x10 BTB  3x10 Purple  3x10 Purple  3x10 Purple  3x10    W's  Pink  3x10 Pink  3x10 Pink  3x10 Pink  3x10 Pink  3x10 Pink  3x10 Pink  3x10 Pink  3x10    Wall angels   2x10 2x10 2x10 2x10 2x10 2x10 2x10    SA foam roll   2x10 2x10 2x10 2x10 2x10 2x10 2x10    Prone I,T,Y          x15 ea                 Ther Ex             UBE  3/3 3/3 3/3 3 min  ea 3 min ea  3 min 3 min 3 min    UT stretch HEP 5x15" 5x15"  With cupping 5x15" with cupping With cupping  15"x5 With cupping  15"x5 With cupping  15"x5 With cupping  15"x5 With cupping  15"x5    LS stretch HEP 5x15" 5x15"  With cupping 5x15"  With cupping   With cupping  15"x5 With cupping  15"x5 With cupping  15"x5 With cupping  15"x5 With cupping  15"x5    SNAG HEP 10x10" 10x10" 10x10" 10"x  10 10"x10 10"x10 10"x  10 10"x  10                                           Ther Activity                                       Gait Training                                       Modalities             Cupping    Large cup    UT/LS    8 min Large cup    UT/LS    8 min Large cup  UT/LS   Large  Cup  UT/LS    8 min Large  Cup  UT/LS    8 min Large cup UT/LS bilat  3 min each static and with stretches Large cup UT/LS bilat   3 min each static and with stretches    IE/HEP JF

## 2023-10-05 ENCOUNTER — APPOINTMENT (OUTPATIENT)
Dept: PHYSICAL THERAPY | Facility: CLINIC | Age: 53
End: 2023-10-05
Payer: COMMERCIAL

## 2023-10-09 ENCOUNTER — OFFICE VISIT (OUTPATIENT)
Dept: PHYSICAL THERAPY | Facility: CLINIC | Age: 53
End: 2023-10-09
Payer: COMMERCIAL

## 2023-10-09 DIAGNOSIS — M54.6 CHRONIC MIDLINE THORACIC BACK PAIN: ICD-10-CM

## 2023-10-09 DIAGNOSIS — G89.29 CHRONIC MIDLINE THORACIC BACK PAIN: ICD-10-CM

## 2023-10-09 DIAGNOSIS — M54.2 NECK PAIN: Primary | ICD-10-CM

## 2023-10-09 PROCEDURE — 97110 THERAPEUTIC EXERCISES: CPT | Performed by: PHYSICAL THERAPIST

## 2023-10-09 PROCEDURE — 97112 NEUROMUSCULAR REEDUCATION: CPT | Performed by: PHYSICAL THERAPIST

## 2023-10-09 NOTE — PROGRESS NOTES
PT Re-evaluation     Today's date: 10/9/2023   Patient name: Shanta Baum  : 1970  MRN: [id number]  Referring provider: Myra Moreno DO  Dx:   Encounter Diagnosis     ICD-10-CM    1. Neck pain  M54.2 Ambulatory Referral to Physical Therapy      2. Chronic midline thoracic back pain  M54.6 Ambulatory Referral to Physical Therapy    G89.29                      Assessment/Plan  Patient is a 48 y.o. female presenting to OP PT with neck and upper back pain. They have attended 10 visits over 6 weeks. Since starting therapy pt has made the following progress towards goals:  1) Pain: Patient reports that their max pain has decreased to 4/10 from 8/10  2) Range of motion: Patient's cervical ROM improved WNL  3.)Strength: Patient's parascapular strength has improved to at least 4+     Patient is progressing well. They continue to have deficits with cervical muscle tightness, decreased DNF endurance, decreased scapular strength. Continue to progress as tolerated. Patient will continue to benefit from skilled PT in order to address impairments and improve function. Further progress may be limited by body habitus, patient to consult plastic surgery as planned. See updated goals below. Goals  STG (3 weeks)  Pain: Patient will subjectively report maximum pain decreased by 2/10- MET  Strength: Patient's will demonstrate scapular strength improved by 1/2 grade- MET  ROM: Patient will increase thoracic mobility by 25%- MET  Function: Patient increase score on FOTO by 10 points- MET    LTG (6 weeks)  Pain: Patient will subjectively report less than 2/10 pain with functional activities. - progressing  Strength: Patient's will demonstrate scapular strength improved to WNL- progressing  ROM: Patient will increase thoracic mobility by 50%. - MET  Function: Patient will increase score on FOTO to predicted value or better- progressing  Patient will be ind with HEP by [place] details: Prognosis above is given PT services 1-2x/week over the next 4 weeks and home program adherence. Patient would benefit from: skilled physical therapy, home exercise program  Referral necessary: no  Planned modality interventions: Hydrocollator packs, Cryotherapy, TENS and Low level laser  Planned therapy interventions: joint mobilization, manual therapy, massage, neuromuscular re-education, patient education, stretching, strengthening, therapeutic activities, therapeutic exercise, home exercise program, functional ROM exercises, gait training, flexibility, balance, abdominal trunk stabilization, motor coordination training, coordination and behavior modification  Frequency: 1-2x/week for 4 weeks  Duration in visits: 4-8  Plan of Care beginning date: 10/9/2023    Plan of Care expiration date: 11/6/2023  Treatment plan discussed with: patient    Subjective  UPDATE 10/9/2023: Patient reports that her neck is feeling better than it did 6 weeks ago. She is no longer having regular headaches and the numbness/tingling she was experiencing is better. Still has tightness in the neck. Upper back still hurts the standing and walking for awhile. Overall, patient feels that her symptoms are 50% better. IE (8/23/2023): Patient is a 50 y.o. female who presents for an initial outpatient physical therapy consultation regarding their neck and upper back pain. Patient reports that her pain has been on for years. She has tried PT in the past, as well as the chiropractor. Didn't help her neck pain. It is affecting her sleep, unable to get comfortable. She is considering a breast reduction to help with her symptoms. Aggravating factors: standing, walking for awhile, working.    Alleviating factors: heat, advil, neck stretches  Functional limitations: ADLs, work      Pain  Best: 0/10 (was 1/10)  Worst: 4/10 (was 8/10)  Irritability: minutes to hours  Location: occipital region, base of neck, shoulders  Quality: dull and achy  Progression: improving    Patient Goals for Therapy- progressing  "reduce the pain, reduce headache frequency"    Objective   Postural Observations  Seated posture: fair  Standing posture: fair        Palpation   Left   Tenderness of the levator scapulae, scalenes, suboccipitals and masseter. Trigger point to suboccipitals. 10/9/2023: improved since IE    Right   Tenderness of the levator scapulae, scalenes, suboccipitals and masseter. Trigger point to suboccipitals. 10/9/2023: improved since IE    Additional Palpation Details  Palpation of the suboccipitals recreates headache    Neurological Testing     Sensation   Cervical/Thoracic   Left   Intact: light touch    Right   Intact: light touch    Additional Neurological Details  Patient reports N&T in B/L arms when waking up or sitting and resting arm on the couch. Unable to replicate during testing.    10/9/2023: Patient no longer gets N&T    Active Range of Motion   Cervical/Thoracic Spine       Cervical    Flexion: WNL with pulling discomfort (was minimal restriction with pain)   Extension:  WNL (was moderate restriction with pain)   Left lateral flexion:  WNL with pulling discomfort (was minimal restriction with pain)   Right lateral flexion:  WNL with pulling discomfort (was minimal restriction with pain)   Left rotation:  WFL  Right rotation:  [facility]    Thoracic    Flexion:  Restriction level: minimal  Extension:  Restriction level: minimal (was moderate)  Left rotation:  Restriction level: minimal  Right rotation:  Restriction level: minimal    Joint Play     Hypomobile: C4, C5, C6, C7, T1, T2, T3, T4, T5 and T6     Strength/Myotome Testing     Left Shoulder     Planes of Motion   Abduction: 5     Isolated Muscles   Lower trapezius: 4+ (was 4-)    Middle trapezius: 4+ (was 4-)   Serratus anterior: 4+ (was 4-)      Right Shoulder     Planes of Motion   Abduction: 5 (was 4+)     Isolated Muscles   Lower trapezius: 4+ (was 4-)   Middle trapezius: 4+ (was 4-)    Serratus anterior: 4+ (was 4-)      Left Elbow   Flexion: 5  Extension: 5    Right Elbow   Flexion: 5 (was 4+)  Extension: 5 (was 4+)    Left Wrist/Hand   Wrist extension: 5  Wrist flexion: 5    Right Wrist/Hand   Wrist extension: 5 (was 4+)  Wrist flexion: 5 (was 4+)    Additional Strength Details  Mildly diminished myotomes on R    Tests   Cervical   Positive neck flexor muscle endurance test.  10/9/2023 (+): 20 seconds  Negative vertical compression. Left Shoulder   Positive ULTT1.   10/9/2023: (-)    Lumbar   Negative vertical compression. Neuro Exam:     Headaches   Patient reports headaches: Yes.    10/9/2023 no longer having headaches             Precautions: n/a    Manuals 8/23 8/28 8/30 9/6 9/8 9/20 9/27 9/29 10/2  10/9   Cervical PROM                    re-eval   Cervical STM     UT, LS  5 min     JF                                                                 Neuro Re-Ed                       scap retraction iso HEP                     Supine chin tuck   5" hold  x10 5" hold  x10 5" hold  x10 5"x10 5" x10 5"  x10 5"x10 5"x15     Supine chin tuck and lift   5" hold  x10 5" hold  x10 5" hold  x10 5"x10 5"x10 5"x10 5"x10 5"x15     Supine chin tuck and band pull apart   5" hold  2x10  Pink 5" hold  2x10  Pink 5" hold  2x10  pink Pink  5" hold,  2x10 Pink  5" hold  2x10 Pink  5" hold  2x10 Pink  5" hold,  2x10 Pink  5" hold,  2x10     Rows   GTB  3x10 GTB  3x10 BTB  3x10 BTB  3x10 BTB  3x10 Purple  3x10 Purple  3x10 Purple  3x10  Purple  3x10   Low rows   GTB  3x10 GTB  3x10 BTB  3x10 BTB  3x10 BTB  3x10 Purple  3x10 Purple  3x10 Purple  3x10  Purple  3x10   W's   Pink  3x10 Pink  3x10 Pink  3x10 Pink  3x10 Pink  3x10 Pink  3x10 Pink  3x10 Pink  3x10  Purple  3x10   Wall angels     2x10 2x10 2x10 2x10 2x10 2x10 2x10  2x10   SA foam roll     2x10 2x10 2x10 2x10 2x10 2x10 2x10  2x10   Prone I,T,Y                  x15 ea                             Ther Ex                       UBE   3/3 3/3 3/3 3 min  ea 3 min ea 3 min 3 min 3 min  3 min   UT stretch HEP 5x15" 5x15"  With cupping 5x15" with cupping With cupping  15"x5 With cupping  15"x5 With cupping  15"x5 With cupping  15"x5 With cupping  15"x5  15"x5   LS stretch HEP 5x15" 5x15"  With cupping 5x15"  With cupping    With cupping  15"x5 With cupping  15"x5 With cupping  15"x5 With cupping  15"x5 With cupping  15"x5  15"x5   SNAG HEP 10x10" 10x10" 10x10" 10"x  10 10"x10 10"x10 10"x  10 10"x  10  10x10"                                                                           Ther Activity                                                                       Gait Training                                                                       Modalities                       Cupping      Large cup     UT/LS     8 min Large cup     UT/LS     8 min Large cup  UT/LS    Large  Cup  UT/LS     8 min Large  Cup  UT/LS     8 min Large cup UT/LS bilat  3 min each static and with stretches Large cup UT/LS bilat   3 min each static and with stretches     IE/HEP JF                  re-eval

## 2023-10-11 ENCOUNTER — OFFICE VISIT (OUTPATIENT)
Dept: PHYSICAL THERAPY | Facility: CLINIC | Age: 53
End: 2023-10-11
Payer: COMMERCIAL

## 2023-10-11 DIAGNOSIS — M54.6 CHRONIC MIDLINE THORACIC BACK PAIN: ICD-10-CM

## 2023-10-11 DIAGNOSIS — G89.29 CHRONIC MIDLINE THORACIC BACK PAIN: ICD-10-CM

## 2023-10-11 DIAGNOSIS — M54.2 NECK PAIN: Primary | ICD-10-CM

## 2023-10-11 PROCEDURE — 97112 NEUROMUSCULAR REEDUCATION: CPT

## 2023-10-11 PROCEDURE — 97110 THERAPEUTIC EXERCISES: CPT

## 2023-10-11 NOTE — PROGRESS NOTES
Daily Note     Today's date: 10/11/2023  Patient name: Mayra Cash  : 1970  MRN: [id number]  Referring provider: Cassandra Muller DO  Dx:   Encounter Diagnosis     ICD-10-CM    1. Neck pain  M54.2       2. Chronic midline thoracic back pain  M54.6     G89.29                      Subjective: Patient denied headaches since last session but some mild numbness/tingling at night when trying to sleep. Objective: See treatment diary below. Assessment: Improving flexibility through UT/LS despite continued restrictions with erythema response to bilateral UTs with cupping. Minimal cueing for postural strengthening. Progressing with conservative management but still limited by unresolved N/T and mid back pain with prolonged standing and sitting. Plan: Continue per plan of care. Progress treatment as tolerated.            Precautions: n/a    Manuals 8/23 8/28 8/30 9/6 9/8 9/20 9/27 9/29 10/2  10/9 10/11   Cervical PROM                    re-eval    Cervical STM     UT, LS  5 min     JF                                                                    Neuro Re-Ed                        scap retraction iso HEP                      Supine chin tuck   5" hold  x10 5" hold  x10 5" hold  x10 5"x10 5" x10 5"  x10 5"x10 5"x15   5"x15   Supine chin tuck and lift   5" hold  x10 5" hold  x10 5" hold  x10 5"x10 5"x10 5"x10 5"x10 5"x15   5"x15   Supine chin tuck and band pull apart   5" hold  2x10  Pink 5" hold  2x10  Pink 5" hold  2x10  pink Pink  5" hold,  2x10 Pink  5" hold  2x10 Pink  5" hold  2x10 Pink  5" hold,  2x10 Pink  5" hold,  2x10   Pink  5" hold,  2x10   Rows   GTB  3x10 GTB  3x10 BTB  3x10 BTB  3x10 BTB  3x10 Purple  3x10 Purple  3x10 Purple  3x10  Purple  3x10 Purple  3x10   Low rows   GTB  3x10 GTB  3x10 BTB  3x10 BTB  3x10 BTB  3x10 Purple  3x10 Purple  3x10 Purple  3x10  Purple  3x10 Purple  3x10   W's   Pink  3x10 Pink  3x10 Pink  3x10 Pink  3x10 Pink  3x10 Pink  3x10 Pink  3x10 Pink  3x10 Purple  3x10 Purple  3x10   Wall angels     2x10 2x10 2x10 2x10 2x10 2x10 2x10  2x10 2x10   SA foam roll     2x10 2x10 2x10 2x10 2x10 2x10 2x10  2x10 2x10   Prone I,T,Y                  x15 ea   x15  ea                            Ther Ex                        UBE   3/3 3/3 3/3 3 min  ea 3 min ea  3 min 3 min 3 min  3 min 3 min  ea   UT stretch HEP 5x15" 5x15"  With cupping 5x15" with cupping With cupping  15"x5 With cupping  15"x5 With cupping  15"x5 With cupping  15"x5 With cupping  15"x5  15"x5 15"x5   LS stretch HEP 5x15" 5x15"  With cupping 5x15"  With cupping    With cupping  15"x5 With cupping  15"x5 With cupping  15"x5 With cupping  15"x5 With cupping  15"x5  15"x5 15"x5   SNAG HEP 10x10" 10x10" 10x10" 10"x  10 10"x10 10"x10 10"x  10 10"x  10  10x10" 10"x  10                                                                              Ther Activity                                                                          Gait Training                                                                          Modalities                        Cupping      Large cup     UT/LS     8 min Large cup     UT/LS     8 min Large cup  UT/LS    Large  Cup  UT/LS     8 min Large  Cup  UT/LS     8 min Large cup UT/LS bilat  3 min each static and with stretches Large cup UT/LS bilat   3 min each static and with stretches   Large cup UT/LS bilat  3 min  ea static and with stretches   IE/HEP JF                  re-eval

## 2023-10-16 ENCOUNTER — OFFICE VISIT (OUTPATIENT)
Dept: PHYSICAL THERAPY | Facility: CLINIC | Age: 53
End: 2023-10-16
Payer: COMMERCIAL

## 2023-10-16 DIAGNOSIS — G89.29 CHRONIC MIDLINE THORACIC BACK PAIN: ICD-10-CM

## 2023-10-16 DIAGNOSIS — M54.6 CHRONIC MIDLINE THORACIC BACK PAIN: ICD-10-CM

## 2023-10-16 DIAGNOSIS — M54.2 NECK PAIN: Primary | ICD-10-CM

## 2023-10-16 PROCEDURE — 97110 THERAPEUTIC EXERCISES: CPT | Performed by: PHYSICAL THERAPIST

## 2023-10-16 PROCEDURE — 97112 NEUROMUSCULAR REEDUCATION: CPT | Performed by: PHYSICAL THERAPIST

## 2023-10-16 NOTE — PROGRESS NOTES
Daily Note     Today's date: 10/16/2023  Patient name: Suzette Dickey  : 1970  MRN: [id number]  Referring provider: Valencia Butler DO  Dx:   Encounter Diagnosis     ICD-10-CM    1. Neck pain  M54.2       2. Chronic midline thoracic back pain  M54.6     G89.29                      Subjective: Patient reports that her symptoms continue to improve. But she continues to get paresthesias when she wakes up. Objective: See treatment diary below. Assessment: Patient demonstrates improving flexibility and DNF endurance. Patient able to tolerate all exercises as prescribed. Continue to progress as tolerated. Patient will continue to benefit from skilled PT in order to address impairments and improve function. Plan: Continue per plan of care. Progress treatment as tolerated.            Precautions: n/a    Manuals 9/8 9/20 9/27 9/29 10/2  10/9 10/11 10/16   Cervical PROM            re-eval     Cervical STM                                                     Neuro Re-Ed                 scap retraction iso                 Supine chin tuck 5"x10 5" x10 5"  x10 5"x10 5"x15   5"x15 5"x15   Supine chin tuck and lift 5"x10 5"x10 5"x10 5"x10 5"x15   5"x15 5"x15   Supine chin tuck and band pull apart Pink  5" hold,  2x10 Pink  5" hold  2x10 Pink  5" hold  2x10 Pink  5" hold,  2x10 Pink  5" hold,  2x10   Pink  5" hold,  2x10 Pink  5" hold  2x10   Rows BTB  3x10 BTB  3x10 Purple  3x10 Purple  3x10 Purple  3x10  Purple  3x10 Purple  3x10 Purple  3x10   Low rows BTB  3x10 BTB  3x10 Purple  3x10 Purple  3x10 Purple  3x10  Purple  3x10 Purple  3x10 Purple  3x10   W's Pink  3x10 Pink  3x10 Pink  3x10 Pink  3x10 Pink  3x10  Purple  3x10 Purple  3x10 Purple  3x10   Wall angels 2x10 2x10 2x10 2x10 2x10  2x10 2x10 2x10   SA foam roll 2x10 2x10 2x10 2x10 2x10  2x10 2x10 2x10   Prone I,T,Y          x15 ea   x15  ea x15 ea                     Ther Ex                 UBE 3 min  ea 3 min ea  3 min 3 min 3 min  3 min 3 min  ea 3/3   UT stretch With cupping  15"x5 With cupping  15"x5 With cupping  15"x5 With cupping  15"x5 With cupping  15"x5  15"x5 15"x5 15"x5   LS stretch With cupping  15"x5 With cupping  15"x5 With cupping  15"x5 With cupping  15"x5 With cupping  15"x5  15"x5 15"x5 15"x5   SNAG 10"x  10 10"x10 10"x10 10"x  10 10"x  10  10x10" 10"x  10 10x10"                                                         Ther Activity                                                     Gait Training                                                     Modalities                 Cupping  Large cup  UT/LS    Large  Cup  UT/LS     8 min Large  Cup  UT/LS     8 min Large cup UT/LS bilat  3 min each static and with stretches Large cup UT/LS bilat   3 min each static and with stretches   Large cup UT/LS bilat  3 min  ea static and with stretches Large cup  UT/LS  B/L  3 min ea  Static and with cervical stretches   IE/HEP            re-eval

## 2023-10-18 ENCOUNTER — TELEPHONE (OUTPATIENT)
Dept: FAMILY MEDICINE CLINIC | Facility: CLINIC | Age: 53
End: 2023-10-18

## 2023-10-18 ENCOUNTER — OFFICE VISIT (OUTPATIENT)
Dept: PHYSICAL THERAPY | Facility: CLINIC | Age: 53
End: 2023-10-18
Payer: COMMERCIAL

## 2023-10-18 DIAGNOSIS — M54.6 CHRONIC MIDLINE THORACIC BACK PAIN: ICD-10-CM

## 2023-10-18 DIAGNOSIS — G89.29 CHRONIC MIDLINE THORACIC BACK PAIN: ICD-10-CM

## 2023-10-18 DIAGNOSIS — M54.2 NECK PAIN: Primary | ICD-10-CM

## 2023-10-18 PROCEDURE — 97110 THERAPEUTIC EXERCISES: CPT

## 2023-10-18 PROCEDURE — 97112 NEUROMUSCULAR REEDUCATION: CPT

## 2023-10-18 NOTE — TELEPHONE ENCOUNTER
Patient dropped off paperwork. She knows you are on vacation all week. I put the envelope in your folder.

## 2023-10-18 NOTE — PROGRESS NOTES
Daily Note     Today's date: 10/18/2023  Patient name: Gilberto King  : 1970  MRN: [id number]  Referring provider: Nayeli Bullock DO  Dx:   Encounter Diagnosis     ICD-10-CM    1. Neck pain  M54.2       2. Chronic midline thoracic back pain  M54.6     G89.29                      Subjective: Patient reported the last few days she woke up with paraesthesia on R > L. No headaches recently. Objective: See treatment diary below. Assessment: Despite continued symptoms she is making good progress with DNF and postural strengthening. Good understanding of current program indicating proper carryover of HEP. Skilled PT will continue to help patient improve DNF and postural strength needed to promote improved posturing with sustained standing and seated positions. Plan: Continue per plan of care. Progress treatment as tolerated.            Precautions: n/a    Manuals 9/8 9/20 9/27 9/29 10/2  10/9 10/11 10/16 10/18   Cervical PROM            re-eval      Cervical STM                                                        Neuro Re-Ed                  scap retraction iso                  Supine chin tuck 5"x10 5" x10 5"  x10 5"x10 5"x15   5"x15 5"x15 5"x15   Supine chin tuck and lift 5"x10 5"x10 5"x10 5"x10 5"x15   5"x15 5"x15 5"x15   Supine chin tuck and band pull apart Pink  5" hold,  2x10 Pink  5" hold  2x10 Pink  5" hold  2x10 Pink  5" hold,  2x10 Pink  5" hold,  2x10   Pink  5" hold,  2x10 Pink  5" hold  2x10 Pink  5" hold,  2x10   Rows BTB  3x10 BTB  3x10 Purple  3x10 Purple  3x10 Purple  3x10  Purple  3x10 Purple  3x10 Purple  3x10 Purple  3x10   Low rows BTB  3x10 BTB  3x10 Purple  3x10 Purple  3x10 Purple  3x10  Purple  3x10 Purple  3x10 Purple  3x10 Purple  3x10   W's Pink  3x10 Pink  3x10 Pink  3x10 Pink  3x10 Pink  3x10  Purple  3x10 Purple  3x10 Purple  3x10 Purple  3x10   Wall angels 2x10 2x10 2x10 2x10 2x10  2x10 2x10 2x10 2x10   SA foam roll 2x10 2x10 2x10 2x10 2x10  2x10 2x10 2x10 2x10 Prone I,T,Y          x15 ea   x15  ea x15 ea x15  ea                      Ther Ex                  UBE 3 min  ea 3 min ea  3 min 3 min 3 min  3 min 3 min  ea 3/3 3 min  ea   UT stretch With cupping  15"x5 With cupping  15"x5 With cupping  15"x5 With cupping  15"x5 With cupping  15"x5  15"x5 15"x5 15"x5 15"x5  with cupping   LS stretch With cupping  15"x5 With cupping  15"x5 With cupping  15"x5 With cupping  15"x5 With cupping  15"x5  15"x5 15"x5 15"x5 15"x5   with cupping   SNAG 10"x  10 10"x10 10"x10 10"x  10 10"x  10  10x10" 10"x  10 10x10" 10"x  10 bilat                                                            Ther Activity                                                        Gait Training                                                        Modalities                  Cupping  Large cup  UT/LS    Large  Cup  UT/LS     8 min Large  Cup  UT/LS     8 min Large cup UT/LS bilat  3 min each static and with stretches Large cup UT/LS bilat   3 min each static and with stretches   Large cup UT/LS bilat  3 min  ea static and with stretches Large cup  UT/LS  B/L  3 min ea  Static and with cervical stretches Large cup UT/LS bilat -    Static   3 min ea and with stretches   IE/HEP            re-eval

## 2023-10-23 ENCOUNTER — OFFICE VISIT (OUTPATIENT)
Dept: PHYSICAL THERAPY | Facility: CLINIC | Age: 53
End: 2023-10-23
Payer: COMMERCIAL

## 2023-10-23 DIAGNOSIS — M54.6 CHRONIC MIDLINE THORACIC BACK PAIN: ICD-10-CM

## 2023-10-23 DIAGNOSIS — M54.2 NECK PAIN: Primary | ICD-10-CM

## 2023-10-23 DIAGNOSIS — G89.29 CHRONIC MIDLINE THORACIC BACK PAIN: ICD-10-CM

## 2023-10-23 PROCEDURE — 97112 NEUROMUSCULAR REEDUCATION: CPT | Performed by: PHYSICAL THERAPIST

## 2023-10-23 PROCEDURE — 97110 THERAPEUTIC EXERCISES: CPT | Performed by: PHYSICAL THERAPIST

## 2023-10-23 NOTE — PROGRESS NOTES
Daily Note     Today's date: 10/23/2023  Patient name: Gonzalez Araiza  : 1970  MRN: [id number]  Referring provider: Neisha Crawford DO  Dx:   Encounter Diagnosis     ICD-10-CM    1. Neck pain  M54.2       2. Chronic midline thoracic back pain  M54.6     G89.29                      Subjective: Patient reports that her shoulders are sore today from yard work over the weekend. Overall improving. Objective: See treatment diary below. Assessment: Patient was able to tolerate progression of resistance exercises without an increase in pain. They demonstrated muscular fatigue as expected with progression. Parascapular strength improving. Continue to progress as tolerated. Patient will continue to benefit from skilled PT in order to address impairments and improve function. Plan: Continue per plan of care. Progress treatment as tolerated.            Precautions: n/a    Manuals 9/29 10/2  10/9 10/11 10/16 10/18 10/23   Cervical PROM      re-eval       Cervical STM                                         Neuro Re-Ed             scap retraction iso             Supine chin tuck 5"x10 5"x15   5"x15 5"x15 5"x15 5"x15   Supine chin tuck and lift 5"x10 5"x15   5"x15 5"x15 5"x15 5"x15   Supine chin tuck and band pull apart Pink  5" hold,  2x10 Pink  5" hold,  2x10   Pink  5" hold,  2x10 Pink  5" hold  2x10 Pink  5" hold,  2x10 GTB  5" hold  2x10   Rows Purple  3x10 Purple  3x10  Purple  3x10 Purple  3x10 Purple  3x10 Purple  3x10 Purple  3x10   Low rows Purple  3x10 Purple  3x10  Purple  3x10 Purple  3x10 Purple  3x10 Purple  3x10 Purple  3x10   W's Pink  3x10 Pink  3x10  Purple  3x10 Purple  3x10 Purple  3x10 Purple  3x10 Purple  3x10   Wall angels 2x10 2x10  2x10 2x10 2x10 2x10 2x10   SA foam roll 2x10 2x10  2x10 2x10 2x10 2x10 2x10   Prone I,T,Y    x15 ea   x15  ea x15 ea x15  ea X15 ea                 Ther Ex             UBE 3 min 3 min  3 min 3 min  ea 3/3 3 min  ea 3 min   UT stretch With cupping  15"x5 With cupping  15"x5  15"x5 15"x5 15"x5 15"x5  with cupping 15"x5 with cupping   LS stretch With cupping  15"x5 With cupping  15"x5  15"x5 15"x5 15"x5 15"x5   with cupping 15"x5 with cupping   SNAG 10"x  10 10"x  10  10x10" 10"x  10 10x10" 10"x  10 bilat 10"x10 B/L                                             Ther Activity                                         Gait Training                                         Modalities             Cupping  Large cup UT/LS bilat  3 min each static and with stretches Large cup UT/LS bilat   3 min each static and with stretches   Large cup UT/LS bilat  3 min  ea static and with stretches Large cup  UT/LS  B/L  3 min ea  Static and with cervical stretches Large cup UT/LS bilat -    Static   3 min ea and with stretches    IE/HEP      re-eval

## 2023-10-25 ENCOUNTER — OFFICE VISIT (OUTPATIENT)
Dept: PHYSICAL THERAPY | Facility: CLINIC | Age: 53
End: 2023-10-25
Payer: COMMERCIAL

## 2023-10-25 DIAGNOSIS — M54.2 NECK PAIN: Primary | ICD-10-CM

## 2023-10-25 DIAGNOSIS — M54.6 CHRONIC MIDLINE THORACIC BACK PAIN: ICD-10-CM

## 2023-10-25 DIAGNOSIS — G89.29 CHRONIC MIDLINE THORACIC BACK PAIN: ICD-10-CM

## 2023-10-25 PROCEDURE — 97112 NEUROMUSCULAR REEDUCATION: CPT

## 2023-10-25 PROCEDURE — 97110 THERAPEUTIC EXERCISES: CPT

## 2023-10-25 NOTE — PROGRESS NOTES
Daily Note     Today's date: 10/25/2023  Patient name: Domenica Krueger  : 1970  MRN: [id number]  Referring provider: Marciano Vences DO  Dx:   Encounter Diagnosis     ICD-10-CM    1. Neck pain  M54.2       2. Chronic midline thoracic back pain  M54.6     G89.29                      Subjective: Patient reported continued improvement with PT, even seeing a decrease in numbness/tingling. Objective: See treatment diary below. Assessment: Continues to be making positive progress through skilled PT, helping to improve cervical ROM/flexibility and address DNF and postural weakness. Muscle fatigue remains as expected with more endurance-related strengthening. Plan: Continue per plan of care. Progress treatment as tolerated.            Precautions: n/a    Manuals 9/29 10/2  10/9 10/11 10/16 10/18 10/23 10/25   Cervical PROM      re-eval        Cervical STM                                            Neuro Re-Ed              scap retraction iso              Supine chin tuck 5"x10 5"x15   5"x15 5"x15 5"x15 5"x15 5"x15   Supine chin tuck and lift 5"x10 5"x15   5"x15 5"x15 5"x15 5"x15 5"x15   Supine chin tuck and band pull apart Pink  5" hold,  2x10 Pink  5" hold,  2x10   Pink  5" hold,  2x10 Pink  5" hold  2x10 Pink  5" hold,  2x10 GTB  5" hold  2x10 GTB  5" hold,  2x10   Rows Purple  3x10 Purple  3x10  Purple  3x10 Purple  3x10 Purple  3x10 Purple  3x10 Purple  3x10 Purple  3x10   Low rows Purple  3x10 Purple  3x10  Purple  3x10 Purple  3x10 Purple  3x10 Purple  3x10 Purple  3x10 Purple  3x10   W's Pink  3x10 Pink  3x10  Purple  3x10 Purple  3x10 Purple  3x10 Purple  3x10 Purple  3x10 Purple  3x10   Wall angels 2x10 2x10  2x10 2x10 2x10 2x10 2x10 2x10   SA foam roll 2x10 2x10  2x10 2x10 2x10 2x10 2x10 2x10   Prone I,T,Y    x15 ea   x15  ea x15 ea x15  ea X15 ea x15 ea                  Ther Ex              UBE 3 min 3 min  3 min 3 min  ea 3/3 3 min  ea 3 min 3 min   UT stretch With cupping  15"x5 With cupping  15"x5  15"x5 15"x5 15"x5 15"x5  with cupping 15"x5 with cupping 15"x5   with cupping   LS stretch With cupping  15"x5 With cupping  15"x5  15"x5 15"x5 15"x5 15"x5   with cupping 15"x5 with cupping 15"x5  with cupping   SNAG 10"x  10 10"x  10  10x10" 10"x  10 10x10" 10"x  10 bilat 10"x10 B/L 10"x  10 bilat                                                Ther Activity                                            Gait Training                                            Modalities              Cupping  Large cup UT/LS bilat  3 min each static and with stretches Large cup UT/LS bilat   3 min each static and with stretches   Large cup UT/LS bilat  3 min  ea static and with stretches Large cup  UT/LS  B/L  3 min ea  Static and with cervical stretches Large cup UT/LS bilat -    Static   3 min ea and with stretches  Large cup UT/LS bilat -    Static  3 min ea and with stretches   IE/HEP      re-eval

## 2023-10-30 ENCOUNTER — OFFICE VISIT (OUTPATIENT)
Dept: PHYSICAL THERAPY | Facility: CLINIC | Age: 53
End: 2023-10-30
Payer: COMMERCIAL

## 2023-10-30 DIAGNOSIS — M54.6 CHRONIC MIDLINE THORACIC BACK PAIN: ICD-10-CM

## 2023-10-30 DIAGNOSIS — G89.29 CHRONIC MIDLINE THORACIC BACK PAIN: ICD-10-CM

## 2023-10-30 DIAGNOSIS — M54.2 NECK PAIN: Primary | ICD-10-CM

## 2023-10-30 PROCEDURE — 97110 THERAPEUTIC EXERCISES: CPT | Performed by: PHYSICAL THERAPIST

## 2023-10-30 PROCEDURE — 97112 NEUROMUSCULAR REEDUCATION: CPT | Performed by: PHYSICAL THERAPIST

## 2023-10-30 NOTE — PROGRESS NOTES
Daily Note     Today's date: 10/30/2023  Patient name: Niecy Reinoso  : 1970  MRN: [id number]  Referring provider: Radha Maxwell DO  Dx:   Encounter Diagnosis     ICD-10-CM    1. Neck pain  M54.2       2. Chronic midline thoracic back pain  M54.6     G89.29                        Subjective: Patient reports that her neck and arm symptoms continue to improve. Objective: See treatment diary below. Assessment: Patient was able to tolerate progression of resistance exercises without an increase in pain. They demonstrated muscular fatigue as expected with progression. Function DNF and periscapular strength and endurance. Continue to progress as tolerated. Patient will continue to benefit from skilled PT in order to address impairments and improve function. Plan: Continue per plan of care. Progress treatment as tolerated.            Precautions: n/a    Manuals 9/29 10/2  10/9 10/11 10/16 10/18 10/23 10/25 10/30   Cervical PROM      re-eval         Cervical STM                                               Neuro Re-Ed               scap retraction iso               Supine chin tuck 5"x10 5"x15   5"x15 5"x15 5"x15 5"x15 5"x15 5"x15   Supine chin tuck and lift 5"x10 5"x15   5"x15 5"x15 5"x15 5"x15 5"x15 5"x15   Supine chin tuck and band pull apart Pink  5" hold,  2x10 Pink  5" hold,  2x10   Pink  5" hold,  2x10 Pink  5" hold  2x10 Pink  5" hold,  2x10 GTB  5" hold  2x10 GTB  5" hold,  2x10 GTB  5" hold  2x10   Rows Purple  3x10 Purple  3x10  Purple  3x10 Purple  3x10 Purple  3x10 Purple  3x10 Purple  3x10 Purple  3x10 Purple  3x10   Low rows Purple  3x10 Purple  3x10  Purple  3x10 Purple  3x10 Purple  3x10 Purple  3x10 Purple  3x10 Purple  3x10 Purple  3x10   W's Pink  3x10 Pink  3x10  Purple  3x10 Purple  3x10 Purple  3x10 Purple  3x10 Purple  3x10 Purple  3x10 Purple  3x10   Wall angels 2x10 2x10  2x10 2x10 2x10 2x10 2x10 2x10 2x10   SA foam roll 2x10 2x10  2x10 2x10 2x10 2x10 2x10 2x10 2x10 Prone I,T,Y    x15 ea   x15  ea x15 ea x15  ea X15 ea x15 ea x15ea                   Ther Ex               UBE 3 min 3 min  3 min 3 min  ea 3/3 3 min  ea 3 min 3 min 3 min   UT stretch With cupping  15"x5 With cupping  15"x5  15"x5 15"x5 15"x5 15"x5  with cupping 15"x5 with cupping 15"x5   with cupping 15"x5  With cupping   LS stretch With cupping  15"x5 With cupping  15"x5  15"x5 15"x5 15"x5 15"x5   with cupping 15"x5 with cupping 15"x5  with cupping 15"x5  With cupping   SNAG 10"x  10 10"x  10  10x10" 10"x  10 10x10" 10"x  10 bilat 10"x10 B/L 10"x  10 bilat 10x10"                                                   Ther Activity                                               Gait Training                                               Modalities               Cupping  Large cup UT/LS bilat  3 min each static and with stretches Large cup UT/LS bilat   3 min each static and with stretches   Large cup UT/LS bilat  3 min  ea static and with stretches Large cup  UT/LS  B/L  3 min ea  Static and with cervical stretches Large cup UT/LS bilat -    Static   3 min ea and with stretches  Large cup UT/LS bilat -    Static  3 min ea and with stretches Large cup UT/LS bilat -    Static  3 min ea and with stretches   IE/HEP      re-eval

## 2023-12-07 ENCOUNTER — CONSULT (OUTPATIENT)
Dept: PLASTIC SURGERY | Facility: CLINIC | Age: 53
End: 2023-12-07
Payer: COMMERCIAL

## 2023-12-07 VITALS
SYSTOLIC BLOOD PRESSURE: 130 MMHG | HEIGHT: 64 IN | DIASTOLIC BLOOD PRESSURE: 85 MMHG | WEIGHT: 194 LBS | BODY MASS INDEX: 33.12 KG/M2

## 2023-12-07 DIAGNOSIS — N62 LARGE BREASTS: ICD-10-CM

## 2023-12-07 PROCEDURE — 99204 OFFICE O/P NEW MOD 45 MIN: CPT | Performed by: STUDENT IN AN ORGANIZED HEALTH CARE EDUCATION/TRAINING PROGRAM

## 2023-12-11 NOTE — PROGRESS NOTES
Assessment and Plan:  Ms. Saravanan Horvath is a 48 y.o. female presenting with symptomatic macromastia    We discussed the procedure, risks, benefits, alternatives and postoperative instructions and expectations. I do think the patient is an excellent candidate for BBR. All patient's questions were answered and she voiced understanding. Booking form created. The patient also expressed interest in potentially a concurrent abdominoplasty. I would recommend a 23 hr obs if she does undergo both simultaneously. The office will provide a quote for the patient. History of Present Illness:   Ms. Saravanan Horvath is a 48 y.o. female presenting with  symptomatic macromastia. She reports having large, pendulous and heavy breasts for 30 years. She is currently a G cup. Her weight has been stable. She is active and eats a healthy diet, without significant sodium. She presents with her  today. She denies nicotine use. She is also considering abdominoplasty given her abdominal contour. Review of Systems:  A 12 point ROS was performed and negative except per HPI.     Past Medical History:  Past Medical History:   Diagnosis Date    Enlarged uterus 9/25/2017    Fibroid     uterine    GERD (gastroesophageal reflux disease)     resolved on way    Hot flashes     at hs    Irregular menses     heavy bleeding, painful    Right flank pain     [id number] RESOLVED    Uterine leiomyoma 11/23/2018    Added automatically from request for surgery [id number]    Wears contact lenses     and glasses       Past Surgical History:  Past Surgical History:   Procedure Laterality Date    COLONOSCOPY      CYSTOSCOPY N/A 12/4/2018    Procedure: CYSTOSCOPY;  Surgeon: Yo Horan DO;  Location: [facility];  Service: Gynecology    HYSTERECTOMY  2017    [place] LAPS [address] HYSTERECT 250 GM/< RMVL TUBE/OVAR Bilateral 12/4/2018    Procedure: HYSTERECTOMY LAPAROSCOPIC SUPRACERVICAL [facility]) AND REMOVAL OF BOTH TUBES;  Surgeon: Yo Horan DO; Location: [facility] OR;  Service: Gynecology    VAGINAL DELIVERY      WISDOM TOOTH EXTRACTION         Social History:  Social History     Tobacco Use    Smoking status: Never    Smokeless tobacco: Never   Vaping Use    Vaping Use: Never used   Substance Use Topics    Alcohol use: Yes     Comment: occa, liquor drink 4 a week    Drug use: No       Family History:  Family History   Problem Relation Age of Onset    Breast cancer Mother 76    No Known Problems Father     No Known Problems Daughter     Ovarian cancer Maternal Grandmother 80    Colon cancer Maternal Grandfather 80    Lung cancer Paternal Grandmother 80    No Known Problems Paternal Grandfather     No Known Problems Maternal Aunt     Breast cancer Maternal Aunt 40    No Known Problems Paternal Aunt        Allergies:  No Known Allergies    Medications:  Current Outpatient Medications on File Prior to Visit   Medication Sig Dispense Refill    ergocalciferol (VITAMIN D2) 50,000 units Take 1 capsule (50,000 Units total) by mouth once a week 12 capsule 0    ibuprofen (MOTRIN) 200 mg tablet Take 400 mg by mouth every 6 (six) hours as needed for mild pain      levothyroxine (Synthroid) 50 mcg tablet Take 1 tablet (50 mcg total) by mouth daily 90 tablet 1    sertraline (Zoloft) 50 mg tablet Take one half tablet daily x 1 week, then one daily 90 tablet 1     No current facility-administered medications on file prior to visit. Physical Examination:  /85   Ht 5' 4" (1.626 m)   Wt 88 kg (194 lb)   LMP 11/14/2018 (Approximate) Comment: Pt states it may have been the first two weeks in november. BMI 33.30 kg/m²   Estimated body mass index is 33.3 kg/m² as calculated from the following:    Height as of this encounter: 5' 4" (1.626 m). Weight as of this encounter: 88 kg (194 lb).   General: NAD, well appearing, AAOx3  HEENT: NCAT, EOMI, MMM, supple  Resp: Nonlabored  Heart: RRR  Abdomen: Soft, moderate intra-abdominal/visceral fat with diastasis, no palpable hernia, moderate mons ptotis and excess skin and lipodystrophy  Extremities/MSK: no LE edema, no obvious deficits in ROM  Neuro: grossly intact with no obvious deficits  Skin: no obvious lesions or rashes  Breast: no palpable mass, no palpable axillary lymphadenopathy,  grade II ptosis, heavy/pendulous breasts with erythema of the inframammary fold, bra strap grooving      Josefina Boyd, DO  Plastic and Reconstructive Surgery

## 2023-12-15 ENCOUNTER — OFFICE VISIT (OUTPATIENT)
Dept: OBGYN CLINIC | Facility: MEDICAL CENTER | Age: 53
End: 2023-12-15
Payer: COMMERCIAL

## 2023-12-15 ENCOUNTER — APPOINTMENT (OUTPATIENT)
Dept: LAB | Facility: CLINIC | Age: 53
End: 2023-12-15
Payer: COMMERCIAL

## 2023-12-15 VITALS
HEIGHT: 64 IN | BODY MASS INDEX: 34.09 KG/M2 | DIASTOLIC BLOOD PRESSURE: 68 MMHG | SYSTOLIC BLOOD PRESSURE: 100 MMHG | WEIGHT: 199.7 LBS

## 2023-12-15 DIAGNOSIS — E03.9 ACQUIRED HYPOTHYROIDISM: ICD-10-CM

## 2023-12-15 DIAGNOSIS — R73.9 ELEVATED BLOOD SUGAR: ICD-10-CM

## 2023-12-15 DIAGNOSIS — Z12.31 ENCOUNTER FOR SCREENING MAMMOGRAM FOR MALIGNANT NEOPLASM OF BREAST: ICD-10-CM

## 2023-12-15 DIAGNOSIS — Z01.419 WOMEN'S ANNUAL ROUTINE GYNECOLOGICAL EXAMINATION: Primary | ICD-10-CM

## 2023-12-15 LAB
EST. AVERAGE GLUCOSE BLD GHB EST-MCNC: 126 MG/DL
HBA1C MFR BLD: 6 %
TSH SERPL DL<=0.05 MIU/L-ACNC: 2.76 UIU/ML (ref 0.45–4.5)

## 2023-12-15 PROCEDURE — 36415 COLL VENOUS BLD VENIPUNCTURE: CPT

## 2023-12-15 PROCEDURE — 83036 HEMOGLOBIN GLYCOSYLATED A1C: CPT

## 2023-12-15 PROCEDURE — 84443 ASSAY THYROID STIM HORMONE: CPT

## 2023-12-15 PROCEDURE — S0612 ANNUAL GYNECOLOGICAL EXAMINA: HCPCS | Performed by: OBSTETRICS & GYNECOLOGY

## 2023-12-15 NOTE — PROGRESS NOTES
A/P    1.  Annual exam    Last PAP - 10/14/21- neg neg   Next due 2026   Scheduling of pap discussed in detail      Mammogram - last 1/23/23- #1 (10%)   Next do 2024   Self breast exams discussed     Colonoscopy - 4/5/2016- x 10       53 y.o.,Patient's last menstrual period was 11/14/2018 (approximate).  C/O no gyn concerns doing well        Past medical / social / surgical / family history reviewed and updated   Medication and allergies discussed in detail and updated     Review of Systems - History obtained from chart review and the patient  General ROS: negative  Psychological ROS: negative  Ophthalmic ROS: negative  ENT ROS: negative  Allergy and Immunology ROS: negative  Hematological and Lymphatic ROS: negative  Endocrine ROS: negative  Breast ROS: negative for breast lumps  Respiratory ROS: no cough, shortness of breath, or wheezing  Cardiovascular ROS: no chest pain or dyspnea on exertion  Gastrointestinal ROS: no abdominal pain, change in bowel habits, or black or bloody stools  Genito-Urinary ROS: no dysuria, trouble voiding, or hematuria  Musculoskeletal ROS: negative  Neurological ROS: no TIA or stroke symptoms  Dermatological ROS: negative        Physical Exam  Vitals reviewed. Exam conducted with a chaperone present.   Constitutional:       Appearance: She is well-developed.   Neck:      Thyroid: No thyromegaly.   Cardiovascular:      Rate and Rhythm: Normal rate.      Heart sounds: Normal heart sounds.   Pulmonary:      Effort: Pulmonary effort is normal. No accessory muscle usage or respiratory distress.      Breath sounds: Normal air entry.   Chest:      Chest wall: No tenderness.   Breasts:     Breasts are symmetrical.      Right: No inverted nipple, mass or tenderness.      Left: No inverted nipple, mass or tenderness.   Abdominal:      General: There is no distension.      Palpations: Abdomen is soft.      Tenderness: There is no abdominal tenderness. There is no right CVA tenderness, left CVA  tenderness, guarding or rebound.   Genitourinary:     General: Normal vulva.      Exam position: Lithotomy position.      Labia:         Right: No rash, tenderness, lesion or injury.         Left: No rash, tenderness, lesion or injury.       Vagina: Normal. No foreign body. No vaginal discharge or bleeding.      Cervix: Normal.      Uterus: Normal. Not enlarged and not fixed.       Adnexa: Right adnexa normal and left adnexa normal.        Right: No mass, tenderness or fullness.          Left: No mass, tenderness or fullness.        Rectum: No external hemorrhoid.   Musculoskeletal:      Cervical back: Normal range of motion.   Lymphadenopathy:      Cervical: No cervical adenopathy.      Upper Body:      Right upper body: No supraclavicular adenopathy.      Left upper body: No supraclavicular adenopathy.   Neurological:      Mental Status: She is alert and oriented to person, place, and time.   Psychiatric:         Speech: Speech normal.         Behavior: Behavior normal.         Thought Content: Thought content normal.         Judgment: Judgment normal.

## 2024-01-02 ENCOUNTER — PREP FOR PROCEDURE (OUTPATIENT)
Dept: PLASTIC SURGERY | Facility: CLINIC | Age: 54
End: 2024-01-02

## 2024-01-02 DIAGNOSIS — N62 MACROMASTIA: Primary | ICD-10-CM

## 2024-01-11 DIAGNOSIS — R23.2 HOT FLASHES: ICD-10-CM

## 2024-01-11 DIAGNOSIS — F51.01 PRIMARY INSOMNIA: ICD-10-CM

## 2024-01-11 RX ORDER — SERTRALINE HYDROCHLORIDE 100 MG/1
TABLET, FILM COATED ORAL
Qty: 90 TABLET | Refills: 1 | Status: SHIPPED | OUTPATIENT
Start: 2024-01-11

## 2024-02-12 ENCOUNTER — HOSPITAL ENCOUNTER (OUTPATIENT)
Dept: MAMMOGRAPHY | Facility: MEDICAL CENTER | Age: 54
Discharge: HOME | End: 2024-02-12
Payer: COMMERCIAL

## 2024-02-12 VITALS — WEIGHT: 199.74 LBS | HEIGHT: 64 IN | BODY MASS INDEX: 34.1 KG/M2

## 2024-02-12 DIAGNOSIS — Z12.31 ENCOUNTER FOR SCREENING MAMMOGRAM FOR MALIGNANT NEOPLASM OF BREAST: ICD-10-CM

## 2024-02-12 PROCEDURE — 77063 BREAST TOMOSYNTHESIS BI: CPT

## 2024-02-12 PROCEDURE — 77067 SCR MAMMO BI INCL CAD: CPT

## 2024-02-19 ENCOUNTER — OFFICE VISIT (OUTPATIENT)
Dept: PLASTIC SURGERY | Facility: CLINIC | Age: 54
End: 2024-02-19
Payer: COMMERCIAL

## 2024-02-19 ENCOUNTER — COSMETIC (OUTPATIENT)
Dept: PLASTIC SURGERY | Facility: CLINIC | Age: 54
End: 2024-02-19

## 2024-02-19 DIAGNOSIS — N62 LARGE BREASTS: Primary | ICD-10-CM

## 2024-02-19 DIAGNOSIS — Z41.1 ELECTIVE PROCEDURE FOR UNACCEPTABLE COSMETIC APPEARANCE: Primary | ICD-10-CM

## 2024-02-19 PROCEDURE — CP99024 PR-OP COSMETIC EVALUATION-PROLONGED: Performed by: STUDENT IN AN ORGANIZED HEALTH CARE EDUCATION/TRAINING PROGRAM

## 2024-02-19 PROCEDURE — RECHECK: Performed by: STUDENT IN AN ORGANIZED HEALTH CARE EDUCATION/TRAINING PROGRAM

## 2024-02-19 PROCEDURE — 99214 OFFICE O/P EST MOD 30 MIN: CPT | Performed by: STUDENT IN AN ORGANIZED HEALTH CARE EDUCATION/TRAINING PROGRAM

## 2024-02-20 NOTE — PROGRESS NOTES
Assessment and Plan:  Ms. Thomas is a 53 y.o. female presenting with symptomatic macromastia, unacceptable cosmetic appearance of back/flanks, abdomen    We discussed the procedure, risks, benefits, alternatives and postoperative instructions and expectations.  Informed consent obtained.  The patient understands that her BMI does portend to delayed wound healing.  She is being mindful of activity, diet and sodium in the perioperative period.  We discussed the approach to pain management postop. She will plan to spend 1 night for observation given the amount of surgery being performed concurrently.   All patient's questions were answered and she voiced understanding.    History of Present Illness:   Ms. Thomas is a 53 y.o. female presenting preop for BBR, liposuction, abdominoplasty.        Review of Systems:  A 12 point ROS was performed and negative except per HPI.    Past Medical History:  Past Medical History:   Diagnosis Date    Enlarged uterus 9/25/2017    Fibroid     uterine    GERD (gastroesophageal reflux disease)     resolved on way    Hot flashes     at hs    Irregular menses     heavy bleeding, painful    Right flank pain     [id number] RESOLVED    Uterine leiomyoma 11/23/2018    Added automatically from request for surgery [id number]    Wears contact lenses     and glasses       Past Surgical History:  Past Surgical History:   Procedure Laterality Date    COLONOSCOPY      CYSTOSCOPY N/A 12/4/2018    Procedure: CYSTOSCOPY;  Surgeon: Rohit Turk DO;  Location: [facility];  Service: Gynecology    HYSTERECTOMY  2017    [place] LAPS SUPRACRV HYSTERECT 250 GM/< RMVL TUBE/OVAR Bilateral 12/4/2018    Procedure: HYSTERECTOMY LAPAROSCOPIC SUPRACERVICAL (LSH) AND REMOVAL OF BOTH TUBES;  Surgeon: Rohit Turk DO;  Location: [facility];  Service: Gynecology    VAGINAL DELIVERY      WISDOM TOOTH EXTRACTION         Social History:  Social History     Tobacco Use    Smoking status: Former     Types: Cigarettes     "Smokeless tobacco: Never   Vaping Use    Vaping status: Never Used   Substance Use Topics    Alcohol use: Yes     Comment: occa, liquor drink 4 a week    Drug use: No       Family History:  Family History   Problem Relation Age of Onset    Breast cancer Mother 68    No Known Problems Father     No Known Problems Daughter     Ovarian cancer Maternal Grandmother 84    Colon cancer Maternal Grandfather 84    Lung cancer Paternal Grandmother 88    No Known Problems Paternal Grandfather     No Known Problems Maternal Aunt     Breast cancer Maternal Aunt 40    No Known Problems Paternal Aunt        Allergies:  No Known Allergies    Medications:  Current Outpatient Medications on File Prior to Visit   Medication Sig Dispense Refill    ergocalciferol (VITAMIN D2) 50,000 units Take 1 capsule (50,000 Units total) by mouth once a week 12 capsule 0    ibuprofen (MOTRIN) 200 mg tablet Take 400 mg by mouth every 6 (six) hours as needed for mild pain      levothyroxine (Synthroid) 50 mcg tablet Take 1 tablet (50 mcg total) by mouth daily 90 tablet 1    sertraline (Zoloft) 100 mg tablet One tablet daily. 90 tablet 1     No current facility-administered medications on file prior to visit.         Physical Examination:  LMP 11/14/2018 (Approximate) Comment: Pt states it may have been the first two weeks in november.   Estimated body mass index is 34.28 kg/m² as calculated from the following:    Height as of 2/12/24: 5' 4\" (1.626 m).    Weight as of 2/12/24: 90.6 kg (199 lb 11.8 oz).  General: NAD, well appearing, AAOx3  HEENT: NCAT, EOMI, MMM, supple  Resp: Nonlabored  Heart: RRR  Abdomen: Soft, moderate distension, dense soft tissue and lipodystrophy of abdominal wall, flanks and back, no palpable hernias, + diastasis  Extremities/MSK: no LE edema, no obvious deficits in ROM  Neuro: grossly intact with no obvious deficits  Skin: no obvious lesions or rashes  Breast: no palpable mass, no palpable axillary lymphadenopathy, grade " II/III ptosis, N/IMF 17      Josefina Boyd, DO  Plastic and Reconstructive Surgery

## 2024-02-20 NOTE — H&P (VIEW-ONLY)
Assessment and Plan:  Ms. Thomas is a 53 y.o. female presenting with symptomatic macromastia, unacceptable cosmetic appearance of back/flanks, abdomen    We discussed the procedure, risks, benefits, alternatives and postoperative instructions and expectations.  Informed consent obtained.  The patient understands that her BMI does portend to delayed wound healing.  She is being mindful of activity, diet and sodium in the perioperative period.  We discussed the approach to pain management postop. She will plan to spend 1 night for observation given the amount of surgery being performed concurrently.   All patient's questions were answered and she voiced understanding.    History of Present Illness:   Ms. Thomas is a 53 y.o. female presenting preop for BBR, liposuction, abdominoplasty.        Review of Systems:  A 12 point ROS was performed and negative except per HPI.    Past Medical History:  Past Medical History:   Diagnosis Date    Enlarged uterus 9/25/2017    Fibroid     uterine    GERD (gastroesophageal reflux disease)     resolved on way    Hot flashes     at hs    Irregular menses     heavy bleeding, painful    Right flank pain     [id number] RESOLVED    Uterine leiomyoma 11/23/2018    Added automatically from request for surgery [id number]    Wears contact lenses     and glasses       Past Surgical History:  Past Surgical History:   Procedure Laterality Date    COLONOSCOPY      CYSTOSCOPY N/A 12/4/2018    Procedure: CYSTOSCOPY;  Surgeon: Rohit Turk DO;  Location: [facility];  Service: Gynecology    HYSTERECTOMY  2017    [place] LAPS SUPRACRV HYSTERECT 250 GM/< RMVL TUBE/OVAR Bilateral 12/4/2018    Procedure: HYSTERECTOMY LAPAROSCOPIC SUPRACERVICAL (LSH) AND REMOVAL OF BOTH TUBES;  Surgeon: Rohit Turk DO;  Location: [facility];  Service: Gynecology    VAGINAL DELIVERY      WISDOM TOOTH EXTRACTION         Social History:  Social History     Tobacco Use    Smoking status: Former     Types: Cigarettes     "Smokeless tobacco: Never   Vaping Use    Vaping status: Never Used   Substance Use Topics    Alcohol use: Yes     Comment: occa, liquor drink 4 a week    Drug use: No       Family History:  Family History   Problem Relation Age of Onset    Breast cancer Mother 68    No Known Problems Father     No Known Problems Daughter     Ovarian cancer Maternal Grandmother 84    Colon cancer Maternal Grandfather 84    Lung cancer Paternal Grandmother 88    No Known Problems Paternal Grandfather     No Known Problems Maternal Aunt     Breast cancer Maternal Aunt 40    No Known Problems Paternal Aunt        Allergies:  No Known Allergies    Medications:  Current Outpatient Medications on File Prior to Visit   Medication Sig Dispense Refill    ergocalciferol (VITAMIN D2) 50,000 units Take 1 capsule (50,000 Units total) by mouth once a week 12 capsule 0    ibuprofen (MOTRIN) 200 mg tablet Take 400 mg by mouth every 6 (six) hours as needed for mild pain      levothyroxine (Synthroid) 50 mcg tablet Take 1 tablet (50 mcg total) by mouth daily 90 tablet 1    sertraline (Zoloft) 100 mg tablet One tablet daily. 90 tablet 1     No current facility-administered medications on file prior to visit.         Physical Examination:  LMP 11/14/2018 (Approximate) Comment: Pt states it may have been the first two weeks in november.   Estimated body mass index is 34.28 kg/m² as calculated from the following:    Height as of 2/12/24: 5' 4\" (1.626 m).    Weight as of 2/12/24: 90.6 kg (199 lb 11.8 oz).  General: NAD, well appearing, AAOx3  HEENT: NCAT, EOMI, MMM, supple  Resp: Nonlabored  Heart: RRR  Abdomen: Soft, moderate distension, dense soft tissue and lipodystrophy of abdominal wall, flanks and back, no palpable hernias, + diastasis  Extremities/MSK: no LE edema, no obvious deficits in ROM  Neuro: grossly intact with no obvious deficits  Skin: no obvious lesions or rashes  Breast: no palpable mass, no palpable axillary lymphadenopathy, grade " II/III ptosis, N/IMF 17      Josefina Boyd, DO  Plastic and Reconstructive Surgery

## 2024-02-20 NOTE — PROGRESS NOTES
Assessment and Plan:  Ms. Thomas is a 53 y.o. female presenting with symptomatic macromastia, unacceptable cosmetic appearance of back/flanks, abdomen    We discussed the procedure, risks, benefits, alternatives and postoperative instructions and expectations.  Informed consent obtained.  The patient understands that her BMI does portend to delayed wound healing.  She is being mindful of activity, diet and sodium in the perioperative period.  We discussed the approach to pain management postop. She will plan to spend 1 night for observation given the amount of surgery being performed concurrently.   All patient's questions were answered and she voiced understanding.    History of Present Illness:   Ms. Thomas is a 53 y.o. female presenting preop for BBR, liposuction, abdominoplasty.        Review of Systems:  A 12 point ROS was performed and negative except per HPI.    Past Medical History:  Past Medical History:   Diagnosis Date    Enlarged uterus 9/25/2017    Fibroid     uterine    GERD (gastroesophageal reflux disease)     resolved on way    Hot flashes     at hs    Irregular menses     heavy bleeding, painful    Right flank pain     [id number] RESOLVED    Uterine leiomyoma 11/23/2018    Added automatically from request for surgery [id number]    Wears contact lenses     and glasses       Past Surgical History:  Past Surgical History:   Procedure Laterality Date    COLONOSCOPY      CYSTOSCOPY N/A 12/4/2018    Procedure: CYSTOSCOPY;  Surgeon: Rohit Turk DO;  Location: [facility];  Service: Gynecology    HYSTERECTOMY  2017    [place] LAPS SUPRACRV HYSTERECT 250 GM/< RMVL TUBE/OVAR Bilateral 12/4/2018    Procedure: HYSTERECTOMY LAPAROSCOPIC SUPRACERVICAL (LSH) AND REMOVAL OF BOTH TUBES;  Surgeon: Rohit Turk DO;  Location: [facility];  Service: Gynecology    VAGINAL DELIVERY      WISDOM TOOTH EXTRACTION         Social History:  Social History     Tobacco Use    Smoking status: Former     Types: Cigarettes     "Smokeless tobacco: Never   Vaping Use    Vaping status: Never Used   Substance Use Topics    Alcohol use: Yes     Comment: occa, liquor drink 4 a week    Drug use: No       Family History:  Family History   Problem Relation Age of Onset    Breast cancer Mother 68    No Known Problems Father     No Known Problems Daughter     Ovarian cancer Maternal Grandmother 84    Colon cancer Maternal Grandfather 84    Lung cancer Paternal Grandmother 88    No Known Problems Paternal Grandfather     No Known Problems Maternal Aunt     Breast cancer Maternal Aunt 40    No Known Problems Paternal Aunt        Allergies:  No Known Allergies    Medications:  Current Outpatient Medications on File Prior to Visit   Medication Sig Dispense Refill    ergocalciferol (VITAMIN D2) 50,000 units Take 1 capsule (50,000 Units total) by mouth once a week 12 capsule 0    ibuprofen (MOTRIN) 200 mg tablet Take 400 mg by mouth every 6 (six) hours as needed for mild pain      levothyroxine (Synthroid) 50 mcg tablet Take 1 tablet (50 mcg total) by mouth daily 90 tablet 1    sertraline (Zoloft) 100 mg tablet One tablet daily. 90 tablet 1     No current facility-administered medications on file prior to visit.         Physical Examination:  LMP 11/14/2018 (Approximate) Comment: Pt states it may have been the first two weeks in november.   Estimated body mass index is 34.28 kg/m² as calculated from the following:    Height as of 2/12/24: 5' 4\" (1.626 m).    Weight as of 2/12/24: 90.6 kg (199 lb 11.8 oz).  General: NAD, well appearing, AAOx3  HEENT: NCAT, EOMI, MMM, supple  Resp: Nonlabored  Heart: RRR  Abdomen: Soft, moderate distension, dense soft tissue and lipodystrophy of abdominal wall, flanks and back, no palpable hernias, + diastasis  Extremities/MSK: no LE edema, no obvious deficits in ROM  Neuro: grossly intact with no obvious deficits  Skin: no obvious lesions or rashes  Breast: no palpable mass, no palpable axillary lymphadenopathy, grade " II/III ptosis, N/IMF 17      Josefina Boyd, DO  Plastic and Reconstructive Surgery

## 2024-02-29 ENCOUNTER — APPOINTMENT (OUTPATIENT)
Dept: LAB | Facility: CLINIC | Age: 54
End: 2024-02-29
Payer: COMMERCIAL

## 2024-02-29 DIAGNOSIS — N62 MACROMASTIA: ICD-10-CM

## 2024-02-29 LAB
ANION GAP SERPL CALCULATED.3IONS-SCNC: 10 MMOL/L
BASOPHILS # BLD AUTO: 0.05 THOUSANDS/ÂΜL (ref 0–0.1)
BASOPHILS NFR BLD AUTO: 1 % (ref 0–1)
BUN SERPL-MCNC: 14 MG/DL (ref 5–25)
CALCIUM SERPL-MCNC: 9.8 MG/DL (ref 8.4–10.2)
CHLORIDE SERPL-SCNC: 101 MMOL/L (ref 96–108)
CO2 SERPL-SCNC: 27 MMOL/L (ref 21–32)
CREAT SERPL-MCNC: 0.74 MG/DL (ref 0.6–1.3)
EOSINOPHIL # BLD AUTO: 0.36 THOUSAND/ÂΜL (ref 0–0.61)
EOSINOPHIL NFR BLD AUTO: 4 % (ref 0–6)
ERYTHROCYTE [DISTWIDTH] IN BLOOD BY AUTOMATED COUNT: 14.3 % (ref 11.6–15.1)
GFR SERPL CREATININE-BSD FRML MDRD: 94 ML/MIN/1.73SQ M
GLUCOSE P FAST SERPL-MCNC: 97 MG/DL (ref 65–99)
HCT VFR BLD AUTO: 42.2 % (ref 34.8–46.1)
HGB BLD-MCNC: 13.4 G/DL (ref 11.5–15.4)
IMM GRANULOCYTES # BLD AUTO: 0.03 THOUSAND/UL (ref 0–0.2)
IMM GRANULOCYTES NFR BLD AUTO: 0 % (ref 0–2)
LYMPHOCYTES # BLD AUTO: 2.71 THOUSANDS/ÂΜL (ref 0.6–4.47)
LYMPHOCYTES NFR BLD AUTO: 29 % (ref 14–44)
MCH RBC QN AUTO: 31 PG (ref 26.8–34.3)
MCHC RBC AUTO-ENTMCNC: 31.8 G/DL (ref 31.4–37.4)
MCV RBC AUTO: 98 FL (ref 82–98)
MONOCYTES # BLD AUTO: 0.98 THOUSAND/ÂΜL (ref 0.17–1.22)
MONOCYTES NFR BLD AUTO: 11 % (ref 4–12)
NEUTROPHILS # BLD AUTO: 5.13 THOUSANDS/ÂΜL (ref 1.85–7.62)
NEUTS SEG NFR BLD AUTO: 55 % (ref 43–75)
NRBC BLD AUTO-RTO: 0 /100 WBCS
PLATELET # BLD AUTO: 286 THOUSANDS/UL (ref 149–390)
PMV BLD AUTO: 9.9 FL (ref 8.9–12.7)
POTASSIUM SERPL-SCNC: 3.9 MMOL/L (ref 3.5–5.3)
RBC # BLD AUTO: 4.32 MILLION/UL (ref 3.81–5.12)
SODIUM SERPL-SCNC: 138 MMOL/L (ref 135–147)
WBC # BLD AUTO: 9.26 THOUSAND/UL (ref 4.31–10.16)

## 2024-02-29 PROCEDURE — 36415 COLL VENOUS BLD VENIPUNCTURE: CPT

## 2024-02-29 PROCEDURE — 80048 BASIC METABOLIC PNL TOTAL CA: CPT

## 2024-02-29 PROCEDURE — 85025 COMPLETE CBC W/AUTO DIFF WBC: CPT

## 2024-03-13 ENCOUNTER — ANESTHESIA EVENT (OUTPATIENT)
Dept: PERIOP | Facility: HOSPITAL | Age: 54
End: 2024-03-13
Payer: SELF-PAY

## 2024-03-13 ENCOUNTER — TELEPHONE (OUTPATIENT)
Age: 54
End: 2024-03-13

## 2024-03-13 DIAGNOSIS — Z41.1 ELECTIVE PROCEDURE FOR UNACCEPTABLE COSMETIC APPEARANCE: Primary | ICD-10-CM

## 2024-03-13 RX ORDER — SENNOSIDES 8.6 MG
650 CAPSULE ORAL EVERY 6 HOURS
Qty: 20 TABLET | Refills: 0 | Status: SHIPPED | OUTPATIENT
Start: 2024-03-13 | End: 2024-03-18

## 2024-03-13 RX ORDER — MULTIVIT WITH MINERALS/LUTEIN
1000 TABLET ORAL DAILY
COMMUNITY

## 2024-03-13 RX ORDER — IBUPROFEN 800 MG/1
800 TABLET ORAL EVERY 8 HOURS PRN
Qty: 15 TABLET | Refills: 0 | Status: SHIPPED | OUTPATIENT
Start: 2024-03-13

## 2024-03-13 RX ORDER — TRAMADOL HYDROCHLORIDE 50 MG/1
50 TABLET ORAL EVERY 6 HOURS PRN
Qty: 20 TABLET | Refills: 0 | Status: SHIPPED | OUTPATIENT
Start: 2024-03-13

## 2024-03-13 RX ORDER — GABAPENTIN 300 MG/1
300 CAPSULE ORAL 3 TIMES DAILY
Qty: 15 CAPSULE | Refills: 0 | Status: SHIPPED | OUTPATIENT
Start: 2024-03-13 | End: 2024-03-18

## 2024-03-13 RX ORDER — MELATONIN
1000 DAILY
COMMUNITY

## 2024-03-13 RX ORDER — OXYCODONE HYDROCHLORIDE 5 MG/1
5 TABLET ORAL EVERY 6 HOURS PRN
Qty: 10 TABLET | Refills: 0 | Status: SHIPPED | OUTPATIENT
Start: 2024-03-13

## 2024-03-13 NOTE — PRE-PROCEDURE INSTRUCTIONS
Pre-Surgery Instructions:   Medication Instructions    Ascorbic Acid (vitamin C) 1000 MG tablet Stop taking 1 day prior to surgery.    cholecalciferol (VITAMIN D3) 1,000 units tablet Stop taking 1 day prior to surgery.    ibuprofen (MOTRIN) 200 mg tablet Stop taking 1 day prior to surgery.    levothyroxine (Synthroid) 50 mcg tablet Take day of surgery.    sertraline (Zoloft) 100 mg tablet Take night before surgery   Medication instructions for day surgery reviewed. Please use only a sip of water to take your instructed medications. Avoid all over the counter vitamins, supplements and NSAIDS for one week prior to surgery per anesthesia guidelines. Tylenol is ok to take as needed.     You will receive a call one business day prior to surgery with an arrival time and hospital directions. If your surgery is scheduled on a Monday, the hospital will be calling you on the Friday prior to your surgery. If you have not heard from anyone by 8pm, please call the hospital supervisor through the hospital  at [phone]. ([place] [phone] or [place] [phone]).    Do not eat or drink anything after midnight the night before your surgery, including candy, mints, lifesavers, or chewing gum. Do not drink alcohol 24hrs before your surgery. Try not to smoke at least 24hrs before your surgery.       Follow the pre surgery showering instructions as listed in the “My Surgical Experience Booklet” or otherwise provided by your surgeon's office. Do not use a blade to shave the surgical area 1 week before surgery. It is okay to use a clean electric clippers up to 24 hours before surgery. Do not apply any lotions, creams, including makeup, cologne, deodorant, or perfumes after showering on the day of your surgery. Do not use dry shampoo, hair spray, hair gel, or any type of hair products.     No contact lenses, eye make-up, or artificial eyelashes. Remove nail polish, including gel polish, and any artificial, gel, or  acrylic nails if possible. Remove all jewelry including rings and body piercing jewelry.     Wear causal clothing that is easy to take on and off. Consider your type of surgery.    Keep any valuables, jewelry, piercings at home. Please bring any specially ordered equipment (sling, braces) if indicated.    Arrange for a responsible person to drive you to and from the hospital on the day of your surgery. Please confirm the visitor policy for the day of your procedure when you receive your phone call with an arrival time.     Call the surgeon's office with any new illnesses, exposures, or additional questions prior to surgery.    Please reference your “My Surgical Experience Booklet” for additional information to prepare for your upcoming surgery.

## 2024-03-13 NOTE — TELEPHONE ENCOUNTER
Rec'd call from patient stating that she's having surgery tomorrow by Dr. Boyd and she just spoke to pharmacy and they haven't yet received any scripts.    Checked chart. Informed patient that 5 scripts were sent to pharmacy (confirmed pharmacy - [facility]). Stated that pharmacy confirmed receipt at 12:21 pm today.    Patient thanked me for my time. She'll return call to pharmacy.

## 2024-03-14 ENCOUNTER — HOSPITAL ENCOUNTER (OUTPATIENT)
Facility: HOSPITAL | Age: 54
Setting detail: OUTPATIENT SURGERY
Discharge: HOME | End: 2024-03-15
Attending: STUDENT IN AN ORGANIZED HEALTH CARE EDUCATION/TRAINING PROGRAM | Admitting: STUDENT IN AN ORGANIZED HEALTH CARE EDUCATION/TRAINING PROGRAM
Payer: SELF-PAY

## 2024-03-14 ENCOUNTER — ANESTHESIA (OUTPATIENT)
Dept: PERIOP | Facility: HOSPITAL | Age: 54
End: 2024-03-14
Payer: SELF-PAY

## 2024-03-14 DIAGNOSIS — N62 MACROMASTIA: ICD-10-CM

## 2024-03-14 PROCEDURE — 88305 TISSUE EXAM BY PATHOLOGIST: CPT | Performed by: PATHOLOGY

## 2024-03-14 PROCEDURE — 19318 BREAST REDUCTION: CPT | Performed by: PHYSICIAN ASSISTANT

## 2024-03-14 PROCEDURE — 19318 BREAST REDUCTION: CPT | Performed by: STUDENT IN AN ORGANIZED HEALTH CARE EDUCATION/TRAINING PROGRAM

## 2024-03-14 PROCEDURE — 99024 POSTOP FOLLOW-UP VISIT: CPT | Performed by: STUDENT IN AN ORGANIZED HEALTH CARE EDUCATION/TRAINING PROGRAM

## 2024-03-14 PROCEDURE — C9290 INJ, BUPIVACAINE LIPOSOME: HCPCS | Performed by: PHYSICIAN ASSISTANT

## 2024-03-14 RX ORDER — DEXAMETHASONE SODIUM PHOSPHATE 10 MG/ML
INJECTION, SOLUTION INTRAMUSCULAR; INTRAVENOUS AS NEEDED
Status: DISCONTINUED | OUTPATIENT
Start: 2024-03-14 | End: 2024-03-14

## 2024-03-14 RX ORDER — HYDROMORPHONE HYDROCHLORIDE 1 MG/ML
INJECTION, SOLUTION INTRAMUSCULAR; INTRAVENOUS; SUBCUTANEOUS AS NEEDED
Status: DISCONTINUED | OUTPATIENT
Start: 2024-03-14 | End: 2024-03-14

## 2024-03-14 RX ORDER — ONDANSETRON 2 MG/ML
INJECTION INTRAMUSCULAR; INTRAVENOUS AS NEEDED
Status: DISCONTINUED | OUTPATIENT
Start: 2024-03-14 | End: 2024-03-14

## 2024-03-14 RX ORDER — SERTRALINE HYDROCHLORIDE 100 MG/1
100 TABLET, FILM COATED ORAL DAILY
Status: DISCONTINUED | OUTPATIENT
Start: 2024-03-15 | End: 2024-03-15 | Stop reason: HOSPADM

## 2024-03-14 RX ORDER — LEVOTHYROXINE SODIUM 0.05 MG/1
50 TABLET ORAL DAILY
Status: DISCONTINUED | OUTPATIENT
Start: 2024-03-15 | End: 2024-03-15 | Stop reason: HOSPADM

## 2024-03-14 RX ORDER — MIDAZOLAM HYDROCHLORIDE 2 MG/2ML
INJECTION, SOLUTION INTRAMUSCULAR; INTRAVENOUS AS NEEDED
Status: DISCONTINUED | OUTPATIENT
Start: 2024-03-14 | End: 2024-03-14

## 2024-03-14 RX ORDER — ALBUMIN, HUMAN INJ 5% 5 %
SOLUTION INTRAVENOUS CONTINUOUS PRN
Status: DISCONTINUED | OUTPATIENT
Start: 2024-03-14 | End: 2024-03-14

## 2024-03-14 RX ORDER — SODIUM CHLORIDE, SODIUM LACTATE, POTASSIUM CHLORIDE, CALCIUM CHLORIDE 600; 310; 30; 20 MG/100ML; MG/100ML; MG/100ML; MG/100ML
75 INJECTION, SOLUTION INTRAVENOUS CONTINUOUS
Status: DISCONTINUED | OUTPATIENT
Start: 2024-03-14 | End: 2024-03-15 | Stop reason: HOSPADM

## 2024-03-14 RX ORDER — PROPOFOL 10 MG/ML
INJECTION, EMULSION INTRAVENOUS CONTINUOUS PRN
Status: DISCONTINUED | OUTPATIENT
Start: 2024-03-14 | End: 2024-03-14

## 2024-03-14 RX ORDER — MINERAL OIL
OIL (ML) MISCELLANEOUS AS NEEDED
Status: DISCONTINUED | OUTPATIENT
Start: 2024-03-14 | End: 2024-03-14 | Stop reason: HOSPADM

## 2024-03-14 RX ORDER — HYDROMORPHONE HCL/PF 1 MG/ML
0.5 SYRINGE (ML) INJECTION EVERY 4 HOURS PRN
Status: DISCONTINUED | OUTPATIENT
Start: 2024-03-14 | End: 2024-03-15 | Stop reason: HOSPADM

## 2024-03-14 RX ORDER — METOCLOPRAMIDE HYDROCHLORIDE 5 MG/ML
10 INJECTION INTRAMUSCULAR; INTRAVENOUS ONCE AS NEEDED
Status: DISCONTINUED | OUTPATIENT
Start: 2024-03-14 | End: 2024-03-14 | Stop reason: HOSPADM

## 2024-03-14 RX ORDER — LIDOCAINE HYDROCHLORIDE 10 MG/ML
INJECTION, SOLUTION EPIDURAL; INFILTRATION; INTRACAUDAL; PERINEURAL AS NEEDED
Status: DISCONTINUED | OUTPATIENT
Start: 2024-03-14 | End: 2024-03-14

## 2024-03-14 RX ORDER — FENTANYL CITRATE 50 UG/ML
INJECTION, SOLUTION INTRAMUSCULAR; INTRAVENOUS AS NEEDED
Status: DISCONTINUED | OUTPATIENT
Start: 2024-03-14 | End: 2024-03-14

## 2024-03-14 RX ORDER — ONDANSETRON 2 MG/ML
4 INJECTION INTRAMUSCULAR; INTRAVENOUS ONCE AS NEEDED
Status: DISCONTINUED | OUTPATIENT
Start: 2024-03-14 | End: 2024-03-14 | Stop reason: HOSPADM

## 2024-03-14 RX ORDER — OXYCODONE HYDROCHLORIDE 5 MG/1
5 TABLET ORAL EVERY 4 HOURS PRN
Status: DISCONTINUED | OUTPATIENT
Start: 2024-03-14 | End: 2024-03-15 | Stop reason: HOSPADM

## 2024-03-14 RX ORDER — ENOXAPARIN SODIUM 100 MG/ML
40 INJECTION SUBCUTANEOUS ONCE
Status: COMPLETED | OUTPATIENT
Start: 2024-03-14 | End: 2024-03-14

## 2024-03-14 RX ORDER — SODIUM CHLORIDE, SODIUM LACTATE, POTASSIUM CHLORIDE, CALCIUM CHLORIDE 600; 310; 30; 20 MG/100ML; MG/100ML; MG/100ML; MG/100ML
50 INJECTION, SOLUTION INTRAVENOUS CONTINUOUS
Status: DISCONTINUED | OUTPATIENT
Start: 2024-03-14 | End: 2024-03-15 | Stop reason: HOSPADM

## 2024-03-14 RX ORDER — GABAPENTIN 300 MG/1
300 CAPSULE ORAL ONCE
Status: COMPLETED | OUTPATIENT
Start: 2024-03-14 | End: 2024-03-14

## 2024-03-14 RX ORDER — OXYCODONE HYDROCHLORIDE 10 MG/1
10 TABLET ORAL EVERY 4 HOURS PRN
Status: DISCONTINUED | OUTPATIENT
Start: 2024-03-14 | End: 2024-03-15 | Stop reason: HOSPADM

## 2024-03-14 RX ORDER — PROPOFOL 10 MG/ML
INJECTION, EMULSION INTRAVENOUS AS NEEDED
Status: DISCONTINUED | OUTPATIENT
Start: 2024-03-14 | End: 2024-03-14

## 2024-03-14 RX ORDER — GABAPENTIN 300 MG/1
300 CAPSULE ORAL 3 TIMES DAILY
Status: DISCONTINUED | OUTPATIENT
Start: 2024-03-14 | End: 2024-03-15 | Stop reason: HOSPADM

## 2024-03-14 RX ORDER — SODIUM CHLORIDE, SODIUM LACTATE, POTASSIUM CHLORIDE, CALCIUM CHLORIDE 600; 310; 30; 20 MG/100ML; MG/100ML; MG/100ML; MG/100ML
INJECTION, SOLUTION INTRAVENOUS CONTINUOUS PRN
Status: DISCONTINUED | OUTPATIENT
Start: 2024-03-14 | End: 2024-03-14

## 2024-03-14 RX ORDER — CEFAZOLIN SODIUM 2 G/50ML
2000 SOLUTION INTRAVENOUS ONCE
Status: COMPLETED | OUTPATIENT
Start: 2024-03-14 | End: 2024-03-14

## 2024-03-14 RX ORDER — MAGNESIUM HYDROXIDE 1200 MG/15ML
LIQUID ORAL AS NEEDED
Status: DISCONTINUED | OUTPATIENT
Start: 2024-03-14 | End: 2024-03-14 | Stop reason: HOSPADM

## 2024-03-14 RX ORDER — CEFAZOLIN SODIUM 1 G/50ML
1000 SOLUTION INTRAVENOUS EVERY 8 HOURS
Qty: 100 ML | Refills: 0 | Status: COMPLETED | OUTPATIENT
Start: 2024-03-14 | End: 2024-03-15

## 2024-03-14 RX ORDER — ROCURONIUM BROMIDE 10 MG/ML
INJECTION, SOLUTION INTRAVENOUS AS NEEDED
Status: DISCONTINUED | OUTPATIENT
Start: 2024-03-14 | End: 2024-03-14

## 2024-03-14 RX ORDER — HYDROMORPHONE HCL IN WATER/PF 6 MG/30 ML
0.2 PATIENT CONTROLLED ANALGESIA SYRINGE INTRAVENOUS
Status: ACTIVE | OUTPATIENT
Start: 2024-03-14 | End: 2024-03-14

## 2024-03-14 RX ORDER — ONDANSETRON 2 MG/ML
4 INJECTION INTRAMUSCULAR; INTRAVENOUS EVERY 6 HOURS PRN
Status: DISCONTINUED | OUTPATIENT
Start: 2024-03-14 | End: 2024-03-15 | Stop reason: HOSPADM

## 2024-03-14 RX ORDER — ALBUTEROL SULFATE 2.5 MG/3ML
2.5 SOLUTION RESPIRATORY (INHALATION) ONCE AS NEEDED
Status: DISCONTINUED | OUTPATIENT
Start: 2024-03-14 | End: 2024-03-14 | Stop reason: HOSPADM

## 2024-03-14 RX ORDER — ACETAMINOPHEN 325 MG/1
650 TABLET ORAL EVERY 6 HOURS
Status: DISCONTINUED | OUTPATIENT
Start: 2024-03-14 | End: 2024-03-15 | Stop reason: HOSPADM

## 2024-03-14 RX ORDER — ACETAMINOPHEN 325 MG/1
975 TABLET ORAL ONCE
Status: COMPLETED | OUTPATIENT
Start: 2024-03-14 | End: 2024-03-14

## 2024-03-14 RX ORDER — FENTANYL CITRATE/PF 50 MCG/ML
25 SYRINGE (ML) INJECTION
Status: ACTIVE | OUTPATIENT
Start: 2024-03-14 | End: 2024-03-14

## 2024-03-14 RX ADMIN — FENTANYL CITRATE 50 MCG: 50 INJECTION, SOLUTION INTRAMUSCULAR; INTRAVENOUS at 11:06

## 2024-03-14 RX ADMIN — ACETAMINOPHEN 650 MG: 325 TABLET ORAL at 23:45

## 2024-03-14 RX ADMIN — SODIUM CHLORIDE, SODIUM LACTATE, POTASSIUM CHLORIDE, AND CALCIUM CHLORIDE: .6; .31; .03; .02 INJECTION, SOLUTION INTRAVENOUS at 09:54

## 2024-03-14 RX ADMIN — SODIUM CHLORIDE, SODIUM LACTATE, POTASSIUM CHLORIDE, AND CALCIUM CHLORIDE: .6; .31; .03; .02 INJECTION, SOLUTION INTRAVENOUS at 10:41

## 2024-03-14 RX ADMIN — HYDROMORPHONE HYDROCHLORIDE 0.5 MG: 1 INJECTION, SOLUTION INTRAMUSCULAR; INTRAVENOUS; SUBCUTANEOUS at 09:05

## 2024-03-14 RX ADMIN — DEXAMETHASONE SODIUM PHOSPHATE 10 MG: 10 INJECTION, SOLUTION INTRAMUSCULAR; INTRAVENOUS at 08:37

## 2024-03-14 RX ADMIN — GABAPENTIN 300 MG: 300 CAPSULE ORAL at 21:22

## 2024-03-14 RX ADMIN — ROCURONIUM BROMIDE 10 MG: 10 INJECTION, SOLUTION INTRAVENOUS at 08:05

## 2024-03-14 RX ADMIN — DEXMEDETOMIDINE HYDROCHLORIDE 4 MCG: 100 INJECTION, SOLUTION INTRAVENOUS at 11:08

## 2024-03-14 RX ADMIN — ROCURONIUM BROMIDE 20 MG: 10 INJECTION, SOLUTION INTRAVENOUS at 08:58

## 2024-03-14 RX ADMIN — DEXMEDETOMIDINE HYDROCHLORIDE 4 MCG: 100 INJECTION, SOLUTION INTRAVENOUS at 09:24

## 2024-03-14 RX ADMIN — ACETAMINOPHEN 650 MG: 325 TABLET ORAL at 18:02

## 2024-03-14 RX ADMIN — ALBUMIN (HUMAN): 12.5 INJECTION, SOLUTION INTRAVENOUS at 11:39

## 2024-03-14 RX ADMIN — SODIUM CHLORIDE, SODIUM LACTATE, POTASSIUM CHLORIDE, AND CALCIUM CHLORIDE: .6; .31; .03; .02 INJECTION, SOLUTION INTRAVENOUS at 13:12

## 2024-03-14 RX ADMIN — FENTANYL CITRATE 50 MCG: 50 INJECTION, SOLUTION INTRAMUSCULAR; INTRAVENOUS at 12:37

## 2024-03-14 RX ADMIN — MIDAZOLAM 0.5 MG: 1 INJECTION INTRAMUSCULAR; INTRAVENOUS at 09:30

## 2024-03-14 RX ADMIN — SODIUM CHLORIDE, SODIUM LACTATE, POTASSIUM CHLORIDE, AND CALCIUM CHLORIDE: .6; .31; .03; .02 INJECTION, SOLUTION INTRAVENOUS at 07:13

## 2024-03-14 RX ADMIN — DEXMEDETOMIDINE HYDROCHLORIDE 12 MCG: 100 INJECTION, SOLUTION INTRAVENOUS at 09:12

## 2024-03-14 RX ADMIN — FENTANYL CITRATE 100 MCG: 50 INJECTION, SOLUTION INTRAMUSCULAR; INTRAVENOUS at 07:33

## 2024-03-14 RX ADMIN — ACETAMINOPHEN 975 MG: 325 TABLET ORAL at 06:24

## 2024-03-14 RX ADMIN — PROPOFOL 50 MG: 10 INJECTION, EMULSION INTRAVENOUS at 11:06

## 2024-03-14 RX ADMIN — PROPOFOL 100 MCG/KG/MIN: 10 INJECTION, EMULSION INTRAVENOUS at 07:44

## 2024-03-14 RX ADMIN — ONDANSETRON 4 MG: 2 INJECTION INTRAMUSCULAR; INTRAVENOUS at 08:37

## 2024-03-14 RX ADMIN — ROCURONIUM BROMIDE 20 MG: 10 INJECTION, SOLUTION INTRAVENOUS at 09:28

## 2024-03-14 RX ADMIN — SUGAMMADEX 200 MG: 100 INJECTION, SOLUTION INTRAVENOUS at 09:51

## 2024-03-14 RX ADMIN — LIDOCAINE HYDROCHLORIDE 50 MG: 10 INJECTION, SOLUTION EPIDURAL; INFILTRATION; INTRACAUDAL; PERINEURAL at 07:33

## 2024-03-14 RX ADMIN — SODIUM CHLORIDE, SODIUM LACTATE, POTASSIUM CHLORIDE, AND CALCIUM CHLORIDE 50 ML/HR: .6; .31; .03; .02 INJECTION, SOLUTION INTRAVENOUS at 06:42

## 2024-03-14 RX ADMIN — SODIUM CHLORIDE, SODIUM LACTATE, POTASSIUM CHLORIDE, AND CALCIUM CHLORIDE 75 ML/HR: .6; .31; .03; .02 INJECTION, SOLUTION INTRAVENOUS at 17:41

## 2024-03-14 RX ADMIN — HYDROMORPHONE HYDROCHLORIDE 0.5 MG: 1 INJECTION, SOLUTION INTRAMUSCULAR; INTRAVENOUS; SUBCUTANEOUS at 12:49

## 2024-03-14 RX ADMIN — PROPOFOL 60 MG: 10 INJECTION, EMULSION INTRAVENOUS at 13:37

## 2024-03-14 RX ADMIN — CEFAZOLIN SODIUM 1000 MG: 1 SOLUTION INTRAVENOUS at 21:34

## 2024-03-14 RX ADMIN — PROPOFOL 40 MG: 10 INJECTION, EMULSION INTRAVENOUS at 09:29

## 2024-03-14 RX ADMIN — GABAPENTIN 300 MG: 300 CAPSULE ORAL at 06:24

## 2024-03-14 RX ADMIN — MIDAZOLAM 2 MG: 1 INJECTION INTRAMUSCULAR; INTRAVENOUS at 07:27

## 2024-03-14 RX ADMIN — DEXMEDETOMIDINE HYDROCHLORIDE 4 MCG: 100 INJECTION, SOLUTION INTRAVENOUS at 09:00

## 2024-03-14 RX ADMIN — DEXMEDETOMIDINE HYDROCHLORIDE 4 MCG: 100 INJECTION, SOLUTION INTRAVENOUS at 08:19

## 2024-03-14 RX ADMIN — PROPOFOL 200 MG: 10 INJECTION, EMULSION INTRAVENOUS at 07:33

## 2024-03-14 RX ADMIN — CEFAZOLIN SODIUM 2000 MG: 2 SOLUTION INTRAVENOUS at 07:47

## 2024-03-14 RX ADMIN — CEFAZOLIN SODIUM 2000 MG: 2 SOLUTION INTRAVENOUS at 11:31

## 2024-03-14 RX ADMIN — MIDAZOLAM 1 MG: 1 INJECTION INTRAMUSCULAR; INTRAVENOUS at 15:55

## 2024-03-14 RX ADMIN — ENOXAPARIN SODIUM 40 MG: 40 INJECTION SUBCUTANEOUS at 06:27

## 2024-03-14 RX ADMIN — DEXMEDETOMIDINE HYDROCHLORIDE 4 MCG: 100 INJECTION, SOLUTION INTRAVENOUS at 09:28

## 2024-03-14 RX ADMIN — MIDAZOLAM 0.5 MG: 1 INJECTION INTRAMUSCULAR; INTRAVENOUS at 12:33

## 2024-03-14 RX ADMIN — ROCURONIUM BROMIDE 50 MG: 10 INJECTION, SOLUTION INTRAVENOUS at 07:33

## 2024-03-14 RX ADMIN — PROPOFOL 30 MG: 10 INJECTION, EMULSION INTRAVENOUS at 09:22

## 2024-03-14 RX ADMIN — ONDANSETRON 4 MG: 2 INJECTION INTRAMUSCULAR; INTRAVENOUS at 15:25

## 2024-03-14 NOTE — PLAN OF CARE
Problem: Prexisting or High Potential for Compromised Skin Integrity  Goal: Skin integrity is maintained or improved  Description: INTERVENTIONS:  - Identify patients at risk for skin breakdown  - Assess and monitor skin integrity  - Assess and monitor nutrition and hydration status  - Monitor labs   - Assess for incontinence   - Turn and reposition patient  - Assist with mobility/ambulation  - Relieve pressure over bony prominences  - Avoid friction and shearing  - Provide appropriate hygiene as needed including keeping skin clean and dry  - Evaluate need for skin moisturizer/barrier cream  - Collaborate with interdisciplinary team   - Patient/family teaching  - Consider wound care consult   Outcome: Progressing     Problem: PAIN - ADULT  Goal: Verbalizes/displays adequate comfort level or baseline comfort level  Description: Interventions:  - Encourage patient to monitor pain and request assistance  - Assess pain using appropriate pain scale  - Administer analgesics based on type and severity of pain and evaluate response  - Implement non-pharmacological measures as appropriate and evaluate response  - Consider cultural and social influences on pain and pain management  - Notify physician/advanced practitioner if interventions unsuccessful or patient reports new pain  Outcome: Progressing     Problem: INFECTION - ADULT  Goal: Absence or prevention of progression during hospitalization  Description: INTERVENTIONS:  - Assess and monitor for signs and symptoms of infection  - Monitor lab/diagnostic results  - Monitor all insertion sites, i.e. indwelling lines, tubes, and drains  - Monitor endotracheal if appropriate and nasal secretions for changes in amount and color  - Davey appropriate cooling/warming therapies per order  - Administer medications as ordered  - Instruct and encourage patient and family to use good hand hygiene technique  - Identify and instruct in appropriate isolation precautions for  identified infection/condition  Outcome: Progressing  Goal: Absence of fever/infection during neutropenic period  Description: INTERVENTIONS:  - Monitor WBC    Outcome: Progressing     Problem: SAFETY ADULT  Goal: Patient will remain free of falls  Description: INTERVENTIONS:  - Educate patient/family on patient safety including physical limitations  - Instruct patient to call for assistance with activity   - Consult OT/PT to assist with strengthening/mobility   - Keep Call bell within reach  - Keep bed low and locked with side rails adjusted as appropriate  - Keep care items and personal belongings within reach  - Initiate and maintain comfort rounds  - Make Fall Risk Sign visible to staff  - - Apply yellow socks and bracelet for high fall risk patients  - Consider moving patient to room near nurses station  Outcome: Progressing  Goal: Maintain or return to baseline ADL function  Description: INTERVENTIONS:  -  Assess patient's ability to carry out ADLs; assess patient's baseline for ADL function and identify physical deficits which impact ability to perform ADLs (bathing, care of mouth/teeth, toileting, grooming, dressing, etc.)  - Assess/evaluate cause of self-care deficits   - Assess range of motion  - Assess patient's mobility; develop plan if impaired  - Assess patient's need for assistive devices and provide as appropriate  - Encourage maximum independence but intervene and supervise when necessary  - Involve family in performance of ADLs  - Assess for home care needs following discharge   - Consider OT consult to assist with ADL evaluation and planning for discharge  - Provide patient education as appropriate  Outcome: Progressing  Goal: Maintains/Returns to pre admission functional level  Description: INTERVENTIONS:  - Perform AM-PAC 6 Click Basic Mobility/ Daily Activity assessment daily.  - Set and communicate daily mobility goal to care team and patient/family/caregiver.   - Collaborate with  rehabilitation services on mobility goals if consulted  - Perform Range of Motion 2 times a day.  - Reposition patient every 2 hours.  - Record patient progress and toleration of activity level   Outcome: Progressing     Problem: DISCHARGE PLANNING  Goal: Discharge to home or other facility with appropriate resources  Description: INTERVENTIONS:  - Identify barriers to discharge w/patient and caregiver  - Arrange for needed discharge resources and transportation as appropriate  - Identify discharge learning needs (meds, wound care, etc.)  - Arrange for interpretive services to assist at discharge as needed  - Refer to Case Management Department for coordinating discharge planning if the patient needs post-hospital services based on physician/advanced practitioner order or complex needs related to functional status, cognitive ability, or social support system  Outcome: Progressing     Problem: Knowledge Deficit  Goal: Patient/family/caregiver demonstrates understanding of disease process, treatment plan, medications, and discharge instructions  Description: Complete learning assessment and assess knowledge base.  Interventions:  - Provide teaching at level of understanding  - Provide teaching via preferred learning methods  Outcome: Progressing

## 2024-03-14 NOTE — ANESTHESIA POSTPROCEDURE EVALUATION
Post-Op Assessment Note    CV Status:  Stable  Pain Score: 0    Pain management: adequate       Mental Status:  Alert and awake   Hydration Status:  Euvolemic   PONV Controlled:  Controlled   Airway Patency:  Patent     Post Op Vitals Reviewed: Yes    No anethesia notable event occurred.    Staff: CRNA           BP      Temp      Pulse     Resp      SpO2

## 2024-03-14 NOTE — ANESTHESIA PREPROCEDURE EVALUATION
Procedure:  BREAST REDUCTION (Bilateral: Breast)  ABDOMINOPLASTY (Abdomen)  LIPOSUCTION TO BACK & FLANKS (Back)    Relevant Problems   CARDIO   (+) Chronic right-sided thoracic back pain      ENDO   (+) Acquired hypothyroidism      GI/HEPATIC   (+) Acid reflux disease      MUSCULOSKELETAL   (+) Chronic right-sided thoracic back pain      NEURO/PSYCH   (+) Chronic right-sided thoracic back pain        Physical Exam    Airway    Mallampati score: II  TM Distance: >3 FB  Neck ROM: full     Dental       Cardiovascular  Cardiovascular exam normal    Pulmonary  Pulmonary exam normal     Other Findings  post-pubertal.      Anesthesia Plan  ASA Score- 2     Anesthesia Type- general with ASA Monitors.         Additional Monitors:     Airway Plan: ETT.           Plan Factors-Exercise tolerance (METS): >4 METS.    Chart reviewed.   Existing labs reviewed.     Patient is not a current smoker. Patient not instructed to abstain from smoking on day of procedure. Patient did not smoke on day of surgery.            Induction- intravenous.    Postoperative Plan- Plan for postoperative opioid use.     Informed Consent- Anesthetic plan and risks discussed with patient and spouse.  I personally reviewed this patient with the CRNA. Discussed and agreed on the Anesthesia Plan with the CRNA..              Lab Results   Component Value Date    HGBA1C 6.0 (H) 12/15/2023       Lab Results   Component Value Date    K 3.9 02/29/2024     02/29/2024    CO2 27 02/29/2024    BUN 14 02/29/2024    CREATININE 0.74 02/29/2024    GLUF 97 02/29/2024    CALCIUM 9.8 02/29/2024    CORRECTEDCA 10.4 (H) 08/08/2023    AST 18 08/08/2023    ALT 31 08/08/2023    ALKPHOS 82 08/08/2023    EGFR 94 02/29/2024       Lab Results   Component Value Date    WBC 9.26 02/29/2024    HGB 13.4 02/29/2024    HCT 42.2 02/29/2024    MCV 98 02/29/2024     02/29/2024

## 2024-03-14 NOTE — DISCHARGE INSTR - AVS FIRST PAGE
Surgery Date: 3/14/2024                Patient: Kathy Thomas  Surgeon: Dr. Boyd     Postoperative Instructions   Abdominoplasty/Bilateral Breast Reduction/Liposuction    Dressings:  [x] Skin glue was applied to your incision over absorbable sutures.  You may feel small pieces of suture at the ends of your incision.    [] Remove dressing the second morning following your surgery and bathe as directed.  [x] No dressings are required but you may cover the incision with gauze for comfort.  [x] Wear your surgical binder and surgical bra at all times except while bathing.  [x] Strip and record your BROOKS drain output as instructed.  Be sure to bring the output log to your follow up appointment.  [] Other instructions:     Bathing:  [x] Shower 48 hours after surgery.  Allow soap and water to gently wash over the incision.  No scrubbing.  Gently pat dry and apply dressing as needed/instructed above.  [x] No submerging incision in bathtub, pool, hot tub and/or lake.    Activity:  [x] No heavy lifting (> 10lbs).  [x] No strenuous exercise.  [x] Walking is mandatory daily.  [x] Sleeping may be more comfortable with your head and knees elevated or in a recliner.  [x] No driving until off pain medications and you have resumed full range of motion.    Diet and Medication:  [x] Resume diet as tolerated.  High protein diet is important for healing.  Remain well hydrated with water and minimize sodium intake.  [x] Resume preoperative medications.  [x] Pain medications as prescribed.  You may also begin to use ibuprofen 48 hours after surgery.  [] Finish all antibiotics as prescribed.  [x] Apply ice to area as needed for pain.  Do not place ice directly on skin.  Do not use heat.  [] Other instructions:     It is expected to have some bruising, swelling and mild oozing at the incision site and of the surrounding area.  If there is more than you expect, an enlarging area or you suspect an infection, please call the office.    Some  patients may experience a low-grade fever after surgery.  If it is above 100.4, please call the office.    If you do not have a postoperative office appointment scheduled, please call the office today and let the staff know you are to be seen in 7 days.     Please call [phone] (Dr. Boyd's office) or [phone] (Dr. Boyd's cell phone) with any questions, concerns or changes.      BROOKS Drain Output Log     Date Time Drain #1 Drain #2

## 2024-03-14 NOTE — OP NOTE
OPERATIVE REPORT  PATIENT NAME: Kathy Thomas    :  1970  MRN: [id number]  Pt Location:  OR ROOM 12    SURGERY DATE: 3/14/2024    Surgeons and Role:     * Josefina Boyd, DO - Primary  Milly Ramsey PA-C    Preop Diagnosis:  Macromastia [N62], Back pain, intertrigo  Unacceptable cosmetic appearance of back, flanks, abdomen    Postop Diagnosis:  Macromastia [N62], Back pain, intertrigo  Unacceptable cosmetic appearance of back, flanks, abdomen    Procedure(s):  Bilateral - BREAST REDUCTION  ABDOMINOPLASTY  LIPOSUCTION TO BACK & FLANKS    Specimen(s):  * No specimens in log *    Estimated Blood Loss:   Minimal    Drains:  15 Fr BROOKS x 2 abdomen    Anesthesia Type:   General/TIVA    Operative Indications:  54 yo female presents with symptomatic macromastia and unacceptable cosmetic appearance of back, flanks and abdomen    Operative Findings:  2000 cc lipoaspirate  1002 gram reduction on right with 10x6 cm macedo flap  1218 gram reduction on left with 11x6 cm macedo flap  2.89 kg gram abdominal flap excised    Complications:   None    Procedure and Technique:  The patient was seen preoperatively.  The procedure, risks, benefits and alternatives were discussed.  Informed consent was obtained.  The patient was site marked preoperatively.  The patient was brought to the operating room where she was positioned supine with all of her pressure points appropriately padded.  Anesthesia commenced.  She was then turned prone with all of her pressure points padded.  A timeout was performed and verified.     The patient was prepped and draped in usual sterile fashion.  I infiltrated tumescent solution to her back and flanks.  #3 and #4 Rashida microaire cannulas were used to perform suction assisted lipectomy.  Once symmetry was noted by pinch test, the incisions were closed with a single 4-0 PDS suture.      The patient was then turned supine and again assured her pressure points were appropriately padded.   She was reprepped and draped. The wise pattern markings were incised as well as a 38 mm areola cookie cutter markings on the right breast.  I de-epithelialized the inferior pedicle as well as a laterally extending macedo flap.  This was then dissected down to pectoralis fascia.  The dissection was carried superiorly along the pectoralis fascia to allow for mobilization of the pedicle more superiorly.  The medial and lateral pillars were thinned to create proper contour of the breast without undue tension on the closure.  The area was copiously irrigated and hemostasis was obtained.  A pec block was performed.  The macedo flap was then rotated superiorly and medially and tacked to the periosteum using 2-0 PDS.  The shape and closure was tailor tacked and excess skin was excised sharply.   Closure commenced using 2-0 PDS for the fascia, 3-0 monocryl for the deep dermis and 4-0 PDS for the subcuticular stitch.  The site for the NAC was measured and incised. The underlying NAC was brought to the surface without undue tension and was secured in placed using 3-0 monocryl and 5-0 monocryl.  The same procedure was performed on the left.  The nipple areola complexes appeared healthy and well perfused.      I next turned my attention to the abdominoplasty portion of the procedure.  I remeasured and confirmed my markings.  The umbilicus was incised and electrocautery was used to dissect down along the stalk to the fascia.  The inferior transverse incision was made sharply and carried down to rectus fascia using electrocatuery.  The dissection was then carried superiorly to the xiphoid and subcostal margins.  The defect was copious irrigated and hemostasis was obtained.    A TAP block was performed.  The diastasis was marked and plicated in a buried figure of eight layer followed by a running layer using 0 PDS.  The umbilicus was tacked to the fascia in the 12, 3, 6 and 9 o'clock positions using 2-0 PDS.    Next, the patient was  placed in the beach chair position and placement of progressive tension suture commenced using 2-0 PDS. The flap was measured to ensure tension free closure.  The superior flap incision was marked and made sharply followed by electrocautery to complete the dissection. The position of the umbilicus was marked.  Vy's layer was closed using 2-0 PDS.  1, 15 Fr BROOKS drains was placed through the right aspects of the incision and secured in place using 2-0 nylon.  The deep dermis was closed using 3-0 monocryl and the subcuticular layer was run using 4-0 PDS.  The abdominal flap was incised in the location of the marked umbilicus and carried down to the underlying space were the umbilicus was encountered.  The deep dermis of the abdominal flap was rolled down to the deep dermis of the umbilicus using 3-0 monocryl.  5-0 monnocryl was used in a half buried running horizontal mattress fashion to approximate the skin.  The area was cleansed, dried and prineo was applied to the transverse incision.  The umbilicus was cannulated using xeroform.  ABD pads and binder were applied as well as a surgical bra.     The instrument, sponge and needle count was correct an verified prior to completion of the case.     The patient emerged from anesthesia and was transferred to the recovery room in stable condition.      Patient Disposition:  PACU         SIGNATURE: Josefina Boyd DO  DATE: March 14, 2024  TIME: 7:27 AM    No qualified plastic surgery resident was available for the case.  The SLOANE provided assistance with retraction and suturing.

## 2024-03-14 NOTE — PROGRESS NOTES
Patient seen and examined.  Doing well postop and pain controlled.    B/l NAC well perfused.  Expected edema and ecchymosis.  Serosanguinous strikethrough on the umbilical dressing, expectant edema and ecchymosis, serosanguinous BROOKS drainage    Pain control as needed.  Encourage ambulation and IS.    Josefina Boyd, DO  Plastic and Reconstructive Surgery

## 2024-03-14 NOTE — INTERVAL H&P NOTE
H&P reviewed. After examining the patient I find no changes in the patients condition since the H&P had been written.    Vitals:    03/14/24 0612   BP: 121/76   Pulse: 66   Resp: 18   Temp: (!) 96.5 °F (35.8 °C)   SpO2: 94%

## 2024-03-15 VITALS
BODY MASS INDEX: 32.78 KG/M2 | RESPIRATION RATE: 18 BRPM | OXYGEN SATURATION: 93 % | DIASTOLIC BLOOD PRESSURE: 65 MMHG | TEMPERATURE: 96.8 F | HEIGHT: 63 IN | HEART RATE: 80 BPM | SYSTOLIC BLOOD PRESSURE: 107 MMHG | WEIGHT: 185 LBS

## 2024-03-15 PROCEDURE — 99024 POSTOP FOLLOW-UP VISIT: CPT

## 2024-03-15 RX ADMIN — GABAPENTIN 300 MG: 300 CAPSULE ORAL at 08:30

## 2024-03-15 RX ADMIN — CEFAZOLIN SODIUM 1000 MG: 1 SOLUTION INTRAVENOUS at 05:29

## 2024-03-15 RX ADMIN — ACETAMINOPHEN 650 MG: 325 TABLET ORAL at 08:31

## 2024-03-15 RX ADMIN — SERTRALINE HYDROCHLORIDE 100 MG: 100 TABLET ORAL at 08:30

## 2024-03-15 RX ADMIN — LEVOTHYROXINE SODIUM 50 MCG: 50 TABLET ORAL at 08:30

## 2024-03-15 RX ADMIN — SODIUM CHLORIDE, SODIUM LACTATE, POTASSIUM CHLORIDE, AND CALCIUM CHLORIDE 75 ML/HR: .6; .31; .03; .02 INJECTION, SOLUTION INTRAVENOUS at 05:28

## 2024-03-15 NOTE — PLAN OF CARE
Problem: PAIN - ADULT  Goal: Verbalizes/displays adequate comfort level or baseline comfort level  Description: Interventions:  - Encourage patient to monitor pain and request assistance  - Assess pain using appropriate pain scale  - Administer analgesics based on type and severity of pain and evaluate response  - Implement non-pharmacological measures as appropriate and evaluate response  - Consider cultural and social influences on pain and pain management  - Notify physician/advanced practitioner if interventions unsuccessful or patient reports new pain  Outcome: Progressing     Problem: INFECTION - ADULT  Goal: Absence or prevention of progression during hospitalization  Description: INTERVENTIONS:  - Assess and monitor for signs and symptoms of infection  - Monitor lab/diagnostic results  - Monitor all insertion sites, i.e. indwelling lines, tubes, and drains  - Monitor endotracheal if appropriate and nasal secretions for changes in amount and color  - [place] appropriate cooling/warming therapies per order  - Administer medications as ordered  - Instruct and encourage patient and family to use good hand hygiene technique  - Identify and instruct in appropriate isolation precautions for identified infection/condition  Outcome: Progressing  Goal: Absence of fever/infection during neutropenic period  Description: INTERVENTIONS:  - Monitor WBC    Outcome: Progressing     Problem: SAFETY ADULT  Goal: Patient will remain free of falls  Description: INTERVENTIONS:  - Educate patient/family on patient safety including physical limitations  - Instruct patient to call for assistance with activity   - Consult OT/PT to assist with strengthening/mobility   - Keep Call bell within reach  - Keep bed low and locked with side rails adjusted as appropriate  - Keep care items and personal belongings within reach  - Initiate and maintain comfort rounds  - Make Fall Risk Sign visible to staff  - Apply yellow socks and bracelet  for high fall risk patients  - Consider moving patient to room near nurses station  Outcome: Progressing  Goal: Maintain or return to baseline ADL function  Description: INTERVENTIONS:  -  Assess patient's ability to carry out ADLs; assess patient's baseline for ADL function and identify physical deficits which impact ability to perform ADLs (bathing, care of mouth/teeth, toileting, grooming, dressing, etc.)  - Assess/evaluate cause of self-care deficits   - Assess range of motion  - Assess patient's mobility; develop plan if impaired  - Assess patient's need for assistive devices and provide as appropriate  - Encourage maximum independence but intervene and supervise when necessary  - Involve family in performance of ADLs  - Assess for home care needs following discharge   - Consider OT consult to assist with ADL evaluation and planning for discharge  - Provide patient education as appropriate  Outcome: Progressing  Goal: Maintains/Returns to pre admission functional level  Description: INTERVENTIONS:  - Perform AM-PAC 6 Click Basic Mobility/ Daily Activity assessment daily.  - Set and communicate daily mobility goal to care team and patient/family/caregiver.   - Collaborate with rehabilitation services on mobility goals if consulted  - Out of bed for toileting  - Record patient progress and toleration of activity level   Outcome: Progressing     Problem: DISCHARGE PLANNING  Goal: Discharge to home or other facility with appropriate resources  Description: INTERVENTIONS:  - Identify barriers to discharge w/patient and caregiver  - Arrange for needed discharge resources and transportation as appropriate  - Identify discharge learning needs (meds, wound care, etc.)  - Arrange for interpretive services to assist at discharge as needed  - Refer to Case Management Department for coordinating discharge planning if the patient needs post-hospital services based on physician/advanced practitioner order or complex needs  related to functional status, cognitive ability, or social support system  Outcome: Progressing     Problem: Knowledge Deficit  Goal: Patient/family/caregiver demonstrates understanding of disease process, treatment plan, medications, and discharge instructions  Description: Complete learning assessment and assess knowledge base.  Interventions:  - Provide teaching at level of understanding  - Provide teaching via preferred learning methods  Outcome: Progressing

## 2024-03-15 NOTE — NURSING NOTE
Kathy is discharged. IV removed with compression held to control bleeding. AVS discussed with her and family. They both verbalize understanding. Drain record given and instructed on use, care and emptying of kevin drains. No questions verbalized when asked. ABD pads given for scant dressing at right edge of abdominal incision when up out of bed. Instructed to call Steven's office for questions.

## 2024-03-15 NOTE — PROGRESS NOTES
"Progress Note - Plastic Surgery   Kathy Thomas 53 y.o. female MRN: [id number]  Unit/Bed#: 7T [facility] 707-01 Encounter: [id number]    Assessment:  Patient is a 53 year old female, POD#1 bilateral breast reduction, abdominoplasty and liposuction to back and flanks by Dr. Boyd. No acute events overnight, patient eager for discharge.      Plan:  -Discharge to home.  -Discharge instructions reviewed. Post-op pain medications sent to pharmacy.  -Patient is to follow up in office 3/21/24 for first post-op appt. She may call with any concerns.    Subjective/Objective     Subjective: Patient is a 53 year old female, POD#1 bilateral breast reduction, abdominoplasty and liposuction to back and flanks by Dr. Boyd. Pain well controlled. Patient denies significant pain, fevers, chills, SOB, chest pain or any other complaints at this time. Voiding independently and tolerating diet. No acute events overnight. Patient would like to go home today.      Objective:     Vitals: Blood pressure 107/65, pulse 80, temperature (!) 96.8 °F (36 °C), temperature source Temporal, resp. rate 18, height 5' 3\" (1.6 m), weight 83.9 kg (185 lb), last menstrual period 11/14/2018, SpO2 93%, not currently breastfeeding.,Body mass index is 32.77 kg/m².    I/O         03/13 0701  03/14 0700 03/14 0701  03/15 0700 03/15 0701  03/16 0700    P.O.  960     I.V. (mL/kg)  5501.3 (65.6)     IV Piggyback  400     Total Intake(mL/kg)  6861.3 (81.8)     Urine (mL/kg/hr)  1550 (0.8)     Drains  60     Other  3000     Blood  150     Total Output  4760     Net  +2101.3                    Physical Exam:  Constitutional:AAOx3, well-developed, no distress. Pt is cooperative and pleasant.  Breast: Steri strips over incision sites. No active bleeding or drainage. Expected post-op edema and bruising. No concerns for infection or hematoma.  Abdomen: Steri strips over incision sites. Dressings over belly button. Serosang fluid. Expected edema and bruising. No concern for " infection, hematoma or seroma. Drains patent with serosang fluid, suction in tact. Liposuction sites intact.    Mariposa Vivas PA-C  Plastic and Reconstructive Surgery

## 2024-03-19 ENCOUNTER — TELEPHONE (OUTPATIENT)
Age: 54
End: 2024-03-19

## 2024-03-19 PROCEDURE — 88305 TISSUE EXAM BY PATHOLOGIST: CPT | Performed by: PATHOLOGY

## 2024-03-19 NOTE — TELEPHONE ENCOUNTER
Patient says her right drain isn't draining as much as the left side and it is also draining yellow. She denies any fever, says her highest temp was around 99. She also denies any tenderness around the breast are, says she has been moving around just fine and she is also eating fine as well.     Instructed that she send in a phot of the drain. Awaiting [employer] message. Will forward back once we receive it.

## 2024-03-21 ENCOUNTER — OFFICE VISIT (OUTPATIENT)
Dept: PLASTIC SURGERY | Facility: CLINIC | Age: 54
End: 2024-03-21

## 2024-03-21 DIAGNOSIS — Z98.890 S/P COSMETIC PLASTIC SURGERY: Primary | ICD-10-CM

## 2024-03-21 PROCEDURE — RECHECK: Performed by: PHYSICIAN ASSISTANT

## 2024-03-21 NOTE — PROGRESS NOTES
Assessment/Plan:     Patient is a 53 YOF who is status post bilateral breast reduction, lipoabdominoplasty and liposuction to back and flanks by Dr. Boyd on 3/14/24. Please see HPI.    Patient returns to the office today for first postoperative visit and drain check.  Right drain has produced approximately 5 to 10 cc of drain output in the past 48 hours, drain removed today.  Patient will continue to track and record drain output.  She will return to the office in approximately 1 week for a drain check and suture removal from her liposuction sites.  Patient to continue to wear supportive binder and bra at all times.        Diagnoses and all orders for this visit:    S/P cosmetic plastic surgery          Subjective:     Patient ID: Kathy Thomas is a 53 y.o. female.    HPI    Patient presents to the office today for .  She states that she has been doing well postoperatively and pain is well-controlled with over-the-counter medications only.  She has no issues or concerns.    Review of Systems    See HPI    Objective:     Physical Exam      Incisions are clean, dry and intact.  Drains are serous bilaterally.  NAC's are well-perfused.

## 2024-03-25 DIAGNOSIS — R23.2 HOT FLASHES: ICD-10-CM

## 2024-03-25 DIAGNOSIS — F51.01 PRIMARY INSOMNIA: ICD-10-CM

## 2024-03-25 RX ORDER — SERTRALINE HYDROCHLORIDE 100 MG/1
TABLET, FILM COATED ORAL
Qty: 90 TABLET | Refills: 0 | Status: SHIPPED | OUTPATIENT
Start: 2024-03-25

## 2024-03-28 DIAGNOSIS — E03.9 ACQUIRED HYPOTHYROIDISM: ICD-10-CM

## 2024-03-28 RX ORDER — LEVOTHYROXINE SODIUM 0.05 MG/1
50 TABLET ORAL DAILY
Qty: 90 TABLET | Refills: 1 | Status: SHIPPED | OUTPATIENT
Start: 2024-03-28

## 2024-03-29 ENCOUNTER — OFFICE VISIT (OUTPATIENT)
Dept: PLASTIC SURGERY | Facility: CLINIC | Age: 54
End: 2024-03-29

## 2024-03-29 DIAGNOSIS — N62 MACROMASTIA: Primary | ICD-10-CM

## 2024-03-29 PROCEDURE — 99024 POSTOP FOLLOW-UP VISIT: CPT | Performed by: PHYSICIAN ASSISTANT

## 2024-03-29 NOTE — PROGRESS NOTES
Assessment and Plan:  Kathy Thomas 53 y.o. female s/p bilateral breast reduction, abdominoplasty, back and flanks liposuction 3/14/24, presenting for post op visit    - drain with minimal output. Removed  - earplug to umbilicus daily. Cover with gauze and tape  - aquaphor to all incisions. Demonstrated scar massage  - shower daily  - steri strips and suture ends removed  - continue surgical bra and abdominal binder  - foam roller to abdomen and back  - return in 2-3 weeks. Call if any questions/concerns    Subjective:    Patient doing well. She is eager to have her drain removed. Suture ends removed. She did notice some drainage from her midline lower incision with very small amount of dehiscence. She is swollen to her abdomen and breasts. Overall pain has been tolerable.     Objective:  NAD, AAOx3  Breasts: bilateral swelling, incisions C/D/I, steri strips and suture ends removed  Back: sutures removed  Abdomen: Lower incision with small amount of dehiscence to medial portion, superficial sloughing, left drain with minimal output which was removed. Steri strips and suture ends removed. Earplug placed to umbilicus    Milly Ramsey PA-C

## 2024-04-15 ENCOUNTER — OFFICE VISIT (OUTPATIENT)
Dept: PLASTIC SURGERY | Facility: CLINIC | Age: 54
End: 2024-04-15

## 2024-04-15 DIAGNOSIS — N62 MACROMASTIA: Primary | ICD-10-CM

## 2024-04-15 PROCEDURE — 99024 POSTOP FOLLOW-UP VISIT: CPT | Performed by: PHYSICIAN ASSISTANT

## 2024-04-17 NOTE — PROGRESS NOTES
Assessment and Plan:  Kathy Thomas 53 y.o. female s/p bilateral breast reduction, abdominoplasty, back and flanks liposuction 3/14/24, presenting for post op visit    - patient healing well  - small opening to abdominal mid point. Continue to cover and use aquaphor   - foam roller to abdomen and back  - continue massage to incisions   - no longer needs ear plug in umbilicus  - small suture removed from abdomen  - return in 6-8 weeks     Subjective:    Patient has been healing well. She has a small opening to her midline abdominal incision. Suture end removed. Breasts are completely healed. She has been wearing her binder.      Objective:  NAD, AAOx3  Breasts: bilateral breast incisions C/D/I   Abdomen: small amount of superficial dehiscence to medial incision, inverted umbilicus, swelling to abdomen    Milly Ramsey PA-C

## 2024-04-23 ENCOUNTER — OFFICE VISIT (OUTPATIENT)
Dept: PLASTIC SURGERY | Facility: CLINIC | Age: 54
End: 2024-04-23

## 2024-04-23 DIAGNOSIS — N62 LARGE BREASTS: Primary | ICD-10-CM

## 2024-04-23 DIAGNOSIS — Z98.890 S/P COSMETIC PLASTIC SURGERY: ICD-10-CM

## 2024-04-23 PROCEDURE — 99024 POSTOP FOLLOW-UP VISIT: CPT | Performed by: PHYSICIAN ASSISTANT

## 2024-04-24 ENCOUNTER — TELEPHONE (OUTPATIENT)
Dept: PLASTIC SURGERY | Facility: CLINIC | Age: 54
End: 2024-04-24

## 2024-04-24 NOTE — TELEPHONE ENCOUNTER
Spoke with patient in regards to post op progress. Patient states she's doing well and has no concerns at this time. Advised patient to reach out if any arise. All questions answered and patient understood. Post op scheduled for june

## 2024-04-27 NOTE — PROGRESS NOTES
"  Assessment and Plan:  Kathy Thomas 53 y.o. female s/p bilateral breast reduction, abdominoplasty, back and flanks liposuction 3/14/24, presenting for post op visit    - lower abdominal incision healing well. Silver nitrate used to abdominal incision and umbilicus. Continue to massage umbilicus but no longer needs an ear plug  - encouraged patient to eat/drink a small amount of salt due to her feeling dizzy with exercise. Will reach out to patient tomorrow to see if she is feeling better  - continue foam roller to abdomen and back  - continue aquaphor to all incisions  - return in 6-8 weeks     Subjective:    Patient with concerns to her abdominal incision. Small opening to midline but superficial. Silver nitrate applied to abdominal incision and umbilicus. She has been feeling dizzy and \"drunk\" while she has been going for walks. She is eating a low salt diet so she was encouraged to increase her salt intake for a few days.      Objective:  NAD, AAOx3  Breasts: bilateral breast incisions C/D/I   Abdomen: small amount of superficial dehiscence to medial incision, inverted umbilicus, mild swelling to abdomen     Milly Ramsey PA-C        "

## 2024-06-05 ENCOUNTER — OFFICE VISIT (OUTPATIENT)
Dept: PLASTIC SURGERY | Facility: CLINIC | Age: 54
End: 2024-06-05

## 2024-06-05 DIAGNOSIS — N62 MACROMASTIA: Primary | ICD-10-CM

## 2024-06-05 PROCEDURE — 99024 POSTOP FOLLOW-UP VISIT: CPT | Performed by: PHYSICIAN ASSISTANT

## 2024-06-10 NOTE — PROGRESS NOTES
Assessment and Plan:  Kathy Thomas 53 y.o. female s/p bilateral breast reduction, abdominoplasty, back and flanks liposuction 3/14/24, presenting for post op visit    - incisions healed  - continue aquaphor and massage to incisions   - binder as tolerated as this will continue to help with swelling  - return in 3 months for 6 month post op visit with Dr. Boyd     Subjective:    Patient doing well. Incisions have healed. She has been wearing her compression garment.      Objective:  NAD, AAOx3  Breasts: bilateral breast incisions C/D/I   Abdomen: Incisions C/D/I, mild swelling, no erythema     Milly Ramsey PA-C

## 2024-08-12 ENCOUNTER — OFFICE VISIT (OUTPATIENT)
Dept: URGENT CARE | Facility: CLINIC | Age: 54
End: 2024-08-12
Payer: COMMERCIAL

## 2024-08-12 VITALS
DIASTOLIC BLOOD PRESSURE: 76 MMHG | RESPIRATION RATE: 20 BRPM | TEMPERATURE: 97.1 F | OXYGEN SATURATION: 99 % | HEART RATE: 81 BPM | SYSTOLIC BLOOD PRESSURE: 124 MMHG

## 2024-08-12 DIAGNOSIS — J20.8 VIRAL BRONCHITIS: Primary | ICD-10-CM

## 2024-08-12 PROCEDURE — G0382 LEV 3 HOSP TYPE B ED VISIT: HCPCS | Performed by: NURSE PRACTITIONER

## 2024-08-12 PROCEDURE — S9083 URGENT CARE CENTER GLOBAL: HCPCS | Performed by: NURSE PRACTITIONER

## 2024-08-12 RX ORDER — FLUTICASONE FUROATE, UMECLIDINIUM BROMIDE AND VILANTEROL TRIFENATATE 200; 62.5; 25 UG/1; UG/1; UG/1
1 POWDER RESPIRATORY (INHALATION) DAILY
Qty: 60 BLISTER | Refills: 0 | Status: SHIPPED | OUTPATIENT
Start: 2024-08-12 | End: 2024-09-11

## 2024-08-12 RX ORDER — ALBUTEROL SULFATE 90 UG/1
2 AEROSOL, METERED RESPIRATORY (INHALATION) EVERY 6 HOURS PRN
Qty: 8.5 G | Refills: 0 | Status: SHIPPED | OUTPATIENT
Start: 2024-08-12

## 2024-08-12 RX ORDER — BENZONATATE 200 MG/1
200 CAPSULE ORAL 3 TIMES DAILY PRN
Qty: 20 CAPSULE | Refills: 0 | Status: SHIPPED | OUTPATIENT
Start: 2024-08-12

## 2024-08-13 NOTE — PROGRESS NOTES
[facility]        NAME: Kathy Thomas is a 53 y.o. female  : 1970    MRN: [id number]  DATE: 2024  TIME: 7:52 AM    Assessment and Plan   Viral bronchitis [J20.8]  1. Viral bronchitis  fluticasone-umeclidinium-vilanterol (Trelegy Ellipta) 200-62.5-25 mcg/actuation AEPB inhaler    albuterol (ProAir HFA) 90 mcg/act inhaler    benzonatate (TESSALON) 200 MG capsule          Wheezing noted on exam.  Will start course of inhaled corticosteroids along with albuterol.  Tessalon for cough.  Follow-up PCP.  Patient agreement plan.  Patient Instructions     Follow up with PCP in 3-5 days.  Proceed to  ER if symptoms worsen.    Chief Complaint     Chief Complaint   Patient presents with    Cold Like Symptoms     Patient with cough since Thursday and now wheezing she states.  Her grandchild that she baby sits has pneumonia.Taking OTC Tylenol, Robitussin and Nyquil         History of Present Illness   Kathy Thomas presents to the clinic c/o    Cold Like Symptoms (Patient with cough since Thursday and now wheezing she states.  Her grandchild that she baby sits has pneumonia.Taking OTC Tylenol, Robitussin and Nyquil)    No fever chills or bodyaches.  No difficulty catching breath.  No chest pain.        Review of Systems   Review of Systems   All other systems reviewed and are negative.        Current Medications     Long-Term Medications   Medication Sig Dispense Refill    fluticasone-umeclidinium-vilanterol (Trelegy Ellipta) 200-62.5-25 mcg/actuation AEPB inhaler Inhale 1 puff daily Rinse mouth after use. 60 blister 0    Ascorbic Acid (vitamin C) 1000 MG tablet Take 1,000 mg by mouth daily      cholecalciferol (VITAMIN D3) 1,000 units tablet Take 1,000 Units by mouth daily      ergocalciferol (VITAMIN D2) 50,000 units Take 1 capsule (50,000 Units total) by mouth once a week 12 capsule 0    gabapentin (Neurontin) 300 mg capsule Take 1 capsule (300 mg total) by mouth 3 (three) times a day for 5  days 15 capsule 0    ibuprofen (MOTRIN) 800 mg tablet Take 1 tablet (800 mg total) by mouth every 8 (eight) hours as needed for mild pain (DO NOT BEGIN UNTIL 48 HOURS AFTER SURGERY) 15 tablet 0    levothyroxine (Synthroid) 50 mcg tablet Take 1 tablet (50 mcg total) by mouth daily 90 tablet 1    sertraline (Zoloft) 100 mg tablet One tablet daily. 90 tablet 0       Current Allergies     Allergies as of 08/12/2024    (No Known Allergies)            The following portions of the patient's history were reviewed and updated as appropriate: allergies, current medications, past family history, past medical history, past social history, past surgical history and problem list.    Objective   /76   Pulse 81   Temp (!) 97.1 °F (36.2 °C) (Tympanic)   Resp 20   LMP 11/14/2018 (Approximate) Comment: Pt states it may have been the first two weeks in november.   SpO2 99%        Physical Exam     Physical Exam  Vitals and nursing note reviewed.   Constitutional:       Appearance: Normal appearance. She is well-developed.   HENT:      Head: Normocephalic and atraumatic.      Right Ear: Hearing, tympanic membrane, ear canal and external ear normal.      Left Ear: Hearing, tympanic membrane, ear canal and external ear normal.      Nose: Nose normal.      Mouth/Throat:      Lips: Pink.      Mouth: Mucous membranes are moist.      Pharynx: Oropharynx is clear.   Eyes:      General: Lids are normal.      Conjunctiva/sclera: Conjunctivae normal.      Pupils: Pupils are equal, round, and reactive to light.   Cardiovascular:      Rate and Rhythm: Normal rate and regular rhythm.      Heart sounds: Normal heart sounds, S1 normal and S2 normal.   Pulmonary:      Effort: Pulmonary effort is normal.      Breath sounds: Wheezing present.   Abdominal:      General: Abdomen is flat. Bowel sounds are normal.      Palpations: Abdomen is soft.   Musculoskeletal:         General: Normal range of motion.      Cervical back: Full passive range  of motion without pain, normal range of motion and neck supple.   Skin:     General: Skin is warm and dry.   Neurological:      General: No focal deficit present.      Mental Status: She is alert and oriented to person, place, and time.   Psychiatric:         Mood and Affect: Mood normal.         Speech: Speech normal.         Behavior: Behavior normal. Behavior is cooperative.         Thought Content: Thought content normal.         Judgment: Judgment normal.

## 2024-08-30 ENCOUNTER — OFFICE VISIT (OUTPATIENT)
Dept: URGENT CARE | Facility: CLINIC | Age: 54
End: 2024-08-30
Payer: COMMERCIAL

## 2024-08-30 VITALS
SYSTOLIC BLOOD PRESSURE: 118 MMHG | DIASTOLIC BLOOD PRESSURE: 84 MMHG | OXYGEN SATURATION: 98 % | TEMPERATURE: 98 F | RESPIRATION RATE: 18 BRPM | HEART RATE: 98 BPM

## 2024-08-30 DIAGNOSIS — J01.90 ACUTE SINUSITIS, RECURRENCE NOT SPECIFIED, UNSPECIFIED LOCATION: ICD-10-CM

## 2024-08-30 DIAGNOSIS — H10.30 ACUTE CONJUNCTIVITIS, UNSPECIFIED ACUTE CONJUNCTIVITIS TYPE, UNSPECIFIED LATERALITY: ICD-10-CM

## 2024-08-30 DIAGNOSIS — H66.92 LEFT OTITIS MEDIA, UNSPECIFIED OTITIS MEDIA TYPE: Primary | ICD-10-CM

## 2024-08-30 PROCEDURE — G0381 LEV 2 HOSP TYPE B ED VISIT: HCPCS | Performed by: PHYSICIAN ASSISTANT

## 2024-08-30 PROCEDURE — S9083 URGENT CARE CENTER GLOBAL: HCPCS | Performed by: PHYSICIAN ASSISTANT

## 2024-08-30 RX ORDER — TOBRAMYCIN 3 MG/ML
1 SOLUTION/ DROPS OPHTHALMIC 3 TIMES DAILY
Qty: 5 ML | Refills: 0 | Status: SHIPPED | OUTPATIENT
Start: 2024-08-30 | End: 2024-09-04

## 2024-08-30 RX ORDER — PREDNISONE 20 MG/1
TABLET ORAL
Qty: 10 TABLET | Refills: 0 | Status: SHIPPED | OUTPATIENT
Start: 2024-08-30

## 2024-08-30 NOTE — PATIENT INSTRUCTIONS
Take antibiotic as instructed.  Take prednisone as instructed.  While on prednisone do not take any ibuprofen or ibuprofen like products.  May safely take Tylenol if needed.  May continue inhaler as needed.    May benefit from starting Flonase ( or equivalent product) and using daily for next 2-3 weeks.      Use eye drops as instructed.    In light of persistent symptoms do recommend that you contact your primary care offices soon as possible to arrange follow-up evaluation for approximately 7 to 10 days.    If you would develop any significant weakness, shortness of breath, chest pain please proceed immediately to emergency room for further evaluation.

## 2024-08-30 NOTE — PROGRESS NOTES
[facility]    NAME: Kathy Thomas is a 53 y.o. female  : 1970    MRN: [id number]  DATE: 2024  TIME: 9:48 AM    Assessment and Plan   Left otitis media, unspecified otitis media type [H66.92]  1. Left otitis media, unspecified otitis media type  amoxicillin-clavulanate (AUGMENTIN) 875-125 mg per tablet    predniSONE 20 mg tablet      2. Acute sinusitis, recurrence not specified, unspecified location  amoxicillin-clavulanate (AUGMENTIN) 875-125 mg per tablet    predniSONE 20 mg tablet      3. Acute conjunctivitis, unspecified acute conjunctivitis type, unspecified laterality  tobramycin (Tobrex) 0.3 % SOLN          Patient Instructions     Patient Instructions   Take antibiotic as instructed.  Take prednisone as instructed.  While on prednisone do not take any ibuprofen or ibuprofen like products.  May safely take Tylenol if needed.  May continue inhaler as needed.    May benefit from starting Flonase ( or equivalent product) and using daily for next 2-3 weeks.      Use eye drops as instructed.    In light of persistent symptoms do recommend that you contact your primary care offices soon as possible to arrange follow-up evaluation for approximately 7 to 10 days.    If you would develop any significant weakness, shortness of breath, chest pain please proceed immediately to emergency room for further evaluation.      Chief Complaint     Chief Complaint   Patient presents with    Cough     Started 3 weeks ago. Had bronchitis and never got better. Took musinex, nyquil. Nothing seems to help. Other prescribed med. ran out. Possible ear infection in right ear and sore throat. Ear feels clogged    Conjunctivitis     Started this morning in right eye. Itchy and irritated. Used cold compress on drive here.        History of Present Illness   Kathy Thomas presents to the clinic c/o  53-year-old female comes in with persistent bronchial symptoms, cough, congestion and now having discomfort in  her right ear with clogged sensation.  Today she started to notice some irritation of her right eye.    Sore throat, green mucous, clogged R. Ear.  Now eye glued shut this morning.    Does not wear contacts.    Non Smoker.  History of asthma:  No.  History of pneumonia: No.    Seen earlier in month and treated with Tessalon Perles, albuterol inhaler and Trelegy.  Was getting better and now symptoms returning.  Took vacation to [place] and thought the ocean air did her well.  Then boyfriend got sick and now she is getting sick again.  No chest pain or shortness of breath.  No wheezing.  Some cough.    Cough  Associated symptoms include ear pain, eye redness, postnasal drip and a sore throat. Pertinent negatives include no chills, fever, rhinorrhea, shortness of breath or wheezing.   Conjunctivitis   Associated symptoms include congestion, ear pain, sore throat, cough, eye discharge and eye redness. Pertinent negatives include no fever, no ear discharge, no rhinorrhea and no wheezing.       Review of Systems   Review of Systems   Constitutional:  Positive for activity change, appetite change and fatigue. Negative for chills, diaphoresis and fever.   HENT:  Positive for congestion, ear pain, postnasal drip and sore throat. Negative for ear discharge, rhinorrhea, sinus pressure and sinus pain.    Eyes:  Positive for discharge and redness.   Respiratory:  Positive for cough. Negative for chest tightness, shortness of breath and wheezing.    Cardiovascular: Negative.    Hematological: Negative.        Current Medications     Long-Term Medications   Medication Sig Dispense Refill    Ascorbic Acid (vitamin C) 1000 MG tablet Take 1,000 mg by mouth daily      cholecalciferol (VITAMIN D3) 1,000 units tablet Take 1,000 Units by mouth daily      ergocalciferol (VITAMIN D2) 50,000 units Take 1 capsule (50,000 Units total) by mouth once a week 12 capsule 0    fluticasone-umeclidinium-vilanterol (Trelegy Ellipta) 200-62.5-25  mcg/actuation AEPB inhaler Inhale 1 puff daily Rinse mouth after use. 60 blister 0    gabapentin (Neurontin) 300 mg capsule Take 1 capsule (300 mg total) by mouth 3 (three) times a day for 5 days 15 capsule 0    ibuprofen (MOTRIN) 800 mg tablet Take 1 tablet (800 mg total) by mouth every 8 (eight) hours as needed for mild pain (DO NOT BEGIN UNTIL 48 HOURS AFTER SURGERY) 15 tablet 0    levothyroxine (Synthroid) 50 mcg tablet Take 1 tablet (50 mcg total) by mouth daily 90 tablet 1    sertraline (Zoloft) 100 mg tablet One tablet daily. 90 tablet 0       Current Allergies     Allergies as of 08/30/2024    (No Known Allergies)          The following portions of the patient's history were reviewed and updated as appropriate: allergies, current medications, past family history, past medical history, past social history, past surgical history and problem list.  Past Medical History:   Diagnosis Date    Disease of thyroid gland     Enlarged uterus 09/25/2017    Fibroid     uterine    GERD (gastroesophageal reflux disease)     resolved on way    Hot flashes     at hs    Irregular menses     heavy bleeding, painful    Right flank pain     [id number] RESOLVED    Uterine leiomyoma 11/23/2018    Added automatically from request for surgery [id number]    Wears contact lenses     and glasses     Past Surgical History:   Procedure Laterality Date    COLONOSCOPY      CYSTOSCOPY N/A 12/04/2018    Procedure: CYSTOSCOPY;  Surgeon: Rohit Turk DO;  Location: [facility];  Service: Gynecology    WV BREAST REDUCTION Bilateral 3/14/2024    Procedure: BREAST REDUCTION;  Surgeon: Josefina Boyd DO;  Location:  MAIN OR;  Service: Plastics    WV EXCISION EXCESSIVE SKIN & SUBQ TISSUE ABDOMEN N/A 3/14/2024    Procedure: ABDOMINOPLASTY;  Surgeon: Josefina Boyd DO;  Location:  MAIN OR;  Service: Plastics    WV LAPS SUPRACRV HYSTERECT 250 GM/< RMVL TUBE/OVAR Bilateral 12/04/2018    Procedure: HYSTERECTOMY LAPAROSCOPIC  SUPRACERVICAL (LSH) AND REMOVAL OF BOTH TUBES;  Surgeon: Rohit Turk DO;  Location: [facility];  Service: Gynecology    NH SUCTION ASSISTED LIPECTOMY TRUNK N/A 3/14/2024    Procedure: LIPOSUCTION TO BACK & FLANKS;  Surgeon: Josefina Boyd DO;  Location:  MAIN OR;  Service: Plastics    VAGINAL DELIVERY      WISDOM TOOTH EXTRACTION       Family History   Problem Relation Age of Onset    Breast cancer Mother 68    No Known Problems Father     No Known Problems Daughter     Ovarian cancer Maternal Grandmother 84    Colon cancer Maternal Grandfather 84    Lung cancer Paternal Grandmother 88    No Known Problems Paternal Grandfather     No Known Problems Maternal Aunt     Breast cancer Maternal Aunt 40    No Known Problems Paternal Aunt        Objective   /84   Pulse 98   Temp 98 °F (36.7 °C) (Tympanic)   Resp 18   LMP 11/14/2018 (Approximate) Comment: Pt states it may have been the first two weeks in november.   SpO2 98%   Patient's last menstrual period was 11/14/2018 (approximate).       Physical Exam     Physical Exam  Vitals and nursing note reviewed.   Constitutional:       General: She is not in acute distress.     Appearance: She is well-developed. She is ill-appearing. She is not toxic-appearing or diaphoretic.      Comments: Appears mildly ill but in no acute distress.  No trismus or conversational dyspnea.   HENT:      Head: Normocephalic and atraumatic.      Right Ear: Ear canal and external ear normal.      Left Ear: Tympanic membrane, ear canal and external ear normal.      Ears:      Comments: Right TM red and bulging.     Nose: Congestion and rhinorrhea present.      Mouth/Throat:      Mouth: Mucous membranes are moist.      Pharynx: Posterior oropharyngeal erythema present. No oropharyngeal exudate.      Comments: Cobblestoning posterior pharynx with patchy redness  Eyes:      General:         Right eye: No discharge.         Left eye: No discharge.      Conjunctiva/sclera:  Conjunctivae normal.      Pupils: Pupils are equal, round, and reactive to light.   Cardiovascular:      Rate and Rhythm: Normal rate and regular rhythm.      Heart sounds: Normal heart sounds. No murmur heard.     No friction rub. No gallop.   Pulmonary:      Effort: Pulmonary effort is normal. No respiratory distress.      Breath sounds: Normal breath sounds. No stridor. No wheezing, rhonchi or rales.   Musculoskeletal:      Cervical back: Normal range of motion and neck supple. No rigidity or tenderness.   Lymphadenopathy:      Cervical: No cervical adenopathy.   Skin:     General: Skin is warm and dry.      Coloration: Skin is not jaundiced or pale.      Findings: No rash.   Neurological:      Mental Status: She is alert and oriented to person, place, and time.   Psychiatric:         Mood and Affect: Mood normal.         Behavior: Behavior normal.

## 2024-09-04 ENCOUNTER — OFFICE VISIT (OUTPATIENT)
Dept: PLASTIC SURGERY | Facility: CLINIC | Age: 54
End: 2024-09-04

## 2024-09-04 DIAGNOSIS — Z98.890 S/P COSMETIC PLASTIC SURGERY: Primary | ICD-10-CM

## 2024-09-04 PROCEDURE — RECHECK: Performed by: PHYSICIAN ASSISTANT

## 2024-09-04 NOTE — PROGRESS NOTES
Assessment/Plan:     Patient is a 53 YOF who is status post bilateral breast reduction, lipoabdominoplasty and liposuction to back and flanks by Dr. Boyd on 3/14/24. Please see HPI.     Patient returns to the office today for scar check.   was also present for the visit today.  They did discuss potential skin revision in the office.  Patient will return to the office in approximately 9 months postop for a scar check and surgical planning.     Diagnoses and all orders for this visit:    S/P cosmetic plastic surgery          Subjective:     Patient ID: Kathy Thomas is a 53 y.o. female.    HPI    Reports that she is pleased with her surgical results.   discussed small areas of revision of the right breast and potentially abdominal dogears.    Review of Systems    See HPI     Objective:     Physical Exam      All incisions are clean, dry and intact.  Mild thickening of central horizontal abdominal scar.

## 2024-09-16 DIAGNOSIS — E03.9 ACQUIRED HYPOTHYROIDISM: ICD-10-CM

## 2024-09-18 RX ORDER — LEVOTHYROXINE SODIUM 50 UG/1
50 TABLET ORAL DAILY
Qty: 90 TABLET | Refills: 0 | Status: SHIPPED | OUTPATIENT
Start: 2024-09-18

## 2024-09-20 DIAGNOSIS — Z13.1 DIABETES MELLITUS SCREENING: ICD-10-CM

## 2024-09-20 DIAGNOSIS — Z13.6 ENCOUNTER FOR LIPID SCREENING FOR CARDIOVASCULAR DISEASE: ICD-10-CM

## 2024-09-20 DIAGNOSIS — Z13.220 ENCOUNTER FOR LIPID SCREENING FOR CARDIOVASCULAR DISEASE: ICD-10-CM

## 2024-09-20 DIAGNOSIS — E03.9 ACQUIRED HYPOTHYROIDISM: Primary | ICD-10-CM

## 2024-10-16 ENCOUNTER — APPOINTMENT (OUTPATIENT)
Dept: LAB | Facility: CLINIC | Age: 54
End: 2024-10-16
Payer: COMMERCIAL

## 2024-10-16 DIAGNOSIS — E03.9 ACQUIRED HYPOTHYROIDISM: ICD-10-CM

## 2024-10-16 DIAGNOSIS — Z13.220 ENCOUNTER FOR LIPID SCREENING FOR CARDIOVASCULAR DISEASE: ICD-10-CM

## 2024-10-16 DIAGNOSIS — Z13.1 DIABETES MELLITUS SCREENING: ICD-10-CM

## 2024-10-16 DIAGNOSIS — Z13.6 ENCOUNTER FOR LIPID SCREENING FOR CARDIOVASCULAR DISEASE: ICD-10-CM

## 2024-10-16 LAB
ALBUMIN SERPL BCG-MCNC: 3.5 G/DL (ref 3.5–5)
ALP SERPL-CCNC: 69 U/L (ref 34–104)
ALT SERPL W P-5'-P-CCNC: 17 U/L (ref 7–52)
ANION GAP SERPL CALCULATED.3IONS-SCNC: 8 MMOL/L (ref 4–13)
AST SERPL W P-5'-P-CCNC: 17 U/L (ref 13–39)
BASOPHILS # BLD AUTO: 0.08 THOUSANDS/ΜL (ref 0–0.1)
BASOPHILS NFR BLD AUTO: 1 % (ref 0–1)
BILIRUB SERPL-MCNC: 0.62 MG/DL (ref 0.2–1)
BUN SERPL-MCNC: 14 MG/DL (ref 5–25)
CALCIUM SERPL-MCNC: 8.6 MG/DL (ref 8.4–10.2)
CHLORIDE SERPL-SCNC: 104 MMOL/L (ref 96–108)
CHOLEST SERPL-MCNC: 232 MG/DL
CO2 SERPL-SCNC: 29 MMOL/L (ref 21–32)
CREAT SERPL-MCNC: 0.7 MG/DL (ref 0.6–1.3)
EOSINOPHIL # BLD AUTO: 0.33 THOUSAND/ΜL (ref 0–0.61)
EOSINOPHIL NFR BLD AUTO: 4 % (ref 0–6)
ERYTHROCYTE [DISTWIDTH] IN BLOOD BY AUTOMATED COUNT: 14.8 % (ref 11.6–15.1)
EST. AVERAGE GLUCOSE BLD GHB EST-MCNC: 117 MG/DL
GFR SERPL CREATININE-BSD FRML MDRD: 99 ML/MIN/1.73SQ M
GLUCOSE P FAST SERPL-MCNC: 87 MG/DL (ref 65–99)
HBA1C MFR BLD: 5.7 %
HCT VFR BLD AUTO: 40 % (ref 34.8–46.1)
HDLC SERPL-MCNC: 63 MG/DL
HGB BLD-MCNC: 12.9 G/DL (ref 11.5–15.4)
IMM GRANULOCYTES # BLD AUTO: 0.04 THOUSAND/UL (ref 0–0.2)
IMM GRANULOCYTES NFR BLD AUTO: 1 % (ref 0–2)
LDLC SERPL CALC-MCNC: 135 MG/DL (ref 0–100)
LYMPHOCYTES # BLD AUTO: 2.84 THOUSANDS/ΜL (ref 0.6–4.47)
LYMPHOCYTES NFR BLD AUTO: 33 % (ref 14–44)
MCH RBC QN AUTO: 31.5 PG (ref 26.8–34.3)
MCHC RBC AUTO-ENTMCNC: 32.3 G/DL (ref 31.4–37.4)
MCV RBC AUTO: 98 FL (ref 82–98)
MONOCYTES # BLD AUTO: 0.89 THOUSAND/ΜL (ref 0.17–1.22)
MONOCYTES NFR BLD AUTO: 10 % (ref 4–12)
NEUTROPHILS # BLD AUTO: 4.45 THOUSANDS/ΜL (ref 1.85–7.62)
NEUTS SEG NFR BLD AUTO: 51 % (ref 43–75)
NRBC BLD AUTO-RTO: 0 /100 WBCS
PLATELET # BLD AUTO: 286 THOUSANDS/UL (ref 149–390)
PMV BLD AUTO: 9.6 FL (ref 8.9–12.7)
POTASSIUM SERPL-SCNC: 4.3 MMOL/L (ref 3.5–5.3)
PROT SERPL-MCNC: 6.5 G/DL (ref 6.4–8.4)
RBC # BLD AUTO: 4.09 MILLION/UL (ref 3.81–5.12)
SODIUM SERPL-SCNC: 141 MMOL/L (ref 135–147)
TRIGL SERPL-MCNC: 172 MG/DL
TSH SERPL DL<=0.05 MIU/L-ACNC: 4.2 UIU/ML (ref 0.45–4.5)
WBC # BLD AUTO: 8.63 THOUSAND/UL (ref 4.31–10.16)

## 2024-10-16 PROCEDURE — 84443 ASSAY THYROID STIM HORMONE: CPT

## 2024-10-16 PROCEDURE — 80061 LIPID PANEL: CPT

## 2024-10-16 PROCEDURE — 80053 COMPREHEN METABOLIC PANEL: CPT

## 2024-10-16 PROCEDURE — 85025 COMPLETE CBC W/AUTO DIFF WBC: CPT

## 2024-10-16 PROCEDURE — 36415 COLL VENOUS BLD VENIPUNCTURE: CPT

## 2024-10-16 PROCEDURE — 83036 HEMOGLOBIN GLYCOSYLATED A1C: CPT

## 2024-10-17 ENCOUNTER — OFFICE VISIT (OUTPATIENT)
Dept: FAMILY MEDICINE CLINIC | Facility: CLINIC | Age: 54
End: 2024-10-17
Payer: COMMERCIAL

## 2024-10-17 VITALS
OXYGEN SATURATION: 98 % | TEMPERATURE: 97.9 F | DIASTOLIC BLOOD PRESSURE: 80 MMHG | WEIGHT: 192 LBS | HEIGHT: 64 IN | HEART RATE: 59 BPM | SYSTOLIC BLOOD PRESSURE: 118 MMHG | BODY MASS INDEX: 32.78 KG/M2

## 2024-10-17 DIAGNOSIS — E03.9 ACQUIRED HYPOTHYROIDISM: ICD-10-CM

## 2024-10-17 DIAGNOSIS — Z00.00 ANNUAL PHYSICAL EXAM: Primary | ICD-10-CM

## 2024-10-17 PROCEDURE — 99396 PREV VISIT EST AGE 40-64: CPT | Performed by: FAMILY MEDICINE

## 2024-10-17 NOTE — PATIENT INSTRUCTIONS
"Patient Education     Routine physical for adults   The Basics   Written by the doctors and editors at [facility]   What is a physical? -- A physical is a routine visit, or \"check-up,\" with your doctor. You might also hear it called a \"wellness visit\" or \"preventive visit.\"  During each visit, the doctor will:   Ask about your physical and mental health   Ask about your habits, behaviors, and lifestyle   Do an exam   Give you vaccines if needed   Talk to you about any medicines you take   Give advice about your health   Answer your questions  Getting regular check-ups is an important part of taking care of your health. It can help your doctor find and treat any problems you have. But it's also important for preventing health problems.  A routine physical is different from a \"sick visit.\" A sick visit is when you see a doctor because of a health concern or problem. Since physicals are scheduled ahead of time, you can think about what you want to ask the doctor.  How often should I get a physical? -- It depends on your age and health. In general, for people age 21 years and older:   If you are younger than 50 years, you might be able to get a physical every 3 years.   If you are 50 years or older, your doctor might recommend a physical every year.  If you have an ongoing health condition, like diabetes or high blood pressure, your doctor will probably want to see you more often.  What happens during a physical? -- In general, each visit will include:   Physical exam - The doctor or nurse will check your height, weight, heart rate, and blood pressure. They will also look at your eyes and ears. They will ask about how you are feeling and whether you have any symptoms that bother you.   Medicines - It's a good idea to bring a list of all the medicines you take to each doctor visit. Your doctor will talk to you about your medicines and answer any questions. Tell them if you are having any side effects that bother you. You " "should also tell them if you are having trouble paying for any of your medicines.   Habits and behaviors - This includes:   Your diet   Your exercise habits   Whether you smoke, drink alcohol, or use drugs   Whether you are sexually active   Whether you feel safe at home  Your doctor will talk to you about things you can do to improve your health and lower your risk of health problems. They will also offer help and support. For example, if you want to quit smoking, they can give you advice and might prescribe medicines. If you want to improve your diet or get more physical activity, they can help you with this, too.   Lab tests, if needed - The tests you get will depend on your age and situation. For example, your doctor might want to check your:   Cholesterol   Blood sugar   Iron level   Vaccines - The recommended vaccines will depend on your age, health, and what vaccines you already had. Vaccines are very important because they can prevent certain serious or deadly infections.   Discussion of screening - \"Screening\" means checking for diseases or other health problems before they cause symptoms. Your doctor can recommend screening based on your age, risk, and preferences. This might include tests to check for:   Cancer, such as breast, prostate, cervical, ovarian, colorectal, prostate, lung, or skin cancer   Sexually transmitted infections, such as chlamydia and gonorrhea   Mental health conditions like depression and anxiety  Your doctor will talk to you about the different types of screening tests. They can help you decide which screenings to have. They can also explain what the results might mean.   Answering questions - The physical is a good time to ask the doctor or nurse questions about your health. If needed, they can refer you to other doctors or specialists, too.  Adults older than 65 years often need other care, too. As you get older, your doctor will talk to you about:   How to prevent falling at " home   Hearing or vision tests   Memory testing   How to take your medicines safely   Making sure that you have the help and support you need at home  All topics are updated as new evidence becomes available and our peer review process is complete.  This topic retrieved from [employer] on: May 02, 2024.  Topic 019102 Version 1.0  Release: 32.4.3 - C32.122  © 2024 UpToDate, Inc. and/or its affiliates. All rights reserved.  Consumer Information Use and Disclaimer   Disclaimer: This generalized information is a limited summary of diagnosis, treatment, and/or medication information. It is not meant to be comprehensive and should be used as a tool to help the user understand and/or assess potential diagnostic and treatment options. It does NOT include all information about conditions, treatments, medications, side effects, or risks that may apply to a specific patient. It is not intended to be medical advice or a substitute for the medical advice, diagnosis, or treatment of a health care provider based on the health care provider's examination and assessment of a patient's specific and unique circumstances. Patients must speak with a health care provider for complete information about their health, medical questions, and treatment options, including any risks or benefits regarding use of medications. This information does not endorse any treatments or medications as safe, effective, or approved for treating a specific patient. UpToDate, Inc. and its affiliates disclaim any warranty or liability relating to this information or the use thereof.The use of this information is governed by the Terms of Use, available at https://www.woltersRupeeTimesuwer.com/en/know/clinical-effectiveness-terms. 2024© UpToDate, Inc. and its affiliates and/or licensors. All rights reserved.  Copyright   © 2024 UpToDate, Inc. and/or its affiliates. All rights reserved.

## 2024-10-17 NOTE — PROGRESS NOTES
Adult Annual Physical  Name: Kathy Thomas      : 1970      MRN: [id number]  Encounter Provider: Joy Pantoja DO  Encounter Date: 10/17/2024   Encounter department: [facility]    Assessment & Plan  Annual physical exam  Patient is a 54 year old female seen for physical exam.  Fasting blood work shows decrease in A1c from 6.0 to 5.7  Chol up at 232 - had been 123        Acquired hypothyroidism  TSH in range. Continue same dose of Levothyrxoine.         Immunizations and preventive care screenings were discussed with patient today. Appropriate education was printed on patient's after visit summary.    Counseling:  Alcohol/drug use: discussed moderation in alcohol intake, the recommendations for healthy alcohol use, and avoidance of illicit drug use.  Dental Health: discussed importance of regular tooth brushing, flossing, and dental visits.  Exercise: the importance of regular exercise/physical activity was discussed. Recommend exercise 3-5 times per week for at least 30 minutes.       Depression Screening and Follow-up Plan: Patient was screened for depression during today's encounter. They screened negative with a PHQ-2 score of 0.        History of Present Illness     Adult Annual Physical:  Patient presents for annual physical.     Diet and Physical Activity:  - Diet/Nutrition: well balanced diet.  - Exercise:. rowing machine    Depression Screening:  - PHQ-2 Score: 0    General Health:  - Sleep: sleeps well.  - Hearing: normal hearing bilateral ears.  - Vision: no vision problems.  - Dental: regular dental visits.    /GYN Health:  - Follows with GYN: yes.   - Menopause: postmenopausal.     Review of Systems   Constitutional:  Negative for chills and fever.   HENT:  Negative for congestion and sore throat.    Respiratory:  Negative for chest tightness.    Cardiovascular:  Negative for chest pain and palpitations.   Gastrointestinal:  Negative for abdominal pain, constipation,  "diarrhea and nausea.   Genitourinary:  Negative for difficulty urinating.   Skin: Negative.    Neurological:  Negative for dizziness and headaches.   Psychiatric/Behavioral: Negative.           Objective     /80 (BP Location: Left arm, Patient Position: Sitting, Cuff Size: Large)   Pulse 59   Temp 97.9 °F (36.6 °C) (Temporal)   Ht 5' 3.5\" (1.613 m)   Wt 87.1 kg (192 lb)   LMP 11/14/2018 (Approximate) Comment: Pt states it may have been the first two weeks in november.   SpO2 98%   BMI 33.48 kg/m²     Physical Exam  Vitals and nursing note reviewed.   Constitutional:       General: She is not in acute distress.     Appearance: She is well-developed.   HENT:      Head: Normocephalic.      Right Ear: Tympanic membrane normal.      Left Ear: Tympanic membrane normal.      Mouth/Throat:      Pharynx: No posterior oropharyngeal erythema.   Eyes:      General: No scleral icterus.  Neck:      Thyroid: No thyromegaly.      Vascular: No carotid bruit.   Cardiovascular:      Rate and Rhythm: Normal rate and regular rhythm.      Heart sounds: Normal heart sounds. No murmur heard.  Pulmonary:      Effort: Pulmonary effort is normal.      Breath sounds: Normal breath sounds.   Abdominal:      General: Bowel sounds are normal.      Palpations: Abdomen is soft.   Musculoskeletal:      Right lower leg: No edema.      Left lower leg: No edema.   Lymphadenopathy:      Cervical: No cervical adenopathy.   Skin:     General: Skin is warm and dry.      Findings: Lesion (upper leftskin lesion) present.   Neurological:      Mental Status: She is alert and oriented to person, place, and time.   Psychiatric:         Mood and Affect: Mood normal.         "

## 2024-12-04 ENCOUNTER — TELEPHONE (OUTPATIENT)
Age: 54
End: 2024-12-04

## 2024-12-04 NOTE — TELEPHONE ENCOUNTER
Patient called to lisa today's apt but there are no available appointments with Dr Boyd to offer  If you can please call patient back to reschedule her apt  Thank you

## 2024-12-11 ENCOUNTER — OFFICE VISIT (OUTPATIENT)
Dept: FAMILY MEDICINE CLINIC | Facility: CLINIC | Age: 54
End: 2024-12-11
Payer: COMMERCIAL

## 2024-12-11 VITALS
DIASTOLIC BLOOD PRESSURE: 80 MMHG | HEART RATE: 80 BPM | TEMPERATURE: 98 F | HEIGHT: 64 IN | OXYGEN SATURATION: 98 % | BODY MASS INDEX: 33.12 KG/M2 | WEIGHT: 194 LBS | SYSTOLIC BLOOD PRESSURE: 120 MMHG

## 2024-12-11 DIAGNOSIS — L98.9 CHANGING SKIN LESION: Primary | ICD-10-CM

## 2024-12-11 DIAGNOSIS — L82.0 INFLAMED SEBORRHEIC KERATOSIS: ICD-10-CM

## 2024-12-11 PROCEDURE — 99213 OFFICE O/P EST LOW 20 MIN: CPT | Performed by: FAMILY MEDICINE

## 2024-12-11 PROCEDURE — 11302 SHAVE SKIN LESION 1.1-2.0 CM: CPT | Performed by: FAMILY MEDICINE

## 2024-12-11 PROCEDURE — 88305 TISSUE EXAM BY PATHOLOGIST: CPT | Performed by: PATHOLOGY

## 2024-12-11 NOTE — PROGRESS NOTES
Shave lesion    Date/Time: 12/11/2024 11:15 AM    Performed by: Joy Pantoja DO  Authorized by: Joy Pantoja DO  Universal Protocol:  procedure performed by consultantConsent: Verbal consent obtained.  Risks and benefits: risks, benefits and alternatives were discussed  Consent given by: patient  Patient understanding: patient states understanding of the procedure being performed  Patient identity confirmed: verbally with patient    Number of Lesions: 1  Lesion 1:     Body area: trunk    Trunk location: back    Initial size (mm): 14    Final defect size (mm): 16    Malignancy: benign lesion      Destruction method: shave removal      Cosmetic: No, lesion changing, inflamed.      Comments:  Patient with lesion changig on back, inflamed. About 14 x 10 mm. !% xylocaine with epi for local anesthesia. Skin cleansed with betadine. Lesion shaved and sent to path. Electrocautery used for hemostasis. Instructions given for wound care.

## 2024-12-13 PROCEDURE — 88305 TISSUE EXAM BY PATHOLOGIST: CPT | Performed by: PATHOLOGY

## 2024-12-14 ENCOUNTER — RESULTS FOLLOW-UP (OUTPATIENT)
Dept: FAMILY MEDICINE CLINIC | Facility: CLINIC | Age: 54
End: 2024-12-14

## 2024-12-26 DIAGNOSIS — E03.9 ACQUIRED HYPOTHYROIDISM: ICD-10-CM

## 2024-12-27 RX ORDER — LEVOTHYROXINE SODIUM 50 UG/1
50 TABLET ORAL DAILY
Qty: 90 TABLET | Refills: 1 | Status: SHIPPED | OUTPATIENT
Start: 2024-12-27

## 2025-01-13 ENCOUNTER — ANNUAL EXAM (OUTPATIENT)
Dept: OBGYN CLINIC | Facility: CLINIC | Age: 55
End: 2025-01-13
Payer: COMMERCIAL

## 2025-01-13 VITALS
HEIGHT: 63 IN | BODY MASS INDEX: 34.2 KG/M2 | SYSTOLIC BLOOD PRESSURE: 118 MMHG | WEIGHT: 193 LBS | DIASTOLIC BLOOD PRESSURE: 70 MMHG

## 2025-01-13 DIAGNOSIS — Z01.419 ENCOUNTER FOR WELL WOMAN EXAM WITH ROUTINE GYNECOLOGICAL EXAM: ICD-10-CM

## 2025-01-13 DIAGNOSIS — Z12.31 ENCOUNTER FOR SCREENING MAMMOGRAM FOR MALIGNANT NEOPLASM OF BREAST: Primary | ICD-10-CM

## 2025-01-13 PROCEDURE — G0145 SCR C/V CYTO,THINLAYER,RESCR: HCPCS | Performed by: OBSTETRICS & GYNECOLOGY

## 2025-01-13 PROCEDURE — G0476 HPV COMBO ASSAY CA SCREEN: HCPCS | Performed by: OBSTETRICS & GYNECOLOGY

## 2025-01-13 PROCEDURE — S0612 ANNUAL GYNECOLOGICAL EXAMINA: HCPCS | Performed by: OBSTETRICS & GYNECOLOGY

## 2025-01-13 NOTE — PROGRESS NOTES
ASSESSMENT & PLAN: Kathy Thomas is a 54 y.o.  with normal gynecologic exam.    1.  Routine well woman exam done today  2.  Pap and HPV:  The patient's last pap and hpv was .    It was normal.    Pap with cotesting was done today.    Current ASCCP Guidelines reviewed.   3.  Mammogram ordered  4.  Colorectal cancer screening was not ordered.  5. The following were reviewed in today's visit: breast self exam, mammography screening ordered, menopause, exercise, and healthy diet      CC:  Annual Gynecologic Examination    HPI: Kathy Thomas is a 54 y.o.  who presents for annual gynecologic examination.  She has the following concerns:  none    Health Maintenance:    She wears her seatbelt routinely.    She does perform regular monthly self breast exams.    She feels safe at home.       Past Medical History:   Diagnosis Date    Disease of thyroid gland     Enlarged uterus 2017    Fibroid     uterine    GERD (gastroesophageal reflux disease)     resolved on way    Hot flashes     at hs    Irregular menses     heavy bleeding, painful    Right flank pain     2017 RESOLVED    Uterine leiomyoma 2018    Added automatically from request for surgery [id number]    Wears contact lenses     and glasses       Past Surgical History:   Procedure Laterality Date    BREAST SURGERY  3/21/2024    reconstruction    COLONOSCOPY      CYSTOSCOPY N/A 2018    Procedure: CYSTOSCOPY;  Surgeon: Rohit Turk DO;  Location: [facility] OR;  Service: Gynecology    HYSTERECTOMY  2017    CO BREAST REDUCTION Bilateral 2024    Procedure: BREAST REDUCTION;  Surgeon: Josefina Boyd DO;  Location:  MAIN OR;  Service: Plastics    CO EXCISION EXCESSIVE SKIN & SUBQ TISSUE ABDOMEN N/A 2024    Procedure: ABDOMINOPLASTY;  Surgeon: Josefina Boyd DO;  Location:  MAIN OR;  Service: Plastics    CO LAPS SUPRACRV HYSTERECT 250 GM/< RMVL TUBE/OVAR Bilateral 2018    Procedure:  HYSTERECTOMY LAPAROSCOPIC SUPRACERVICAL (LSH) AND REMOVAL OF BOTH TUBES;  Surgeon: Rohit Turk DO;  Location: [facility];  Service: Gynecology    KY SUCTION ASSISTED LIPECTOMY TRUNK N/A 2024    Procedure: LIPOSUCTION TO BACK & FLANKS;  Surgeon: Josefina Boyd DO;  Location:  MAIN OR;  Service: Plastics    VAGINAL DELIVERY      WISDOM TOOTH EXTRACTION         Past OB/Gyn History:  OB History          1    Para   1    Term   1            AB        Living   1         SAB        IAB        Ectopic        Multiple        Live Births   1                 Family History   Problem Relation Age of Onset    Breast cancer Mother 68    No Known Problems Father     No Known Problems Daughter     Ovarian cancer Maternal Grandmother 84    Colon cancer Maternal Grandfather 84    Lung cancer Paternal Grandmother 88    No Known Problems Paternal Grandfather     No Known Problems Maternal Aunt     Breast cancer Maternal Aunt 40    No Known Problems Paternal Aunt        Social History:  Social History     Socioeconomic History    Marital status:      Spouse name: Not on file    Number of children: Not on file    Years of education: Not on file    Highest education level: Not on file   Occupational History    Not on file   Tobacco Use    Smoking status: Former     Types: Cigarettes     Start date:      Quit date:      Years since quittin.0    Smokeless tobacco: Never   Vaping Use    Vaping status: Never Used   Substance and Sexual Activity    Alcohol use: Yes     Alcohol/week: 6.0 standard drinks of alcohol     Comment: Socially    Drug use: No    Sexual activity: Yes     Partners: Male     Birth control/protection: Rhythm, Male Sterilization, Female Sterilization   Other Topics Concern    Not on file   Social History Narrative    ALWAYS USES SEAT BELT    DAILY CAFFEINE CONSUMPTION 1 SERVING A DAY    FEELS SAFE AT HOME     Social Drivers of Health     Financial Resource Strain:  Not on file   Food Insecurity: No Food Insecurity (3/15/2024)    Nursing - Inadequate Food Risk Classification     Worried About Running Out of Food in the Last Year: Never true     Ran Out of Food in the Last Year: Never true     Ran Out of Food in the Last Year: Not on file   Transportation Needs: No Transportation Needs (3/15/2024)    PRAPARE - Transportation     Lack of Transportation (Medical): No     Lack of Transportation (Non-Medical): No   Physical Activity: Not on file   Stress: Not on file   Social Connections: Not on file   Intimate Partner Violence: Not on file   Housing Stability: Low Risk  (3/15/2024)    Housing Stability Vital Sign     Unable to Pay for Housing in the Last Year: No     Number of Times Moved in the Last Year: 1     Homeless in the Last Year: No     No Known Allergies    Current Outpatient Medications:     Ascorbic Acid (vitamin C) 1000 MG tablet, Take 1,000 mg by mouth daily, Disp: , Rfl:     cholecalciferol (VITAMIN D3) 1,000 units tablet, Take 1,000 Units by mouth daily, Disp: , Rfl:     ibuprofen (MOTRIN) 800 mg tablet, Take 1 tablet (800 mg total) by mouth every 8 (eight) hours as needed for mild pain (DO NOT BEGIN UNTIL 48 HOURS AFTER SURGERY), Disp: 15 tablet, Rfl: 0    levothyroxine 50 mcg tablet, TAKE 1 TABLET(50 MCG) BY MOUTH DAILY, Disp: 90 tablet, Rfl: 1      Review of Systems  Constitutional :no fever, feels well, no tiredness, no recent weight gain or loss  ENT: no ear ache, no loss of hearing, no nosebleeds or nasal discharge, no sore throat or hoarseness.  Cardiovascular: no complaints of slow or fast heart beat, no chest pain, no palpitations, no leg claudication or lower extremity edema.  Respiratory: no complaints of shortness of shortness of breath, no GIBSON  Breasts:no complaints of breast pain, breast lump, or nipple discharge  Gastrointestinal: no complaints of abdominal pain, constipation, nausea, vomiting, or diarrhea or bloody stools  Genitourinary : no  "complaints of dysuria, incontinence, pelvic pain, no dysmenorrhea, vaginal discharge or abnormal vaginal bleeding and as noted in HPI.  Musculoskeletal: no complaints of arthralgia, no myalgia, no joint swelling or stiffness, no limb pain or swelling.  Integumentary: no complaints of skin rash or lesion, itching or dry skin  Neurological: no complaints of headache, no confusion, no numbness or tingling, no dizziness or fainting    Objective      /70   Ht 5' 3\" (1.6 m)   Wt 87.5 kg (193 lb)   LMP 11/14/2018 (Approximate) Comment: Pt states it may have been the first two weeks in november.   BMI 34.19 kg/m²     General:   appears stated age, cooperative, alert normal mood and affect   Lungs: Unlabored     Breasts: normal appearance, no masses or tenderness, scar from  breast reduction   Abdomen: soft, non-tender, without masses or organomegaly, abdominoplasty scar     Vulva: normal, normal female genitalia, Bartholin's, Urethra, [place] normal, no lesions, normal female hair distribution, no clitoral enlargement   Vagina: normal vagina, no discharge, exudate, lesion, or erythema   Urethra: normal   Cervix: Normal, no discharge. Nontender.   Uterus: uterus absent   Adnexa: no mass, fullness, tenderness   Psychiatric orientation to person, place, and time: normal. mood and affect: normal            "

## 2025-01-20 ENCOUNTER — RESULTS FOLLOW-UP (OUTPATIENT)
Dept: OBGYN CLINIC | Facility: CLINIC | Age: 55
End: 2025-01-20

## 2025-01-20 LAB
LAB AP GYN PRIMARY INTERPRETATION: NORMAL
Lab: NORMAL

## 2025-02-14 ENCOUNTER — OFFICE VISIT (OUTPATIENT)
Age: 55
End: 2025-02-14

## 2025-02-14 DIAGNOSIS — N62 MACROMASTIA: Primary | ICD-10-CM

## 2025-02-14 PROCEDURE — 99024 POSTOP FOLLOW-UP VISIT: CPT | Performed by: STUDENT IN AN ORGANIZED HEALTH CARE EDUCATION/TRAINING PROGRAM

## 2025-02-18 NOTE — PROGRESS NOTES
Patient seen and examined.  Overall doing well.    No palpable breast masses.  Well healed incisions.  There is more glandular fullness laterally on the right c/w left.  Abdomen with bilateral standing cones and central tethering of scars.  Excellent back contour.    We discussed the potential to see if insurance may be willing to cover addressing that lateral aspect of the right breast.  I would address the abdominal revisions in the office.    Booking form created.    Josefina Boyd,   Plastic and Reconstructive Surgery

## 2025-05-15 DIAGNOSIS — H10.30 ACUTE CONJUNCTIVITIS, UNSPECIFIED ACUTE CONJUNCTIVITIS TYPE, UNSPECIFIED LATERALITY: ICD-10-CM

## 2025-05-16 RX ORDER — TOBRAMYCIN 3 MG/ML
SOLUTION/ DROPS OPHTHALMIC
Qty: 5 ML | Refills: 0 | OUTPATIENT
Start: 2025-05-16

## 2025-07-10 DIAGNOSIS — E03.9 ACQUIRED HYPOTHYROIDISM: ICD-10-CM

## 2025-07-11 RX ORDER — LEVOTHYROXINE SODIUM 50 UG/1
50 TABLET ORAL DAILY
Qty: 90 TABLET | Refills: 1 | Status: SHIPPED | OUTPATIENT
Start: 2025-07-11

## 2025-07-21 ENCOUNTER — PROCEDURE VISIT (OUTPATIENT)
Age: 55
End: 2025-07-21

## 2025-07-21 DIAGNOSIS — Z41.1 ELECTIVE PROCEDURE FOR UNACCEPTABLE COSMETIC APPEARANCE: Primary | ICD-10-CM

## 2025-07-21 PROCEDURE — RECHECK: Performed by: STUDENT IN AN ORGANIZED HEALTH CARE EDUCATION/TRAINING PROGRAM

## (undated) DEVICE — ADHESIVE SKIN HIGH VISCOSITY EXOFIN 1ML

## (undated) DEVICE — NEPTUNE E-SEP SMOKE EVACUATION PENCIL, COATED, 70MM BLADE, PUSH BUTTON SWITCH: Brand: NEPTUNE E-SEP

## (undated) DEVICE — GLOVE PI ULTRA TOUCH SZ.8.5

## (undated) DEVICE — 3000CC GUARDIAN II: Brand: GUARDIAN

## (undated) DEVICE — GLOVE INDICATOR PI UNDERGLOVE SZ 8 BLUE

## (undated) DEVICE — TROCAR: Brand: KII FIOS FIRST ENTRY

## (undated) DEVICE — LAPAROSCOPIC SMOKE EVAC TUBING

## (undated) DEVICE — LIGASURE LAP SLR/DIV MARYLAND 5MM

## (undated) DEVICE — SPONGE COUNTER BAG BLUE

## (undated) DEVICE — GLOVE PI ULTRA TOUCH SZ 6

## (undated) DEVICE — SPONGE LAP 18 X 18 IN STRL RFD

## (undated) DEVICE — 3M™ TEGADERM™ TRANSPARENT FILM DRESSING FRAME STYLE, 1624W, 2-3/8 IN X 2-3/4 IN (6 CM X 7 CM), 100/CT 4CT/CASE: Brand: 3M™ TEGADERM™

## (undated) DEVICE — STERILE POLYISOPRENE POWDER-FREE SURGICAL GLOVES: Brand: PROTEXIS

## (undated) DEVICE — CHLORAPREP HI-LITE 26ML ORANGE

## (undated) DEVICE — SUT SILK 3-0 FS-1 684G

## (undated) DEVICE — GLOVE INDICATOR PI UNDERGLOVE SZ 8.5 BLUE

## (undated) DEVICE — TUBING INFILTRATION 9FT

## (undated) DEVICE — 1820 FOAM BLOCK NEEDLE COUNTER: Brand: DEVON

## (undated) DEVICE — INTENDED FOR TISSUE SEPARATION, AND OTHER PROCEDURES THAT REQUIRE A SHARP SURGICAL BLADE TO PUNCTURE OR CUT.: Brand: BARD-PARKER SAFETY BLADES SIZE 11, STERILE

## (undated) DEVICE — INTENDED FOR TISSUE SEPARATION, AND OTHER PROCEDURES THAT REQUIRE A SHARP SURGICAL BLADE TO PUNCTURE OR CUT.: Brand: BARD-PARKER ® SAFETYLOCK CARBON RIB-BACK BLADES

## (undated) DEVICE — PLASTIC ADHESIVE BANDAGE: Brand: CURITY

## (undated) DEVICE — GLOVE PI ULTRA TOUCH SZ.7.0

## (undated) DEVICE — PENCIL ELECTROSURG E-Z CLEAN -0035H

## (undated) DEVICE — GLOVE INDICATOR PI UNDERGLOVE SZ 7.5 BLUE

## (undated) DEVICE — 3M™ STERI-STRIP™ REINFORCED ADHESIVE SKIN CLOSURES, R1547, 1/2 IN X 4 IN (12 MM X 100 MM), 6 STRIPS/ENVELOPE: Brand: 3M™ STERI-STRIP™

## (undated) DEVICE — SUT MONOCRYL 5-0 P-3 18 IN Y493G

## (undated) DEVICE — TRAY FOLEY 16FR URIMETER SURESTEP

## (undated) DEVICE — CANNISTER WASTE IMPLOSION PROOF

## (undated) DEVICE — ELECTRODE EZ CLEAN BLADE -0012

## (undated) DEVICE — PACKING VAGINAL 2 IN

## (undated) DEVICE — DRAIN HUBLESS 15FR 3 1/16IN

## (undated) DEVICE — DRAPE EQUIPMENT RF WAND

## (undated) DEVICE — ASTOUND STANDARD SURGICAL GOWN, XL: Brand: CONVERTORS

## (undated) DEVICE — BLUE HEAT SCOPE WARMER

## (undated) DEVICE — GLOVE SRG BIOGEL 6

## (undated) DEVICE — ENDOPATH PNEUMONEEDLE INSUFFLATION NEEDLES WITH LUER LOCK CONNECTORS 120MM: Brand: ENDOPATH

## (undated) DEVICE — NEEDLE BLUNT 18 G X 1 1/2IN

## (undated) DEVICE — LIGHT GLOVE GREEN

## (undated) DEVICE — DRAPE SHEET THREE QUARTER

## (undated) DEVICE — SUT ETHILON 2-0 FS 18 IN 664H

## (undated) DEVICE — IRRIG ENDO FLO TUBING

## (undated) DEVICE — GAUZE SPONGES,8 PLY: Brand: CURITY

## (undated) DEVICE — ELECTRODE BLADE MOD E-Z CLEAN 2.5IN 6.4CM -0012M

## (undated) DEVICE — SCD SEQUENTIAL COMPRESSION COMFORT SLEEVE MEDIUM KNEE LENGTH: Brand: KENDALL SCD

## (undated) DEVICE — TROCAR: Brand: KII SLEEVE

## (undated) DEVICE — NEEDLE 21 G X 1 1/2 SAFETY

## (undated) DEVICE — GLOVE INDICATOR UNDERGLOVE SZ 6 BLUE

## (undated) DEVICE — STANDARD SURGICAL GOWN, L: Brand: CONVERTORS

## (undated) DEVICE — CANNULA 4MM MERCEDES 30CM 10MM PORT

## (undated) DEVICE — BULB SYRINGE,IRRIGATION WITH PROTECTIVE CAP: Brand: DOVER

## (undated) DEVICE — ABDOMINAL PAD: Brand: DERMACEA

## (undated) DEVICE — SPONGE GAUZE 2 X 2 4PLY STRL

## (undated) DEVICE — BETHLEHEM UNIVERSAL GYN LAP PK: Brand: CARDINAL HEALTH

## (undated) DEVICE — SUT PDS II 4-0 PS-2 18 IN Z496G

## (undated) DEVICE — BASIC SINGLE BASIN-LF: Brand: MEDLINE INDUSTRIES, INC.

## (undated) DEVICE — STERILE POLYISOPRENE POWDER-FREE SURGICAL GLOVES WITH EMOLLIENT COATING: Brand: PROTEXIS

## (undated) DEVICE — [HIGH FLOW INSUFFLATOR,  DO NOT USE IF PACKAGE IS DAMAGED,  KEEP DRY,  KEEP AWAY FROM SUNLIGHT,  PROTECT FROM HEAT AND RADIOACTIVE SOURCES.]: Brand: PNEUMOSURE

## (undated) DEVICE — PROXIMATE SKIN STAPLERS (35 WIDE) CONTAINS 35 STAINLESS STEEL STAPLES (FIXED HEAD): Brand: PROXIMATE

## (undated) DEVICE — SUT MONOCRYL 3-0 SH 27 IN Y416H

## (undated) DEVICE — STAPLER INSORB SUBCUTICULAR 30 SINGLE USE

## (undated) DEVICE — DRAPE TOWEL: Brand: CONVERTORS

## (undated) DEVICE — ADHESIVE SKIN HIGH VISCOSITY EXOFIN PRECISION PEN

## (undated) DEVICE — UTERINE MANIPULATOR RUMI 6.7 X 10 CM

## (undated) DEVICE — ENDOPATH 5MM CURVED SCISSORS WITH MONOPOLAR CAUTERY: Brand: ENDOPATH

## (undated) DEVICE — KIT INCLUDES:GTB14, ALEXIS CONTAINED EXT SYS 5BXCNGL3, GELPOINT MINI ADV ACCESS PLATFORM: Brand: ALEXIS CONTAINED EXTRACTION SYSTEM WITH GELPOINT MINI ADVANCED ACCESS PLATFORM

## (undated) DEVICE — Device: Brand: OLYMPUS

## (undated) DEVICE — UNDYED BRAIDED (POLYGLACTIN 910), SYNTHETIC ABSORBABLE SUTURE: Brand: COATED VICRYL

## (undated) DEVICE — SYRINGE 10ML LL

## (undated) DEVICE — GLOVE INDICATOR PI UNDERGLOVE SZ 6.5 BLUE

## (undated) DEVICE — TUBING ASPIRATION LIPOSUCTION SET 12FT

## (undated) DEVICE — TRAY FOLEY 16FR URIMETER SILICONE SURESTEP

## (undated) DEVICE — SINGLE PORT MANIFOLD: Brand: NEPTUNE 2

## (undated) DEVICE — SUT PDS II 0 CT-1 27 IN Z340H

## (undated) DEVICE — SUT PDS II 2-0 CT-1 27 IN Z339H

## (undated) DEVICE — PACK UNIVERSAL DRAPES SUB-Q ICD

## (undated) DEVICE — JACKSON-PRATT 100CC BULB RESERVOIR: Brand: CARDINAL HEALTH

## (undated) DEVICE — CRADLE EXTREMITY UNIVERSAL CONTOURED

## (undated) DEVICE — SUT SILK 0 SH 30 IN K834H

## (undated) DEVICE — SKIN MARKER DUAL TIP WITH RULER CAP, FLEXIBLE RULER AND LABELS: Brand: DEVON

## (undated) DEVICE — DISPOSABLE OR TOWEL: Brand: CARDINAL HEALTH

## (undated) DEVICE — TUBING SUCTION 5MM X 12 FT

## (undated) DEVICE — OCCLUSIVE GAUZE STRIP,3% BISMUTH TRIBROMOPHENATE IN PETROLATUM BLEND: Brand: XEROFORM

## (undated) DEVICE — DRAPE SURGIKIT SADDLE BAG LAP

## (undated) DEVICE — GLOVE INDICATOR PI UNDERGLOVE SZ 7 BLUE

## (undated) DEVICE — CHEST/BREAST DRAPE: Brand: CONVERTORS

## (undated) DEVICE — SUT PLAIN 3-0 PS-1 27 IN 1640H

## (undated) DEVICE — MAYO STAND COVER: Brand: CONVERTORS

## (undated) DEVICE — PREMIUM DRY TRAY LF: Brand: MEDLINE INDUSTRIES, INC.

## (undated) DEVICE — ELECTRODE LAP J HOOK E-Z CLEAN 33CM-0021

## (undated) DEVICE — SUT VICRYL 0 CT-1 36 IN J946H